# Patient Record
Sex: FEMALE | Race: BLACK OR AFRICAN AMERICAN | NOT HISPANIC OR LATINO | Employment: OTHER | URBAN - METROPOLITAN AREA
[De-identification: names, ages, dates, MRNs, and addresses within clinical notes are randomized per-mention and may not be internally consistent; named-entity substitution may affect disease eponyms.]

---

## 2017-01-11 ENCOUNTER — HOSPITAL ENCOUNTER (OUTPATIENT)
Dept: RADIOLOGY | Age: 82
Discharge: HOME | End: 2017-01-11
Payer: MEDICARE

## 2017-01-11 DIAGNOSIS — Z13.820 ENCOUNTER FOR SCREENING FOR OSTEOPOROSIS: ICD-10-CM

## 2017-01-11 PROCEDURE — 77080 DXA BONE DENSITY AXIAL: CPT

## 2017-01-27 ENCOUNTER — TRANSCRIBE ORDERS (OUTPATIENT)
Dept: ADMINISTRATIVE | Age: 82
End: 2017-01-27

## 2017-01-27 ENCOUNTER — LAB (OUTPATIENT)
Dept: LAB | Age: 82
End: 2017-01-27
Payer: MEDICARE

## 2017-01-27 DIAGNOSIS — R73.01 IMPAIRED FASTING GLUCOSE: ICD-10-CM

## 2017-01-27 DIAGNOSIS — E78.5 HYPERLIPIDEMIA: ICD-10-CM

## 2017-01-27 LAB
ALBUMIN SERPL BCP-MCNC: 3.9 G/DL (ref 3.5–5)
ALP SERPL-CCNC: 88 U/L (ref 46–116)
ALT SERPL W P-5'-P-CCNC: 26 U/L (ref 12–78)
ANION GAP SERPL CALCULATED.3IONS-SCNC: 6 MMOL/L (ref 4–13)
AST SERPL W P-5'-P-CCNC: 17 U/L (ref 5–45)
BASOPHILS # BLD AUTO: 0.02 THOUSANDS/ΜL (ref 0–0.1)
BASOPHILS NFR BLD AUTO: 0 % (ref 0–1)
BILIRUB SERPL-MCNC: 0.44 MG/DL (ref 0.2–1)
BUN SERPL-MCNC: 17 MG/DL (ref 5–25)
CALCIUM SERPL-MCNC: 9.1 MG/DL (ref 8.3–10.1)
CHLORIDE SERPL-SCNC: 105 MMOL/L (ref 100–108)
CHOLEST SERPL-MCNC: 225 MG/DL (ref 50–200)
CO2 SERPL-SCNC: 30 MMOL/L (ref 21–32)
CREAT SERPL-MCNC: 0.77 MG/DL (ref 0.6–1.3)
EOSINOPHIL # BLD AUTO: 0.13 THOUSAND/ΜL (ref 0–0.61)
EOSINOPHIL NFR BLD AUTO: 3 % (ref 0–6)
ERYTHROCYTE [DISTWIDTH] IN BLOOD BY AUTOMATED COUNT: 14.1 % (ref 11.6–15.1)
EST. AVERAGE GLUCOSE BLD GHB EST-MCNC: 128 MG/DL
GFR SERPL CREATININE-BSD FRML MDRD: >60 ML/MIN/1.73SQ M
GLUCOSE SERPL-MCNC: 98 MG/DL (ref 65–140)
HBA1C MFR BLD: 6.1 % (ref 4.2–6.3)
HCT VFR BLD AUTO: 40.1 % (ref 34.8–46.1)
HDLC SERPL-MCNC: 135 MG/DL (ref 40–60)
HGB BLD-MCNC: 12.9 G/DL (ref 11.5–15.4)
LDLC SERPL CALC-MCNC: 72 MG/DL (ref 0–100)
LYMPHOCYTES # BLD AUTO: 2.16 THOUSANDS/ΜL (ref 0.6–4.47)
LYMPHOCYTES NFR BLD AUTO: 43 % (ref 14–44)
MCH RBC QN AUTO: 31 PG (ref 26.8–34.3)
MCHC RBC AUTO-ENTMCNC: 32.2 G/DL (ref 31.4–37.4)
MCV RBC AUTO: 96 FL (ref 82–98)
MONOCYTES # BLD AUTO: 0.46 THOUSAND/ΜL (ref 0.17–1.22)
MONOCYTES NFR BLD AUTO: 9 % (ref 4–12)
NEUTROPHILS # BLD AUTO: 2.2 THOUSANDS/ΜL (ref 1.85–7.62)
NEUTS SEG NFR BLD AUTO: 45 % (ref 43–75)
NRBC BLD AUTO-RTO: 0 /100 WBCS
PLATELET # BLD AUTO: 228 THOUSANDS/UL (ref 149–390)
PMV BLD AUTO: 10.2 FL (ref 8.9–12.7)
POTASSIUM SERPL-SCNC: 4.1 MMOL/L (ref 3.5–5.3)
PROT SERPL-MCNC: 7.7 G/DL (ref 6.4–8.2)
RBC # BLD AUTO: 4.16 MILLION/UL (ref 3.81–5.12)
SODIUM SERPL-SCNC: 141 MMOL/L (ref 136–145)
TRIGL SERPL-MCNC: 92 MG/DL
WBC # BLD AUTO: 4.98 THOUSAND/UL (ref 4.31–10.16)

## 2017-01-27 PROCEDURE — 80061 LIPID PANEL: CPT

## 2017-01-27 PROCEDURE — 85025 COMPLETE CBC W/AUTO DIFF WBC: CPT

## 2017-01-27 PROCEDURE — 83036 HEMOGLOBIN GLYCOSYLATED A1C: CPT

## 2017-01-27 PROCEDURE — 36415 COLL VENOUS BLD VENIPUNCTURE: CPT

## 2017-01-27 PROCEDURE — 80053 COMPREHEN METABOLIC PANEL: CPT

## 2017-02-28 ENCOUNTER — ALLSCRIPTS OFFICE VISIT (OUTPATIENT)
Dept: OTHER | Facility: OTHER | Age: 82
End: 2017-02-28

## 2017-02-28 DIAGNOSIS — E78.5 HYPERLIPIDEMIA: ICD-10-CM

## 2017-02-28 DIAGNOSIS — R73.01 IMPAIRED FASTING GLUCOSE: ICD-10-CM

## 2017-02-28 DIAGNOSIS — I35.0 NONRHEUMATIC AORTIC VALVE STENOSIS: ICD-10-CM

## 2017-04-05 ENCOUNTER — GENERIC CONVERSION - ENCOUNTER (OUTPATIENT)
Dept: OTHER | Facility: OTHER | Age: 82
End: 2017-04-05

## 2017-04-05 ENCOUNTER — HOSPITAL ENCOUNTER (OUTPATIENT)
Dept: NON INVASIVE DIAGNOSTICS | Facility: CLINIC | Age: 82
Discharge: HOME | End: 2017-04-05
Payer: MEDICARE

## 2017-04-05 DIAGNOSIS — I35.0 NONRHEUMATIC AORTIC VALVE STENOSIS: ICD-10-CM

## 2017-04-05 PROCEDURE — 93306 TTE W/DOPPLER COMPLETE: CPT

## 2017-08-23 ENCOUNTER — TRANSCRIBE ORDERS (OUTPATIENT)
Dept: ADMINISTRATIVE | Age: 82
End: 2017-08-23

## 2017-08-23 ENCOUNTER — APPOINTMENT (OUTPATIENT)
Dept: LAB | Age: 82
End: 2017-08-23
Payer: MEDICARE

## 2017-08-23 DIAGNOSIS — E78.5 HYPERLIPIDEMIA: ICD-10-CM

## 2017-08-23 DIAGNOSIS — R73.01 IMPAIRED FASTING GLUCOSE: ICD-10-CM

## 2017-08-23 DIAGNOSIS — Z12.31 VISIT FOR SCREENING MAMMOGRAM: Primary | ICD-10-CM

## 2017-08-23 LAB
ALBUMIN SERPL BCP-MCNC: 3.8 G/DL (ref 3.5–5)
ALP SERPL-CCNC: 75 U/L (ref 46–116)
ALT SERPL W P-5'-P-CCNC: 21 U/L (ref 12–78)
ANION GAP SERPL CALCULATED.3IONS-SCNC: 4 MMOL/L (ref 4–13)
AST SERPL W P-5'-P-CCNC: 19 U/L (ref 5–45)
BASOPHILS # BLD AUTO: 0.02 THOUSANDS/ΜL (ref 0–0.1)
BASOPHILS NFR BLD AUTO: 0 % (ref 0–1)
BILIRUB SERPL-MCNC: 0.46 MG/DL (ref 0.2–1)
BUN SERPL-MCNC: 13 MG/DL (ref 5–25)
CALCIUM SERPL-MCNC: 9.2 MG/DL (ref 8.3–10.1)
CHLORIDE SERPL-SCNC: 104 MMOL/L (ref 100–108)
CHOLEST SERPL-MCNC: 204 MG/DL (ref 50–200)
CO2 SERPL-SCNC: 31 MMOL/L (ref 21–32)
CREAT SERPL-MCNC: 0.75 MG/DL (ref 0.6–1.3)
EOSINOPHIL # BLD AUTO: 0.14 THOUSAND/ΜL (ref 0–0.61)
EOSINOPHIL NFR BLD AUTO: 3 % (ref 0–6)
ERYTHROCYTE [DISTWIDTH] IN BLOOD BY AUTOMATED COUNT: 14.7 % (ref 11.6–15.1)
EST. AVERAGE GLUCOSE BLD GHB EST-MCNC: 126 MG/DL
GFR SERPL CREATININE-BSD FRML MDRD: 86 ML/MIN/1.73SQ M
GLUCOSE P FAST SERPL-MCNC: 98 MG/DL (ref 65–99)
HBA1C MFR BLD: 6 % (ref 4.2–6.3)
HCT VFR BLD AUTO: 39.4 % (ref 34.8–46.1)
HDLC SERPL-MCNC: 115 MG/DL (ref 40–60)
HGB BLD-MCNC: 12.9 G/DL (ref 11.5–15.4)
LDLC SERPL CALC-MCNC: 71 MG/DL (ref 0–100)
LYMPHOCYTES # BLD AUTO: 2.08 THOUSANDS/ΜL (ref 0.6–4.47)
LYMPHOCYTES NFR BLD AUTO: 41 % (ref 14–44)
MCH RBC QN AUTO: 30.8 PG (ref 26.8–34.3)
MCHC RBC AUTO-ENTMCNC: 32.7 G/DL (ref 31.4–37.4)
MCV RBC AUTO: 94 FL (ref 82–98)
MONOCYTES # BLD AUTO: 0.45 THOUSAND/ΜL (ref 0.17–1.22)
MONOCYTES NFR BLD AUTO: 9 % (ref 4–12)
NEUTROPHILS # BLD AUTO: 2.33 THOUSANDS/ΜL (ref 1.85–7.62)
NEUTS SEG NFR BLD AUTO: 47 % (ref 43–75)
NRBC BLD AUTO-RTO: 0 /100 WBCS
PLATELET # BLD AUTO: 264 THOUSANDS/UL (ref 149–390)
PMV BLD AUTO: 11.1 FL (ref 8.9–12.7)
POTASSIUM SERPL-SCNC: 4.6 MMOL/L (ref 3.5–5.3)
PROT SERPL-MCNC: 7.6 G/DL (ref 6.4–8.2)
RBC # BLD AUTO: 4.19 MILLION/UL (ref 3.81–5.12)
SODIUM SERPL-SCNC: 139 MMOL/L (ref 136–145)
TRIGL SERPL-MCNC: 88 MG/DL
WBC # BLD AUTO: 5.03 THOUSAND/UL (ref 4.31–10.16)

## 2017-08-23 PROCEDURE — 85025 COMPLETE CBC W/AUTO DIFF WBC: CPT

## 2017-08-23 PROCEDURE — 83036 HEMOGLOBIN GLYCOSYLATED A1C: CPT

## 2017-08-23 PROCEDURE — 36415 COLL VENOUS BLD VENIPUNCTURE: CPT

## 2017-08-23 PROCEDURE — 80061 LIPID PANEL: CPT

## 2017-08-23 PROCEDURE — 80053 COMPREHEN METABOLIC PANEL: CPT

## 2017-08-29 ENCOUNTER — ALLSCRIPTS OFFICE VISIT (OUTPATIENT)
Dept: OTHER | Facility: OTHER | Age: 82
End: 2017-08-29

## 2017-08-29 DIAGNOSIS — E78.5 HYPERLIPIDEMIA: ICD-10-CM

## 2017-08-29 DIAGNOSIS — R73.01 IMPAIRED FASTING GLUCOSE: ICD-10-CM

## 2017-09-20 ENCOUNTER — GENERIC CONVERSION - ENCOUNTER (OUTPATIENT)
Dept: OTHER | Facility: OTHER | Age: 82
End: 2017-09-20

## 2017-10-23 ENCOUNTER — GENERIC CONVERSION - ENCOUNTER (OUTPATIENT)
Dept: OTHER | Facility: OTHER | Age: 82
End: 2017-10-23

## 2018-01-09 NOTE — RESULT NOTES
Message   echo OK     Verified Results  ECHO COMPLETE WITH CONTRAST IF INDICATED [id number] 08:44AM Pia Mavis Order Number: [id number]    - Patient Instructions: To schedule this appointment, please contact Central Scheduling at [phone]  Test Name Result Flag Reference   ECHO COMPLETE WITH CONTRAST IF INDICATED (Report)     Patricia [address], [address] [facility]   [phone]     Transthoracic Echocardiogram   2D, M-mode, Doppler, and Color Doppler     Study date: 2017     Patient: True Mosley   MR number: [id number]   Account number: [de-identified]   : 1935   Age: 80 years   Gender: Female   Status: Outpatient   Location: [facility]   Height: 63 in   Weight: 176 7 lb   BP: 138/ 70 mmHg     Indications: Nonrheumatic aortic stenosis     Diagnoses: I35 0 - Nonrheumatic aortic (valve) stenosis     Sonographer: EDIS Price, RDCS   Referring Physician: Regla Sunshine MD   Group: [facility]   Interpreting Physician: Marlena Wasserman DO     SUMMARY     LEFT VENTRICLE:   Systolic function was normal  Ejection fraction was estimated to be 60 %  There were no regional wall motion abnormalities  Wall thickness was moderately increased  Concentric hypertrophy was present  Doppler parameters were consistent with abnormal left ventricular relaxation (grade 1 diastolic dysfunction)  RIGHT VENTRICLE:   The size was normal    Systolic function was normal      LEFT ATRIUM:   The atrium was mildly dilated  MITRAL VALVE:   There was moderate annular calcification  There was mild regurgitation  TRICUSPID VALVE:   There was mild regurgitation  Pulmonary artery systolic pressure was at the upper limits of normal      PULMONIC VALVE:   There was mild regurgitation  HISTORY: PRIOR HISTORY: Hyperlipidemia; GERD;  Aortic stenosis     PROCEDURE: The study was performed in the [facility]  This was a routine study  The transthoracic approach was used  The study included complete 2D imaging, M-mode, complete spectral Doppler, and color Doppler  The   heart rate was 63 bpm, at the start of the study  Images were obtained from the parasternal, apical, subcostal, and suprasternal notch acoustic windows  Image quality was adequate  LEFT VENTRICLE: Size was normal  Systolic function was normal  Ejection fraction was estimated to be 60 %  There were no regional wall motion abnormalities  Wall thickness was moderately increased  Concentric hypertrophy was present  DOPPLER: Doppler parameters were consistent with abnormal left ventricular relaxation (grade 1 diastolic dysfunction)  There was no evidence of elevated ventricular filling pressure by Doppler parameters  RIGHT VENTRICLE: The size was normal  Systolic function was normal  Wall thickness was normal      LEFT ATRIUM: The atrium was mildly dilated  RIGHT ATRIUM: Size was normal      MITRAL VALVE: There was moderate annular calcification  There was normal leaflet separation  DOPPLER: The transmitral velocity was within the normal range  There was no evidence for stenosis  There was mild regurgitation  AORTIC VALVE: The valve was trileaflet  Leaflets exhibited normal cuspal separation and sclerosis  DOPPLER: Transaortic velocity was within the normal range  There was no evidence for stenosis  There was no regurgitation  TRICUSPID VALVE: The valve structure was normal  There was normal leaflet separation  DOPPLER: The transtricuspid velocity was within the normal range  There was no evidence for stenosis  There was mild regurgitation  Pulmonary artery   systolic pressure was at the upper limits of normal      PULMONIC VALVE: Leaflets exhibited normal thickness, no calcification, and normal cuspal separation  DOPPLER: The transpulmonic velocity was within the normal range  There was mild regurgitation  PERICARDIUM: There was no pericardial effusion  The pericardium was normal in appearance  AORTA: The root exhibited normal size  SYSTEMIC VEINS: IVC: The inferior vena cava was normal in size and course   Respirophasic changes were normal      SYSTEM MEASUREMENT TABLES     2D   %FS: 46 75 %   AV Diam: 2 72 cm   EDV(Teich): 69 41 ml   EF(Cube): 84 9 %   EF(Teich): 78 68 %   ESV(Cube): 9 56 ml   ESV(Teich): 14 8 ml   IVSd: 1 36 cm   LA Area: 20 8 cm2   LA Diam: 4 06 cm   LVEDV MOD A4C: 61 4 ml   LVEF MOD A4C: 68 1 %   LVESV MOD A4C: 19 59 ml   LVIDd: 3 99 cm   LVIDs: 2 12 cm   LVLd A4C: 6 37 cm   LVLs A4C: 4 89 cm   LVPWd: 1 28 cm   RA Area: 8 63 cm2   RV Diam: 2 89 cm   SI(Cube): 29 22 ml/m2   SI(Teich): 29 68 ml/m2   SV MOD A4C: 41 81 ml   SV(Cube): 53 76 ml   SV(Teich): 54 61 ml     CW   TR MaxP 21 mmHg   TR Vmax: 2 88 m/s     MM   TAPSE: 2 23 cm     PW   E': 0 07 m/s   E/E': 13 61   MV A Jose: 1 11 m/s   MV [place]: 5 81 m/s2   MV DecT: 163 49 ms   MV E Jose: 0 95 m/s   MV E/A Ratio: 0 86     Inters\[facility] [place] [facility]\"" Commission Accredited Echocardiography Laboratory     Prepared and electronically signed by     Sukhdev Solis DO   Signed 2017 12:26:51

## 2018-01-10 NOTE — RESULT NOTES
Verified Results  (1) CBC/PLT/DIFF [id number] 08:54AM Larry Love     Test Name Result Flag Reference   WBC COUNT 5 13 Thousand/uL  4 31-10 16   RBC COUNT 4 02 Million/uL  3 81-5 12   HEMOGLOBIN 12 4 g/dL  11 5-15 4   HEMATOCRIT 38 5 %  34 8-46  1   MCV 96 fL  82-98   MCH 30 8 pg  26 8-34 3   MCHC 32 2 g/dL  31 4-37 4   RDW 14 1 %  11 6-15 1   MPV 11 0 fL  8 9-12 7   PLATELET COUNT 319 Thousands/uL  149-390   nRBC AUTOMATED 0 /100 WBCs     NEUTROPHILS RELATIVE PERCENT 39 % L 43-75   LYMPHOCYTES RELATIVE PERCENT 48 % H 14-44   MONOCYTES RELATIVE PERCENT 10 %  4-12   EOSINOPHILS RELATIVE PERCENT 3 %  0-6   BASOPHILS RELATIVE PERCENT 0 %  0-1   NEUTROPHILS ABSOLUTE COUNT 1 99 Thousands/µL  1 85-7 62   LYMPHOCYTES ABSOLUTE COUNT 2 45 Thousands/µL  0 60-4 47   MONOCYTES ABSOLUTE COUNT 0 53 Thousand/µL  0 17-1 22   EOSINOPHILS ABSOLUTE COUNT 0 13 Thousand/µL  0 00-0 61   BASOPHILS ABSOLUTE COUNT 0 02 Thousands/µL  0 00-0 10     (1) COMPREHENSIVE METABOLIC PANEL [id number] 20:95VH Larry Love   National Kidney Disease Education Program recommendations are as follows:  GFR calculation is accurate only with a steady state creatinine  Chronic Kidney disease less than 60 ml/min/1 73 sq  meters  Kidney failure less than 15 ml/min/1 73 sq  meters  Test Name Result Flag Reference   GLUCOSE,RANDM 91 mg/dL     If the patient is fasting, the ADA then defines impaired fasting glucose as > 100 mg/dL and diabetes as > or equal to 123 mg/dL     SODIUM 144 mmol/L  136-145   POTASSIUM 4 2 mmol/L  3 5-5 3   CHLORIDE 107 mmol/L  100-108   CARBON DIOXIDE 31 mmol/L  21-32   ANION GAP (CALC) 6 mmol/L  4-13   BLOOD UREA NITROGEN 14 mg/dL  5-25   CREATININE 0 79 mg/dL  0 60-1 30   Standardized to IDMS reference method   CALCIUM 8 5 mg/dL  8 3-10 1   BILI, TOTAL 0 45 mg/dL  0 20-1 00   ALK PHOSPHATAS 79 U/L     ALT (SGPT) 22 U/L  12-78   AST(SGOT) 18 U/L  5-45   ALBUMIN 3 7 g/dL  3 5-5 0   TOTAL PROTEIN 7 0 g/dL  6 4-8 2   eGFR Non-African American      >60 0 ml/min/1 73sq m     (1) LIPID PANEL, FASTING [id number] 08:54AM Marcio Guerra   Triglyceride:         Normal              <150 mg/dl       Borderline High    150-199 mg/dl       High               200-499 mg/dl       Very High          >499 mg/dl  Cholesterol:         Desirable        <200 mg/dl      Borderline High  200-239 mg/dl      High             >239 mg/dl  HDL Cholesterol:        High    >59 mg/dL      Low     <41 mg/dL  LDL CALCULATED:    This screening LDL is a calculated result  It does not have the accuracy of the Direct Measured LDL in the monitoring of patients with hyperlipidemia and/or statin therapy  Direct Measure LDL (TUN643) must be ordered separately in these patients       Test Name Result Flag Reference   CHOLESTEROL 172 mg/dL     HDL,DIRECT 96 mg/dL H 40-60   LDL CHOLESTEROL CALCULATED 60 mg/dL  0-100   TRIGLYCERIDES 80 mg/dL  <=150

## 2018-01-12 VITALS
HEIGHT: 63 IN | SYSTOLIC BLOOD PRESSURE: 124 MMHG | HEART RATE: 78 BPM | OXYGEN SATURATION: 96 % | BODY MASS INDEX: 29.97 KG/M2 | DIASTOLIC BLOOD PRESSURE: 70 MMHG | WEIGHT: 169.13 LBS

## 2018-01-12 NOTE — RESULT NOTES
Verified Results  (1) CBC/PLT/DIFF [id number] 09:59AM Nadira Matthew Order Number: [id number]     Test Name Result Flag Reference   WBC COUNT 5 15 Thousand/uL  4 31-10 16   RBC COUNT 4 00 Million/uL  3 81-5 12   HEMOGLOBIN 12 4 g/dL  11 5-15 4   HEMATOCRIT 38 2 %  34 8-46  1   MCV 96 fL  82-98   MCH 31 0 pg  26 8-34 3   MCHC 32 5 g/dL  31 4-37 4   RDW 14 0 %  11 6-15 1   MPV 10 7 fL  8 9-12 7   PLATELET COUNT 863 Thousands/uL  149-390   nRBC AUTOMATED 0 /100 WBCs     NEUTROPHILS RELATIVE PERCENT 50 %  43-75   LYMPHOCYTES RELATIVE PERCENT 38 %  14-44   MONOCYTES RELATIVE PERCENT 8 %  4-12   EOSINOPHILS RELATIVE PERCENT 3 %  0-6   BASOPHILS RELATIVE PERCENT 1 %  0-1   NEUTROPHILS ABSOLUTE COUNT 2 62 Thousands/?L  1 85-7 62   LYMPHOCYTES ABSOLUTE COUNT 1 93 Thousands/?L  0 60-4 47   MONOCYTES ABSOLUTE COUNT 0 42 Thousand/?L  0 17-1 22   EOSINOPHILS ABSOLUTE COUNT 0 14 Thousand/?L  0 00-0 61   BASOPHILS ABSOLUTE COUNT 0 03 Thousands/?L  0 00-0 10     (1) COMPREHENSIVE METABOLIC PANEL [id number] 24:59UY Gee [facility] Order Number: [id number]     Test Name Result Flag Reference   GLUCOSE,RANDM 98 mg/dL     If the patient is fasting, the ADA then defines impaired fasting glucose as > 100 mg/dL and diabetes as > or equal to 123 mg/dL     SODIUM 138 mmol/L  136-145   POTASSIUM 4 7 mmol/L  3 5-5 3   CHLORIDE 103 mmol/L  100-108   CARBON DIOXIDE 30 mmol/L  21-32   ANION GAP (CALC) 5 mmol/L  4-13   BLOOD UREA NITROGEN 16 mg/dL  5-25   CREATININE 0 77 mg/dL  0 60-1 30   Standardized to IDMS reference method   CALCIUM 9 2 mg/dL  8 3-10 1   BILI, TOTAL 0 47 mg/dL  0 20-1 00   ALK PHOSPHATAS 83 U/L     ALT (SGPT) 23 U/L  12-78   AST(SGOT) 14 U/L  5-45   ALBUMIN 3 8 g/dL  3 5-5 0   TOTAL PROTEIN 7 4 g/dL  6 4-8 2   eGFR Non-African American      >60 0 ml/min/1 73sq m   [employer] Disease Education Program recommendations are as follows:  GFR calculation is accurate only with a steady state creatinine  Chronic Kidney disease less than 60 ml/min/1 73 sq  meters  Kidney failure less than 15 ml/min/1 73 sq  meters  (1) LIPID PANEL, FASTING 02Sep2016 09:59AM Megan Lion Order Number: [id number]     Test Name Result Flag Reference   CHOLESTEROL 206 mg/dL H    HDL,DIRECT 116 mg/dL H 40-60   Specimen collection should occur prior to Metamizole administration due to the potential for falsely depressed results  LDL CHOLESTEROL CALCULATED 72 mg/dL  0-100   Triglyceride:         Normal              <150 mg/dl       Borderline High    150-199 mg/dl       High               200-499 mg/dl       Very High          >499 mg/dl  Cholesterol:         Desirable        <200 mg/dl      Borderline High  200-239 mg/dl      High             >239 mg/dl  HDL Cholesterol:        High    >59 mg/dL      Low     <41 mg/dL  LDL CALCULATED:    This screening LDL is a calculated result  It does not have the accuracy of the Direct Measured LDL in the monitoring of patients with hyperlipidemia and/or statin therapy  Direct Measure LDL (YEK460) must be ordered separately in these patients  TRIGLYCERIDES 92 mg/dL  <=150   Specimen collection should occur prior to N-Acetylcysteine or Metamizole administration due to the potential for falsely depressed results  (1) HEMOGLOBIN A1C 02Sep2016 09:59AM Megan Lion Order Number: [id number]     Test Name Result Flag Reference   HEMOGLOBIN A1C 6 5 % H 4 2-6 3   EST  AVG   GLUCOSE 140 mg/dl

## 2018-01-13 NOTE — PROGRESS NOTES
Assessment    1  Encounter for preventive health examination (V70 0) (Z00 00)    Plan  Health Maintenance    · There are many exercise options for seniors ; Status:Complete;   Done: [id number]  Visit for screening mammogram    · * MAMMO SCREENING BILATERAL W CAD; Status:Hold For - Scheduling; Requested  for:62Htf8483;     Discussion/Summary  Impression: Subsequent Annual Wellness Visit  Cardiovascular screening and counseling: screening is current  Diabetes screening and counseling: screening is current  Colorectal cancer screening and counseling: screening is current  Breast cancer screening and counseling: screening is current  Glaucoma screening and counseling: screening is current  Immunizations: influenza vaccination is recommended annually and the lifetime pneumococcal vaccine has been completed  Advance Directive Planning: complete and up to date, paperwork and instructions were given to the patient, She does have a living will but was interested in updating it  I gave her the 5 wishes form today  Patient Discussion: plan discussed with the patient, follow-up visit needed in one year  Self Referrals: No      Chief Complaint  Medicare wellness      Advance Directives  Advance Directive [facility]:   YES - Patient has an advance health care directive  The patient has a living will located  in patient's home  History of Present Illness  Welcome to Medicare and Wellness Visits: The patient is being seen for the subsequent annual wellness visit and Her questionnaire was reviewed with her today and a copy is in her chart  Co-Managers and Medical Equipment/Suppliers: See Patient Care Team   Reviewed Updated [facility]:   Last Medicare Wellness Visit Information was reviewed, patient interviewed, no change since last AWV  Review of Systems    Cardiovascular: no chest pain  Respiratory: no shortness of breath  Gastrointestinal: no abdominal pain  Active Problems     1   Aortic stenosis, mild (424 1) (I35 0)   2  Encounter for vitamin deficiency screening (V77 99) (Z13 21)   3  Glaucoma (365 9) (H40 9)   4  Psoriasis (696 1) (L40 9)   5  Screening for colon cancer (V76 51) (Z12 11)    Hyperlipidemia (272 4) (E78 5)       GERD without esophagitis (530 81) (K21 9)       Impaired fasting glucose (790 21) (R73 01)          Past Medical History    · Denied: History of mental disorder   · Wellness examination (V70 0) (Z00 00)    The active problems and past medical history were reviewed and updated today  Family History  Mother    · Family history of    · Denied: Family history of mental disorder   · Denied: Family history of substance abuse  Father    · Family history of    · Family history of hypertension (V17 49) (Z82 49)   · Denied: Family history of mental disorder   · Denied: Family history of substance abuse  Family History    · Denied: Family history of mental disorder   · Denied: Family history of substance abuse    Social History    · Denied: History of High risk sexual behavior   · Denied: History of Illicit drug use   · Never a smoker   · Retired   · Social alcohol use (Z78 9)  The social history was reviewed and updated today  Current Meds   1  Crestor 10 MG Oral Tablet; TAKE 1 TABLET DAILY; Therapy: 39Wmp9731 to (Evaluate:50Sld6118)  Requested for: 10Otb2211; Last   Rx:44Imw4062 Ordered   2  Omeprazole 20 MG Oral Capsule Delayed Release; take 1 capsule daily; Therapy: 49Tod0720 to (Evaluate:08Xiq9860)  Requested for: 24Ngd4115; Last   Rx:30Eta2423 Ordered   3  Travatan SOLN;   Therapy: (Recorded:14Cpc1469) to Recorded    The medication list was reviewed and updated today  Allergies    1  No Known Drug Allergies    Immunizations   1    PCV  -Aug-2015     Physical Exam    Constitutional   General appearance: No acute distress, well appearing and well nourished      Psychiatric   Judgment and insight: Normal     Orientation to person, place, and time: Normal     Recent and remote memory: Intact      Mood and affect: Normal        Signatures   Electronically signed by : Jerlene Bernheim, MD; Sep 15 2016  4:00PM EST                       (Author)

## 2018-01-14 VITALS
BODY MASS INDEX: 31.45 KG/M2 | OXYGEN SATURATION: 97 % | SYSTOLIC BLOOD PRESSURE: 138 MMHG | HEART RATE: 74 BPM | WEIGHT: 177.5 LBS | DIASTOLIC BLOOD PRESSURE: 70 MMHG | HEIGHT: 63 IN

## 2018-03-19 ENCOUNTER — TELEPHONE (OUTPATIENT)
Dept: INTERNAL MEDICINE CLINIC | Facility: CLINIC | Age: 83
End: 2018-03-19

## 2018-03-19 NOTE — TELEPHONE ENCOUNTER
Patient called to make a follow up appt  She gets her prolia shot the same time  She made the appt for April 11th will she be able to get the shot then?  With authorization

## 2018-04-04 ENCOUNTER — LAB (OUTPATIENT)
Dept: LAB | Age: 83
End: 2018-04-04
Payer: MEDICARE

## 2018-04-04 DIAGNOSIS — R73.01 IMPAIRED FASTING GLUCOSE: ICD-10-CM

## 2018-04-04 DIAGNOSIS — E78.5 HYPERLIPIDEMIA: ICD-10-CM

## 2018-04-04 LAB
ALBUMIN SERPL BCP-MCNC: 3.9 G/DL (ref 3.5–5)
ALP SERPL-CCNC: 66 U/L (ref 46–116)
ALT SERPL W P-5'-P-CCNC: 21 U/L (ref 12–78)
ANION GAP SERPL CALCULATED.3IONS-SCNC: 3 MMOL/L (ref 4–13)
AST SERPL W P-5'-P-CCNC: 24 U/L (ref 5–45)
BASOPHILS # BLD AUTO: 0.01 THOUSANDS/ΜL (ref 0–0.1)
BASOPHILS NFR BLD AUTO: 0 % (ref 0–1)
BILIRUB SERPL-MCNC: 0.42 MG/DL (ref 0.2–1)
BUN SERPL-MCNC: 21 MG/DL (ref 5–25)
CALCIUM SERPL-MCNC: 8.5 MG/DL (ref 8.3–10.1)
CHLORIDE SERPL-SCNC: 106 MMOL/L (ref 100–108)
CHOLEST SERPL-MCNC: 222 MG/DL (ref 50–200)
CO2 SERPL-SCNC: 30 MMOL/L (ref 21–32)
CREAT SERPL-MCNC: 0.86 MG/DL (ref 0.6–1.3)
EOSINOPHIL # BLD AUTO: 0.14 THOUSAND/ΜL (ref 0–0.61)
EOSINOPHIL NFR BLD AUTO: 3 % (ref 0–6)
ERYTHROCYTE [DISTWIDTH] IN BLOOD BY AUTOMATED COUNT: 13.9 % (ref 11.6–15.1)
EST. AVERAGE GLUCOSE BLD GHB EST-MCNC: 134 MG/DL
GFR SERPL CREATININE-BSD FRML MDRD: 73 ML/MIN/1.73SQ M
GLUCOSE P FAST SERPL-MCNC: 94 MG/DL (ref 65–99)
HBA1C MFR BLD: 6.3 % (ref 4.2–6.3)
HCT VFR BLD AUTO: 39.5 % (ref 34.8–46.1)
HDLC SERPL-MCNC: 114 MG/DL (ref 40–60)
HGB BLD-MCNC: 12.9 G/DL (ref 11.5–15.4)
LDLC SERPL CALC-MCNC: 89 MG/DL (ref 0–100)
LYMPHOCYTES # BLD AUTO: 2.05 THOUSANDS/ΜL (ref 0.6–4.47)
LYMPHOCYTES NFR BLD AUTO: 46 % (ref 14–44)
MCH RBC QN AUTO: 31.3 PG (ref 26.8–34.3)
MCHC RBC AUTO-ENTMCNC: 32.7 G/DL (ref 31.4–37.4)
MCV RBC AUTO: 96 FL (ref 82–98)
MONOCYTES # BLD AUTO: 0.36 THOUSAND/ΜL (ref 0.17–1.22)
MONOCYTES NFR BLD AUTO: 8 % (ref 4–12)
NEUTROPHILS # BLD AUTO: 1.97 THOUSANDS/ΜL (ref 1.85–7.62)
NEUTS SEG NFR BLD AUTO: 43 % (ref 43–75)
NRBC BLD AUTO-RTO: 0 /100 WBCS
PLATELET # BLD AUTO: 246 THOUSANDS/UL (ref 149–390)
PMV BLD AUTO: 10.8 FL (ref 8.9–12.7)
POTASSIUM SERPL-SCNC: 4 MMOL/L (ref 3.5–5.3)
PROT SERPL-MCNC: 7.7 G/DL (ref 6.4–8.2)
RBC # BLD AUTO: 4.12 MILLION/UL (ref 3.81–5.12)
SODIUM SERPL-SCNC: 139 MMOL/L (ref 136–145)
TRIGL SERPL-MCNC: 94 MG/DL
WBC # BLD AUTO: 4.54 THOUSAND/UL (ref 4.31–10.16)

## 2018-04-04 PROCEDURE — 80053 COMPREHEN METABOLIC PANEL: CPT

## 2018-04-04 PROCEDURE — 83036 HEMOGLOBIN GLYCOSYLATED A1C: CPT

## 2018-04-04 PROCEDURE — 80061 LIPID PANEL: CPT

## 2018-04-04 PROCEDURE — 36415 COLL VENOUS BLD VENIPUNCTURE: CPT

## 2018-04-04 PROCEDURE — 85025 COMPLETE CBC W/AUTO DIFF WBC: CPT

## 2018-04-11 ENCOUNTER — OFFICE VISIT (OUTPATIENT)
Dept: INTERNAL MEDICINE CLINIC | Facility: CLINIC | Age: 83
End: 2018-04-11
Payer: MEDICARE

## 2018-04-11 VITALS
HEIGHT: 63 IN | OXYGEN SATURATION: 97 % | HEART RATE: 70 BPM | TEMPERATURE: 98.6 F | SYSTOLIC BLOOD PRESSURE: 142 MMHG | BODY MASS INDEX: 31.43 KG/M2 | RESPIRATION RATE: 16 BRPM | WEIGHT: 177.4 LBS | DIASTOLIC BLOOD PRESSURE: 86 MMHG

## 2018-04-11 DIAGNOSIS — K21.9 GERD WITHOUT ESOPHAGITIS: ICD-10-CM

## 2018-04-11 DIAGNOSIS — H40.10X0 OPEN-ANGLE GLAUCOMA, UNSPECIFIED GLAUCOMA STAGE, UNSPECIFIED LATERALITY, UNSPECIFIED OPEN-ANGLE GLAUCOMA TYPE: ICD-10-CM

## 2018-04-11 DIAGNOSIS — M81.0 AGE-RELATED OSTEOPOROSIS WITHOUT CURRENT PATHOLOGICAL FRACTURE: ICD-10-CM

## 2018-04-11 DIAGNOSIS — E78.2 MIXED HYPERLIPIDEMIA: Primary | ICD-10-CM

## 2018-04-11 DIAGNOSIS — R73.01 IMPAIRED FASTING GLUCOSE: ICD-10-CM

## 2018-04-11 PROBLEM — M15.9 DJD (DEGENERATIVE JOINT DISEASE), MULTIPLE SITES: Status: ACTIVE | Noted: 2017-08-29

## 2018-04-11 PROCEDURE — 99214 OFFICE O/P EST MOD 30 MIN: CPT | Performed by: INTERNAL MEDICINE

## 2018-04-11 RX ORDER — ROSUVASTATIN CALCIUM 10 MG/1
1 TABLET, COATED ORAL DAILY
COMMUNITY
Start: 2015-09-11 | End: 2018-10-16 | Stop reason: SDUPTHER

## 2018-04-11 RX ORDER — OMEPRAZOLE 20 MG/1
20 CAPSULE, DELAYED RELEASE ORAL DAILY
Qty: 90 CAPSULE | Refills: 1 | Status: SHIPPED | OUTPATIENT
Start: 2018-04-11 | End: 2018-10-16 | Stop reason: SDUPTHER

## 2018-04-11 RX ORDER — TRAVOPROST 0.004 %
DROPS OPHTHALMIC (EYE)
COMMUNITY
Start: 2018-03-30

## 2018-04-11 RX ORDER — CALCIPOTRIENE 50 UG/G
AEROSOL, FOAM TOPICAL
COMMUNITY
Start: 2018-02-15 | End: 2021-01-21 | Stop reason: ALTCHOICE

## 2018-04-11 RX ORDER — OMEPRAZOLE 20 MG/1
CAPSULE, DELAYED RELEASE ORAL
COMMUNITY
Start: 2018-02-25 | End: 2018-04-11 | Stop reason: SDUPTHER

## 2018-04-11 RX ORDER — CELECOXIB 100 MG/1
1 CAPSULE ORAL 2 TIMES DAILY
COMMUNITY
Start: 2017-08-29 | End: 2019-11-05 | Stop reason: SDUPTHER

## 2018-04-11 NOTE — PROGRESS NOTES
Assessment/Plan:     Chronic problems are stable  Continue present medications  Continue diet and exercise  Ordered labs for next visit  Followup scheduled per orders  No problem-specific Assessment & Plan notes found for this encounter  Diagnoses and all orders for this visit:    Mixed hyperlipidemia  -     CBC and differential; Future  -     Comprehensive metabolic panel; Future  -     Lipid panel; Future    GERD without esophagitis  -     omeprazole (PriLOSEC) 20 mg delayed release capsule; Take 1 capsule (20 mg total) by mouth daily    Impaired fasting glucose  -     HEMOGLOBIN A1C W/ EAG ESTIMATION; Future    Age-related osteoporosis without current pathological fracture  -     denosumab (PROLIA) subcutaneous injection 60 mg; Inject 1 mL (60 mg total) under the skin once     Open-angle glaucoma, unspecified glaucoma stage, unspecified laterality, unspecified open-angle glaucoma type    Other orders  -     SORILUX 0 005 % topical foam;   -     celecoxib (CeleBREX) 100 mg capsule; Take 1 capsule by mouth 2 (two) times a day  -     VOLTAREN 1 %;   -     Discontinue: omeprazole (PriLOSEC) 20 mg delayed release capsule;   -     rosuvastatin (CRESTOR) 10 MG tablet; Take 1 tablet by mouth daily  -     TRAVATAN Z 0 004 % ophthalmic solution;   -     ranibizumab (LUCENTIS) 0 5 mg/0 05mL; 0 5 mg once          Subjective:      Patient ID: Luz Pang is a 80 y o  female  Patient comes in today for routine follow-up  She states she is doing well and has no new complaints  Her blood work shows continued control of her sugar and her cholesterol  She is still only taking a half tablet of the medication without any noted side effects  The higher dose had been giving her trouble  Her reflux has not been bothering her as long as she takes her medicine  She reports that her main issue would be her thighs  The eye doctors are changing her drops          ALLERGIES:  No Known Allergies    CURRENT MEDICATIONS:    Current Outpatient Prescriptions:     celecoxib (CeleBREX) 100 mg capsule, Take 1 capsule by mouth 2 (two) times a day, Disp: , Rfl:     omeprazole (PriLOSEC) 20 mg delayed release capsule, Take 1 capsule (20 mg total) by mouth daily, Disp: 90 capsule, Rfl: 1    ranibizumab (LUCENTIS) 0 5 mg/0 05mL, 0 5 mg once, Disp: , Rfl:     rosuvastatin (CRESTOR) 10 MG tablet, Take 1 tablet by mouth daily, Disp: , Rfl:     SORILUX 0 005 % topical foam, , Disp: , Rfl:     TRAVATAN Z 0 004 % ophthalmic solution, , Disp: , Rfl:     VOLTAREN 1 %, , Disp: , Rfl:     Current Facility-Administered Medications:     denosumab (PROLIA) subcutaneous injection 60 mg, 60 mg, Subcutaneous, Once, Dave Quiñones MD    ACTIVE PROBLEM LIST:  Patient Active Problem List   Diagnosis    Aortic stenosis, mild    DJD (degenerative joint disease), multiple sites    GERD without esophagitis    Glaucoma    Hyperlipidemia    Impaired fasting glucose    Osteoporosis    Psoriasis       PAST MEDICAL HISTORY:  No past medical history on file  PAST SURGICAL HISTORY:  No past surgical history on file  FAMILY HISTORY:  Family History   Problem Relation Age of Onset    Hypertension Father        SOCIAL HISTORY:  Social History     Social History    Marital status:      Spouse name: N/A    Number of children: N/A    Years of education: N/A     Occupational History    Not on file  Social History Main Topics    Smoking status: Former Smoker     Packs/day: 0 50     Types: Cigarettes    Smokeless tobacco: Former User      Comment: Patient reports she smoked for 20 years    Alcohol use Yes    Drug use: No    Sexual activity: No     Other Topics Concern    Not on file     Social History Narrative    No narrative on file       Review of Systems   Respiratory: Negative for shortness of breath  Cardiovascular: Negative for chest pain  Gastrointestinal: Negative for abdominal pain           Objective:  Vitals: 04/11/18 1141   BP: 142/86   BP Location: Right arm   Patient Position: Sitting   Cuff Size: Adult   Pulse: 70   Resp: 16   Temp: 98 6 °F (37 °C)   SpO2: 97%   Weight: 80 5 kg (177 lb 6 4 oz)   Height: 5' 3" (1 6 m)        Physical Exam   Constitutional: She is oriented to person, place, and time  She appears well-developed and well-nourished  Cardiovascular: Normal rate, regular rhythm and normal heart sounds  Pulmonary/Chest: Effort normal and breath sounds normal    Abdominal: Soft  There is no tenderness  Neurological: She is alert and oriented to person, place, and time  Nursing note and vitals reviewed          RESULTS:    Recent Results (from the past 1008 hour(s))   CBC and differential    Collection Time: 04/04/18  9:56 AM   Result Value Ref Range    WBC 4 54 4 31 - 10 16 Thousand/uL    RBC 4 12 3 81 - 5 12 Million/uL    Hemoglobin 12 9 11 5 - 15 4 g/dL    Hematocrit 39 5 34 8 - 46 1 %    MCV 96 82 - 98 fL    MCH 31 3 26 8 - 34 3 pg    MCHC 32 7 31 4 - 37 4 g/dL    RDW 13 9 11 6 - 15 1 %    MPV 10 8 8 9 - 12 7 fL    Platelets 175 562 - 025 Thousands/uL    nRBC 0 /100 WBCs    Neutrophils Relative 43 43 - 75 %    Lymphocytes Relative 46 (H) 14 - 44 %    Monocytes Relative 8 4 - 12 %    Eosinophils Relative 3 0 - 6 %    Basophils Relative 0 0 - 1 %    Neutrophils Absolute 1 97 1 85 - 7 62 Thousands/µL    Lymphocytes Absolute 2 05 0 60 - 4 47 Thousands/µL    Monocytes Absolute 0 36 0 17 - 1 22 Thousand/µL    Eosinophils Absolute 0 14 0 00 - 0 61 Thousand/µL    Basophils Absolute 0 01 0 00 - 0 10 Thousands/µL   Comprehensive metabolic panel    Collection Time: 04/04/18  9:56 AM   Result Value Ref Range    Sodium 139 136 - 145 mmol/L    Potassium 4 0 3 5 - 5 3 mmol/L    Chloride 106 100 - 108 mmol/L    CO2 30 21 - 32 mmol/L    Anion Gap 3 (L) 4 - 13 mmol/L    BUN 21 5 - 25 mg/dL    Creatinine 0 86 0 60 - 1 30 mg/dL    Glucose, Fasting 94 65 - 99 mg/dL    Calcium 8 5 8 3 - 10 1 mg/dL    AST 24 5 - 45 U/L    ALT 21 12 - 78 U/L    Alkaline Phosphatase 66 46 - 116 U/L    Total Protein 7 7 6 4 - 8 2 g/dL    Albumin 3 9 3 5 - 5 0 g/dL    Total Bilirubin 0 42 0 20 - 1 00 mg/dL    eGFR 73 ml/min/1 73sq m   Lipid panel    Collection Time: 04/04/18  9:56 AM   Result Value Ref Range    Cholesterol 222 (H) 50 - 200 mg/dL    Triglycerides 94 <=150 mg/dL    HDL, Direct 114 (H) 40 - 60 mg/dL    LDL Calculated 89 0 - 100 mg/dL   Hemoglobin A1c    Collection Time: 04/04/18  9:56 AM   Result Value Ref Range    Hemoglobin A1C 6 3 4 2 - 6 3 %     mg/dl

## 2018-07-16 ENCOUNTER — OFFICE VISIT (OUTPATIENT)
Dept: INTERNAL MEDICINE CLINIC | Facility: CLINIC | Age: 83
End: 2018-07-16
Payer: MEDICARE

## 2018-07-16 VITALS
HEIGHT: 63 IN | DIASTOLIC BLOOD PRESSURE: 80 MMHG | HEART RATE: 62 BPM | RESPIRATION RATE: 20 BRPM | BODY MASS INDEX: 30.83 KG/M2 | TEMPERATURE: 98.4 F | OXYGEN SATURATION: 97 % | WEIGHT: 174 LBS | SYSTOLIC BLOOD PRESSURE: 130 MMHG

## 2018-07-16 DIAGNOSIS — R07.2 PRECORDIAL PAIN: Primary | ICD-10-CM

## 2018-07-16 PROCEDURE — 99213 OFFICE O/P EST LOW 20 MIN: CPT | Performed by: PHYSICIAN ASSISTANT

## 2018-07-16 RX ORDER — HALCINONIDE CREAM 1 MG/G
CREAM TOPICAL
COMMUNITY
Start: 2018-06-14

## 2018-07-16 NOTE — PROGRESS NOTES
Assessment/Plan:   Patient Instructions   Will schedule patient for a nuclear stress test   Will discuss results when they are available  EKG interpretation viewed from the machine (unable to print), no acute changes noted  Poor R-wave progression in the anterior leads  Return in about 3 months (around 10/16/2018) for Next scheduled follow up-Dr Rohit Carr  Diagnoses and all orders for this visit:    Precordial pain  -     NM myocardial perfusion spect (rx stress and/or rest); Future  -     POCT ECG    Other orders  -     HALOG 0 1 % CREA; Subjective:      Patient ID: Altagracia Haile is a 80 y o  female  Acute visit    Patient reports she had been on vacation and had been lugging her luggage around and doing a lot a walking  She noted on 2 occasions while walking excessive amount of time she developed a burning in her anterior chest   She states it lasted for a few minutes and then spontaneously subsided  She remark she felt a little short of breath with each episode  This occurred on 2 occasions but was not associated with diaphoresis, nausea, lightheadedness or palpitations  She states she took Tums because it was the only thing she had with her but was not convinced that it was helpful  She has a history of a hiatal hernia for which she is on omeprazole and this has not bothered her in many years  She denies any black stools  No fever or chills, no cough  Patient did remark that prior to going on vacation she had been walking on her treadmill for 30 min at a time without any difficulty  Echocardiogram done approximately 1 year ago showed mild mitral, tricuspid, and pulmonic regurgitation  EF was interpreted as 60%          ALLERGIES:  No Known Allergies    CURRENT MEDICATIONS:    Current Outpatient Prescriptions:     celecoxib (CeleBREX) 100 mg capsule, Take 1 capsule by mouth 2 (two) times a day, Disp: , Rfl:     HALOG 0 1 % CREA, , Disp: , Rfl:     omeprazole (PriLOSEC) 20 mg delayed release capsule, Take 1 capsule (20 mg total) by mouth daily, Disp: 90 capsule, Rfl: 1    rosuvastatin (CRESTOR) 10 MG tablet, Take 1 tablet by mouth daily, Disp: , Rfl:     SORILUX 0 005 % topical foam, , Disp: , Rfl:     TRAVATAN Z 0 004 % ophthalmic solution, , Disp: , Rfl:     ranibizumab (LUCENTIS) 0 5 mg/0 05mL, 0 5 mg once, Disp: , Rfl:     VOLTAREN 1 %, , Disp: , Rfl:     ACTIVE PROBLEM LIST:  Patient Active Problem List   Diagnosis    Aortic stenosis, mild    DJD (degenerative joint disease), multiple sites    GERD without esophagitis    Glaucoma    Hyperlipidemia    Impaired fasting glucose    Osteoporosis    Psoriasis    Precordial pain       PAST MEDICAL HISTORY:  No past medical history on file  PAST SURGICAL HISTORY:  No past surgical history on file  FAMILY HISTORY:  Family History   Problem Relation Age of Onset    Hypertension Father        SOCIAL HISTORY:  Social History     Social History    Marital status:      Spouse name: N/A    Number of children: N/A    Years of education: N/A     Occupational History    Not on file  Social History Main Topics    Smoking status: Former Smoker     Packs/day: 0 50     Types: Cigarettes    Smokeless tobacco: Former User      Comment: Patient reports she smoked for 20 years    Alcohol use Yes    Drug use: No    Sexual activity: No     Other Topics Concern    Not on file     Social History Narrative    No narrative on file       Review of Systems   Constitutional: Negative for activity change, chills, fatigue and fever  HENT: Negative for congestion  Eyes: Negative for discharge  Respiratory: Positive for shortness of breath  Negative for cough and chest tightness  Cardiovascular: Positive for chest pain  Negative for palpitations and leg swelling  Gastrointestinal: Negative for abdominal pain  Genitourinary: Negative for difficulty urinating     Musculoskeletal: Negative for arthralgias and myalgias  Skin: Negative for rash  Allergic/Immunologic: Negative for immunocompromised state  Neurological: Negative for dizziness, syncope, weakness, light-headedness and headaches  Hematological: Negative for adenopathy  Does not bruise/bleed easily  Psychiatric/Behavioral: Negative for dysphoric mood  The patient is not nervous/anxious  Objective:  Vitals:    07/16/18 1536 07/16/18 1639   BP: 156/88 130/80   BP Location: Left arm Left arm   Patient Position: Sitting Sitting   Cuff Size: Adult    Pulse: 62    Resp: 20    Temp: 98 4 °F (36 9 °C)    TempSrc: Temporal    SpO2: 97%    Weight: 78 9 kg (174 lb)    Height: 5' 3" (1 6 m)         Physical Exam   Constitutional: She is oriented to person, place, and time  She appears well-developed and well-nourished  No distress  Appears younger than stated age  HENT:   Head: Normocephalic  Nose: Nose normal    Mouth/Throat: Oropharynx is clear and moist    Eyes: Conjunctivae and EOM are normal  Pupils are equal, round, and reactive to light  Neck: Neck supple  Carotid bruit is not present  No thyromegaly present  Cardiovascular: Normal rate and regular rhythm  Murmur (Grade 2/6 systolic murmur best audible at the apex radiating to the aortic area and also left upper chest) heard  Pulmonary/Chest: Effort normal and breath sounds normal  She has no wheezes  Abdominal: Soft  Bowel sounds are normal  There is no tenderness  Musculoskeletal: She exhibits no edema  Lymphadenopathy:     She has no cervical adenopathy  Neurological: She is alert and oriented to person, place, and time  Skin: Skin is warm and dry  No rash noted  Psychiatric: She has a normal mood and affect  Her behavior is normal    Nursing note and vitals reviewed  RESULTS:    No results found for this or any previous visit (from the past 1008 hour(s))  This note was created with voice recognition software    Phonic, grammatical and spelling errors may be present within the note as a result

## 2018-07-24 ENCOUNTER — HOSPITAL ENCOUNTER (OUTPATIENT)
Dept: NON INVASIVE DIAGNOSTICS | Facility: CLINIC | Age: 83
Discharge: HOME | End: 2018-07-24
Payer: MEDICARE

## 2018-07-24 DIAGNOSIS — R07.2 PRECORDIAL PAIN: ICD-10-CM

## 2018-07-24 LAB
CHEST PAIN STATEMENT: NORMAL
MAX DIASTOLIC BP: 80 MMHG
MAX HEART RATE: 86 BPM
MAX PREDICTED HEART RATE: 138 BPM
MAX. SYSTOLIC BP: 146 MMHG
PROTOCOL NAME: NORMAL
REASON FOR TERMINATION: NORMAL
TARGET HR FORMULA: NORMAL
TEST INDICATION: NORMAL
TIME IN EXERCISE PHASE: NORMAL

## 2018-07-24 PROCEDURE — A9502 TC99M TETROFOSMIN: HCPCS

## 2018-07-24 PROCEDURE — 78452 HT MUSCLE IMAGE SPECT MULT: CPT

## 2018-07-24 PROCEDURE — 93017 CV STRESS TEST TRACING ONLY: CPT

## 2018-07-24 RX ADMIN — REGADENOSON 0.4 MG: 0.08 INJECTION, SOLUTION INTRAVENOUS at 09:20

## 2018-08-31 ENCOUNTER — TRANSCRIBE ORDERS (OUTPATIENT)
Dept: ADMINISTRATIVE | Age: 83
End: 2018-08-31

## 2018-08-31 ENCOUNTER — APPOINTMENT (OUTPATIENT)
Dept: LAB | Age: 83
End: 2018-08-31
Payer: MEDICARE

## 2018-08-31 DIAGNOSIS — E78.2 MIXED HYPERLIPIDEMIA: ICD-10-CM

## 2018-08-31 DIAGNOSIS — R73.01 IMPAIRED FASTING GLUCOSE: ICD-10-CM

## 2018-08-31 LAB
ALBUMIN SERPL BCP-MCNC: 3.7 G/DL (ref 3.5–5)
ALP SERPL-CCNC: 68 U/L (ref 46–116)
ALT SERPL W P-5'-P-CCNC: 22 U/L (ref 12–78)
ANION GAP SERPL CALCULATED.3IONS-SCNC: 7 MMOL/L (ref 4–13)
AST SERPL W P-5'-P-CCNC: 20 U/L (ref 5–45)
BASOPHILS # BLD AUTO: 0.03 THOUSANDS/ΜL (ref 0–0.1)
BASOPHILS NFR BLD AUTO: 1 % (ref 0–1)
BILIRUB SERPL-MCNC: 0.46 MG/DL (ref 0.2–1)
BUN SERPL-MCNC: 12 MG/DL (ref 5–25)
CALCIUM SERPL-MCNC: 8.7 MG/DL (ref 8.3–10.1)
CHLORIDE SERPL-SCNC: 104 MMOL/L (ref 100–108)
CHOLEST SERPL-MCNC: 205 MG/DL (ref 50–200)
CO2 SERPL-SCNC: 29 MMOL/L (ref 21–32)
CREAT SERPL-MCNC: 0.71 MG/DL (ref 0.6–1.3)
EOSINOPHIL # BLD AUTO: 0.15 THOUSAND/ΜL (ref 0–0.61)
EOSINOPHIL NFR BLD AUTO: 3 % (ref 0–6)
ERYTHROCYTE [DISTWIDTH] IN BLOOD BY AUTOMATED COUNT: 13.9 % (ref 11.6–15.1)
EST. AVERAGE GLUCOSE BLD GHB EST-MCNC: 134 MG/DL
GFR SERPL CREATININE-BSD FRML MDRD: 92 ML/MIN/1.73SQ M
GLUCOSE P FAST SERPL-MCNC: 97 MG/DL (ref 65–99)
HBA1C MFR BLD: 6.3 % (ref 4.2–6.3)
HCT VFR BLD AUTO: 41.5 % (ref 34.8–46.1)
HDLC SERPL-MCNC: 108 MG/DL (ref 40–60)
HGB BLD-MCNC: 12.7 G/DL (ref 11.5–15.4)
IMM GRANULOCYTES # BLD AUTO: 0.02 THOUSAND/UL (ref 0–0.2)
IMM GRANULOCYTES NFR BLD AUTO: 0 % (ref 0–2)
LDLC SERPL CALC-MCNC: 80 MG/DL (ref 0–100)
LYMPHOCYTES # BLD AUTO: 1.85 THOUSANDS/ΜL (ref 0.6–4.47)
LYMPHOCYTES NFR BLD AUTO: 38 % (ref 14–44)
MCH RBC QN AUTO: 30.8 PG (ref 26.8–34.3)
MCHC RBC AUTO-ENTMCNC: 30.6 G/DL (ref 31.4–37.4)
MCV RBC AUTO: 101 FL (ref 82–98)
MONOCYTES # BLD AUTO: 0.49 THOUSAND/ΜL (ref 0.17–1.22)
MONOCYTES NFR BLD AUTO: 10 % (ref 4–12)
NEUTROPHILS # BLD AUTO: 2.31 THOUSANDS/ΜL (ref 1.85–7.62)
NEUTS SEG NFR BLD AUTO: 48 % (ref 43–75)
NONHDLC SERPL-MCNC: 97 MG/DL
NRBC BLD AUTO-RTO: 0 /100 WBCS
PLATELET # BLD AUTO: 211 THOUSANDS/UL (ref 149–390)
PMV BLD AUTO: 10.4 FL (ref 8.9–12.7)
POTASSIUM SERPL-SCNC: 3.7 MMOL/L (ref 3.5–5.3)
PROT SERPL-MCNC: 7.6 G/DL (ref 6.4–8.2)
RBC # BLD AUTO: 4.13 MILLION/UL (ref 3.81–5.12)
SODIUM SERPL-SCNC: 140 MMOL/L (ref 136–145)
TRIGL SERPL-MCNC: 83 MG/DL
WBC # BLD AUTO: 4.85 THOUSAND/UL (ref 4.31–10.16)

## 2018-08-31 PROCEDURE — 83036 HEMOGLOBIN GLYCOSYLATED A1C: CPT

## 2018-08-31 PROCEDURE — 80053 COMPREHEN METABOLIC PANEL: CPT

## 2018-08-31 PROCEDURE — 80061 LIPID PANEL: CPT

## 2018-08-31 PROCEDURE — 36415 COLL VENOUS BLD VENIPUNCTURE: CPT

## 2018-08-31 PROCEDURE — 85025 COMPLETE CBC W/AUTO DIFF WBC: CPT

## 2018-09-11 ENCOUNTER — TELEPHONE (OUTPATIENT)
Dept: INTERNAL MEDICINE CLINIC | Facility: CLINIC | Age: 83
End: 2018-09-11

## 2018-09-11 NOTE — TELEPHONE ENCOUNTER
Gabrielle Banks called in asking if she can get her Prolia shot the same day as her next appointment 10/16/18 so she doesn't have to come in twice    She does not want a call back to let her know because she is coming in anyway

## 2018-10-16 ENCOUNTER — OFFICE VISIT (OUTPATIENT)
Dept: INTERNAL MEDICINE CLINIC | Facility: CLINIC | Age: 83
End: 2018-10-16
Payer: MEDICARE

## 2018-10-16 VITALS
HEIGHT: 63 IN | WEIGHT: 176.2 LBS | OXYGEN SATURATION: 94 % | DIASTOLIC BLOOD PRESSURE: 70 MMHG | BODY MASS INDEX: 31.22 KG/M2 | HEART RATE: 71 BPM | SYSTOLIC BLOOD PRESSURE: 120 MMHG

## 2018-10-16 DIAGNOSIS — K21.9 GERD WITHOUT ESOPHAGITIS: ICD-10-CM

## 2018-10-16 DIAGNOSIS — Z23 NEED FOR INFLUENZA VACCINATION: ICD-10-CM

## 2018-10-16 DIAGNOSIS — R73.01 IMPAIRED FASTING GLUCOSE: ICD-10-CM

## 2018-10-16 DIAGNOSIS — M15.9 PRIMARY OSTEOARTHRITIS INVOLVING MULTIPLE JOINTS: Primary | ICD-10-CM

## 2018-10-16 DIAGNOSIS — E78.2 MIXED HYPERLIPIDEMIA: ICD-10-CM

## 2018-10-16 PROCEDURE — 90662 IIV NO PRSV INCREASED AG IM: CPT | Performed by: INTERNAL MEDICINE

## 2018-10-16 PROCEDURE — 99214 OFFICE O/P EST MOD 30 MIN: CPT | Performed by: INTERNAL MEDICINE

## 2018-10-16 PROCEDURE — G0008 ADMIN INFLUENZA VIRUS VAC: HCPCS | Performed by: INTERNAL MEDICINE

## 2018-10-16 PROCEDURE — 96372 THER/PROPH/DIAG INJ SC/IM: CPT | Performed by: INTERNAL MEDICINE

## 2018-10-16 RX ORDER — ROSUVASTATIN CALCIUM 10 MG/1
10 TABLET, COATED ORAL DAILY
Qty: 90 TABLET | Refills: 0 | Status: SHIPPED | OUTPATIENT
Start: 2018-10-16 | End: 2019-04-15 | Stop reason: SDUPTHER

## 2018-10-16 RX ORDER — OMEPRAZOLE 20 MG/1
20 CAPSULE, DELAYED RELEASE ORAL DAILY
Qty: 90 CAPSULE | Refills: 0 | Status: SHIPPED | OUTPATIENT
Start: 2018-10-16 | End: 2019-04-15 | Stop reason: SDUPTHER

## 2018-10-16 NOTE — PROGRESS NOTES
Assessment/Plan:      Chronic problems are stable  Continue present medications  Continue diet and exercise  Ordered labs for next visit  Return in about 6 months (around 4/16/2019)  No problem-specific Assessment & Plan notes found for this encounter  Diagnoses and all orders for this visit:    Primary osteoarthritis involving multiple joints  -     denosumab (PROLIA) subcutaneous injection 60 mg; Inject 1 mL (60 mg total) under the skin once     GERD without esophagitis  -     omeprazole (PriLOSEC) 20 mg delayed release capsule; Take 1 capsule (20 mg total) by mouth daily    Impaired fasting glucose  -     Hemoglobin A1C; Future    Mixed hyperlipidemia  -     rosuvastatin (CRESTOR) 10 MG tablet; Take 1 tablet (10 mg total) by mouth daily  -     CBC and differential; Future  -     Comprehensive metabolic panel; Future  -     Lipid panel; Future    Need for influenza vaccination  -     influenza vaccine, 8718-4199, high-dose, PF 0 5 mL, for patients 65 yr+ (FLUZONE HIGH-DOSE)          Subjective:      Patient ID: Alexis Valdez is a 80 y o  female  Patient comes in today for routine follow-up  She states she is doing well with no new complaints today  Her blood pressure is controlled  Her sugar is controlled  Cholesterol is also controlled with the half tablet of Crestor  She is taking the medicines as directed  Still gets some muscle aches which she thinks her from the Crestor but tolerable  Still has some arthritis pain  Denies any further additions to her history          ALLERGIES:  No Known Allergies    CURRENT MEDICATIONS:    Current Outpatient Prescriptions:     celecoxib (CeleBREX) 100 mg capsule, Take 1 capsule by mouth 2 (two) times a day, Disp: , Rfl:     HALOG 0 1 % CREA, , Disp: , Rfl:     omeprazole (PriLOSEC) 20 mg delayed release capsule, Take 1 capsule (20 mg total) by mouth daily, Disp: 90 capsule, Rfl: 0    ranibizumab (LUCENTIS) 0 5 mg/0 05mL, 0 5 mg once, Disp: , Rfl:     rosuvastatin (CRESTOR) 10 MG tablet, Take 1 tablet (10 mg total) by mouth daily, Disp: 90 tablet, Rfl: 0    SORILUX 0 005 % topical foam, , Disp: , Rfl:     TRAVATAN Z 0 004 % ophthalmic solution, , Disp: , Rfl:     VOLTAREN 1 %, , Disp: , Rfl:   No current facility-administered medications for this visit  ACTIVE PROBLEM LIST:  Patient Active Problem List   Diagnosis    Aortic stenosis, mild    DJD (degenerative joint disease), multiple sites    GERD without esophagitis    Glaucoma    Mixed hyperlipidemia    Impaired fasting glucose    Osteoporosis    Psoriasis    Precordial pain       PAST MEDICAL HISTORY:  No past medical history on file  PAST SURGICAL HISTORY:  No past surgical history on file  FAMILY HISTORY:  Family History   Problem Relation Age of Onset    Hypertension Father        SOCIAL HISTORY:  Social History     Social History    Marital status:      Spouse name: N/A    Number of children: N/A    Years of education: N/A     Occupational History    Not on file  Social History Main Topics    Smoking status: Former Smoker     Packs/day: 0 50     Types: Cigarettes    Smokeless tobacco: Former User      Comment: Patient reports she smoked for 20 years    Alcohol use Yes    Drug use: No    Sexual activity: No     Other Topics Concern    Not on file     Social History Narrative    No narrative on file       Review of Systems   Respiratory: Negative for shortness of breath  Cardiovascular: Negative for chest pain  Gastrointestinal: Negative for abdominal pain  Objective:  Vitals:    10/16/18 1502   BP: 120/70   BP Location: Left arm   Patient Position: Sitting   Pulse: 71   SpO2: 94%   Weight: 79 9 kg (176 lb 3 2 oz)   Height: 5' 3" (1 6 m)        Physical Exam   Constitutional: She is oriented to person, place, and time  She appears well-developed and well-nourished  Cardiovascular: Normal rate, regular rhythm and normal heart sounds  Pulmonary/Chest: Effort normal and breath sounds normal    Abdominal: Soft  There is no tenderness  Neurological: She is alert and oriented to person, place, and time  Nursing note and vitals reviewed  RESULTS:    No results found for this or any previous visit (from the past 1008 hour(s))  This note was created with voice recognition software  Phonic, grammatical and spelling errors may be present within the note as a result

## 2019-01-22 ENCOUNTER — APPOINTMENT (OUTPATIENT)
Dept: RADIOLOGY | Age: 84
End: 2019-01-22
Attending: FAMILY MEDICINE
Payer: MEDICARE

## 2019-01-22 ENCOUNTER — OFFICE VISIT (OUTPATIENT)
Dept: URGENT CARE | Age: 84
End: 2019-01-22
Payer: MEDICARE

## 2019-01-22 VITALS
BODY MASS INDEX: 30.48 KG/M2 | TEMPERATURE: 98 F | SYSTOLIC BLOOD PRESSURE: 124 MMHG | DIASTOLIC BLOOD PRESSURE: 78 MMHG | WEIGHT: 172 LBS | RESPIRATION RATE: 18 BRPM | HEART RATE: 75 BPM | OXYGEN SATURATION: 97 % | HEIGHT: 63 IN
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
## 2019-01-22 DIAGNOSIS — S89.92XA LEFT KNEE INJURY, INITIAL ENCOUNTER: ICD-10-CM

## 2019-01-22 DIAGNOSIS — S69.91XA THUMB INJURY, RIGHT, INITIAL ENCOUNTER: ICD-10-CM
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
## 2019-01-22 PROCEDURE — 73140 X-RAY EXAM OF FINGER(S): CPT

## 2019-01-22 PROCEDURE — 99213 OFFICE O/P EST LOW 20 MIN: CPT | Performed by: FAMILY MEDICINE

## 2019-01-22 PROCEDURE — 73564 X-RAY EXAM KNEE 4 OR MORE: CPT

## 2019-01-22 PROCEDURE — G0463 HOSPITAL OUTPT CLINIC VISIT: HCPCS | Performed by: FAMILY MEDICINE

## 2019-01-22 NOTE — PATIENT INSTRUCTIONS
Rest, elevate injured areas, limit activity through next week  Ice to injured areas 2 to 3 times a day for 15-20 minutes  Be careful of swelling and the wearing of rings on the fingers of the right hand  Antibiotic ointment to abrasion (left knee) as needed  Pain medication as needed  Recheck/follow-up Orthopedics next week as scheduled  Oneida Mariluz   [phone]    Please go to the hospital emergency department if needed

## 2019-01-22 NOTE — PROGRESS NOTES
[facility]        NAME: Bruce Camarillo is a 80 y o  female  : 1935    MRN: [id number]  DATE: 2019  TIME: 3:32 PM    Assessment and Plan   Fall, initial encounter [W19  XXXA]  1  Fall, initial encounter  XR thumb right first digit-min 2v    XR knee 4+ vw left injury   2  Thumb injury, right, initial encounter     3  Left knee injury, initial encounter           Patient Instructions     Patient Instructions   Rest, elevate injured areas, limit activity through next week  Ice to injured areas 2 to 3 times a day for 15-20 minutes  Be careful of swelling and the wearing of rings on the fingers of the right hand  Antibiotic ointment to abrasion (left knee) as needed  Pain medication as needed  Recheck/follow-up Orthopedics next week as scheduled  Coit Joy   [phone]    Please go to the hospital emergency department if needed  Chief Complaint     Chief Complaint   Patient presents with    Fall     pt states she fell today on side walk step  C o right thumb and left knee pain            History of Present Illness       Patient states she fell earlier today injuring her right thumb/right hand, and left knee areas; superficial abrasion present over the anterior aspect of the left knee; patient is right-hand dominant; patient states she has an appointment with Orthopedics next week        Review of Systems   Review of Systems   Musculoskeletal:        Tenderness and bruising over the proximal aspect of the right thumb/dorsum of the right hand area; generalized discomfort and slight swelling over the anterior aspect of the left knee   Skin:        Superficial abrasion present over the anterior aspect of the left knee         Current Medications       Current Outpatient Prescriptions:     omeprazole (PriLOSEC) 20 mg delayed release capsule, Take 1 capsule (20 mg total) by mouth daily, Disp: 90 capsule, Rfl: 0    rosuvastatin (CRESTOR) 10 MG tablet, Take 1 tablet (10 mg total) by mouth daily, Disp: 90 tablet, Rfl: 0    SORILUX 0 005 % topical foam, , Disp: , Rfl:     TRAVATAN Z 0 004 % ophthalmic solution, , Disp: , Rfl:     celecoxib (CeleBREX) 100 mg capsule, Take 1 capsule by mouth 2 (two) times a day, Disp: , Rfl:     HALOG 0 1 % CREA, , Disp: , Rfl:     ranibizumab (LUCENTIS) 0 5 mg/0 05mL, 0 5 mg once, Disp: , Rfl:     VOLTAREN 1 %, , Disp: , Rfl:     Current Allergies     Allergies as of 01/22/2019    (No Known Allergies)            The following portions of the patient's history were reviewed and updated as appropriate: allergies, current medications, past family history, past medical history, past social history, past surgical history and problem list      Past Medical History:   Diagnosis Date    Hypertension     Osteoporosis        History reviewed  No pertinent surgical history  Family History   Problem Relation Age of Onset    Hypertension Father          Medications have been verified  Objective   /78 (BP Location: Right arm, Patient Position: Sitting)   Pulse 75   Temp 98 °F (36 7 °C) (Oral)   Resp 18   Ht 5' 3" (1 6 m)   Wt 78 kg (172 lb)   SpO2 97%   BMI 30 47 kg/m²        Physical Exam     Physical Exam   Constitutional: She is oriented to person, place, and time  She appears well-developed and well-nourished  HENT:   Mouth/Throat: Oropharynx is clear and moist    Musculoskeletal:   Tenderness and bruising over the proximal aspect of the right thumb/dorsum of the right hand area; generalized discomfort with slight swelling over the anterior aspect of the left knee; intact pulses, capillary refill, sensation, and motor exam right upper extremity and left lower extremity   Neurological: She is alert and oriented to person, place, and time  Skin:   Superficial abrasion present over the anterior aspect of the left knee   Psychiatric: She has a normal mood and affect  Her behavior is normal    Nursing note and vitals reviewed        Right thumb x-rays - no fracture noted    Left knee x-rays - no fracture noted

## 2019-02-01 ENCOUNTER — TRANSCRIBE ORDERS (OUTPATIENT)
Dept: PHYSICAL THERAPY | Age: 84
End: 2019-02-01

## 2019-02-01 ENCOUNTER — EVALUATION (OUTPATIENT)
Dept: PHYSICAL THERAPY | Age: 84
End: 2019-02-01
Payer: MEDICARE

## 2019-02-01 DIAGNOSIS — M17.31 POST-TRAUMATIC OSTEOARTHRITIS OF RIGHT KNEE: Primary | ICD-10-CM

## 2019-02-01 DIAGNOSIS — M17.32 POST-TRAUMATIC OSTEOARTHRITIS OF ONE KNEE, LEFT: ICD-10-CM

## 2019-02-01 PROCEDURE — G8979 MOBILITY GOAL STATUS: HCPCS | Performed by: PHYSICAL THERAPIST

## 2019-02-01 PROCEDURE — G8978 MOBILITY CURRENT STATUS: HCPCS | Performed by: PHYSICAL THERAPIST

## 2019-02-01 PROCEDURE — 97140 MANUAL THERAPY 1/> REGIONS: CPT | Performed by: PHYSICAL THERAPIST

## 2019-02-01 PROCEDURE — 97162 PT EVAL MOD COMPLEX 30 MIN: CPT | Performed by: PHYSICAL THERAPIST

## 2019-02-01 NOTE — PROGRESS NOTES
PT Evaluation     Today's date: 2019  Patient name: Sunita Sutton  : 1935  MRN: [id number]  Referring provider: Naila Cast DO  Dx:   Encounter Diagnosis     ICD-10-CM    1  Post-traumatic osteoarthritis of right knee M17 31    2  Post-traumatic osteoarthritis of one knee, left M17 32        Start Time: 1145  Stop Time: 1235  Total time in clinic (min): 50 minutes    Assessment  Assessment details: Patient noted she had a fall 1 5 weeks ago and fell on her left knee  Patient noted she was walking in the snow and did not notice a curb and fell onto her right side creating pain in left knee and thumb  Patient noted she had left knee pain with ambulation immediately after the fall, which she noted has gotten better but she noted stair descent remains pain aggravating  Patient noted she remembered only one other fall 10 years ago  Patient noted she had an x-ray which was - for bony pathology  Patient denies left le edema but noted left patellar region is   Patient denies left le paraesthesias  Patient noted she had left ITB region soreness and weakness 4 weeks ago but it did fully resolve  Patient noted she had left lateral thigh weakness at that has resolve  Patient noted she has a left knee open patellar support neoprene sleeve  Impairments: abnormal or restricted ROM, impaired physical strength and pain with function  Understanding of Dx/Px/POC: excellent   Prognosis: good  Prognosis details: Patient is a 80y o  year old female seen for outpatient PT evaluation with pain and mobility deficits due to left knee pain s/p fall  Patient presents to PT IE with the following problems, concerns, deficits and impairments: left knee pain, decreased left le range of motion, decreased left le strength, + TTP, stair dysfunctions, left knee special tests, functional limitations and decreased tolerance to activity    Patient would benefit from skilled PT services under the following PT treatment plan to address the above noted deficits: therapeutic exercises and activities to facilitate left le rom and strength, modalities, manual therapy techniques, balance and proprioception activities, gait and stair training, IASTM techniques, Kinesio taping techniques and a hep  Thank you for the referral      Goals  Short Term goals 2 - 4 weeks  1  Patient will be independent HEP  2   Patient will report a 25 - 50% decrease in pain complaints  3   Increase strength 1/2 grade  4   Increase ROM 5-10 degrees  Long Term goals 4 - 8 weeks  1  Patient will report elimination of pain complaints  2   Patient will return to all recreational activities without restriction  3   ROM WFL  4   Strength 5/5   5   Patient will report walking improved to normal distance and pace  6   Patient will report stair climbing returned to normal reciprocal pattern  7   Patient will report self foto computerized functional index improved to > = 69      Plan  Patient would benefit from: skilled physical therapy  Planned modality interventions: cryotherapy, TENS, thermotherapy: hydrocollator packs and unattended electrical stimulation  Planned therapy interventions: joint mobilization, manual therapy, massage, aquatic therapy, balance, balance/weight bearing training, neuromuscular re-education, ADL retraining, ADL training, activity modification, patient education, postural training, compression, self care, strengthening, stretching, therapeutic activities, therapeutic exercise, therapeutic training, transfer training, flexibility, functional ROM exercises, gait training, graded activity, graded exercise, graded motor and home exercise program  Frequency: 2x week  Duration in weeks: 8        Subjective Evaluation    History of Present Illness  Mechanism of injury: Patient's PMHx is remarkable for GERD without esophagitis, Aortic stenosis, mild, DJD (degenerative joint disease), multiple sites, Osteoporosis, Psoriasis, Glaucoma, and HTN  Pain  At best pain ratin  At worst pain ratin  Location: left knee pain    Patient Goals  Patient goals for therapy: decreased pain, increased motion and increased strength  Patient goal: Patient's goal:" to eliminate left knee soreness and to regain function"  Objective     Tenderness     Additional Tenderness Details  Patient is + TTP at minimal levels at lateral patellar region, lateral joint line and lateral femoral epicondyle region  Active Range of Motion   Left Hip   Flexion: 100 degrees   Abduction: 15 degrees     Right Hip   Flexion: 94 degrees   Abduction: 16 degrees   Left Knee   Flexion: 94 degrees   Extension: -5 degrees   Extensor la degrees     Right Knee   Flexion: 115 degrees   Extension: 2 degrees   Extensor la degrees   Left Ankle/Foot   Dorsiflexion (ke): 8 degrees   Plantar flexion: 38 degrees     Right Ankle/Foot   Dorsiflexion (ke): 10 degrees   Plantar flexion: 40 degrees     Additional Active Range of Motion Details  SLR flexion on right at 62 and left at 55 degrees  Strength/Myotome Testing     Left Hip   Planes of Motion   Flexion: 4+  Extension: 4+  Abduction: 4+  Adduction: 5    Right Hip   Planes of Motion   Flexion: 5  Extension: 5  Abduction: 5  Adduction: 5    Left Knee   Flexion: 4  Extension: 4-    Right Knee   Flexion: 5  Extension: 5    Left Ankle/Foot   Dorsiflexion: 4  Plantar flexion: 5    Right Ankle/Foot   Dorsiflexion: 5  Plantar flexion: 5    Tests     Left Knee   Positive patellar apprehension and patella-femoral grind  Negative lateral Suleiman, medial Suleiman, valgus stress test at 0 degrees, valgus stress test at 30 degrees, varus stress test at 0 degrees and varus stress test at 30 degrees  General Comments:      Knee Comments  Girth Measurements:  Knee:  Suprapatellar region: Right at 44 0  CM and left at 43 0 CM; Mid patellar region: Right at 41 5 CM and left at 41 0 Cm;   Infrapatellar region: Right at 38 6 CM and left at 38 5 Cm  Flowsheet Rows      Most Recent Value   PT/OT G-Codes   Current Score  60   Projected Score  69   FOTO information reviewed  Yes   Assessment Type  Evaluation   G code set  Mobility: Walking & Moving Around   Mobility: Walking and Moving Around Current Status ()  CK   Mobility: Walking and Moving Around Goal Status ()  CJ          Precautions: Patient's PMHx is remarkable for GERD without esophagitis, Aortic stenosis, mild, DJD (degenerative joint disease), multiple sites, Osteoporosis, Psoriasis, Glaucoma, and HTN  Daily Treatment Diary     Manual  2/1            Kinesio taping to left patellar region in a "U" pattern with base at patellar tendon and tails at medial and lateral patellar regions with 25 % tension upward on tails 10 min                                                                    Exercise Diary  2/1            treadmill             Knee flexion stretch off step:L             Gastrocnemius stretch off step:B:             LAQ:B:             Hip flexion:B:             Hamstring stretch:B:             slr flexion with quad set:L:             SAQ:B:             Bridges             Left Hip abduction in right side lying              Left hip adduction in left side lying             Prone TKE             Prone hip extension:L             Prone knee flexion self stretch:L             Prone knee flexion arom:L             Heel slides:L             slr x 3 in standing:B:             Hamstring curls:B:             Mini squats             Lunges:B:             Step ups:L 6"            Step downs:L 4"            Lateral step ups:L: 6"            SLS and Tandem stance                              Modalities  2/1            CP to left knee PRN

## 2019-02-01 NOTE — LETTER
2019    DO Maria Esther Alejandrapitamiko [address] [facility]    Patient: Mary Deras   YOB: 1935   Date of Visit: 2019     Encounter Diagnosis     ICD-10-CM    1  Post-traumatic osteoarthritis of right knee M17 31        Dear Dr Nancy Palacios:    Please review the attached Plan of Care from [place] recent visit  Please verify that you agree therapy should continue by signing the attached document and sending it back to our office  If you have any questions or concerns, please don't hesitate to call  Sincerely,    Chela Matthews, PT      Referring Provider:      I certify that I have read the below Plan of Care and certify the need for these services furnished under this plan of treatment while under my care  DO Sujata Alejandra [address] [place] [address]  VIA Facsimile: [phone]          PT Evaluation     Today's date: 2019  Patient name: Mary Deras  : 1935  MRN: [id number]  Referring provider: Ang Campos DO  Dx:   Encounter Diagnosis     ICD-10-CM    1  Post-traumatic osteoarthritis of right knee M17 31                   Assessment  Assessment details: Patient noted she had a fall 1 5 weeks ago and fell on her left knee  Patient noted she was walking in the snow and did not notice a curb and fell onto her right side creating pain in left knee and thumb  Patient noted she had left knee pain with ambulation immediately after the fall, which she noted has gotten better but she noted stair descent remains pain aggravating  Patient noted she remembered only one other fall 10 years ago  Patient noted she had an x-ray which was - for bony pathology  Patient denies left le edema but noted left patellar region is   Patient denies left le paraesthesias  Patient noted she had left ITB region soreness and weakness 4 weeks ago but it did fully resolve    Patient noted she had left lateral thigh weakness at that has resolve  Patient noted she has a left knee open patellar support neoprene sleeve  Impairments: abnormal or restricted ROM, impaired physical strength and pain with function  Understanding of Dx/Px/POC: excellent   Prognosis: good  Prognosis details: Patient is a 80y o  year old female seen for outpatient PT evaluation with pain and mobility deficits due to left knee pain s/p fall  Patient presents to PT IE with the following problems, concerns, deficits and impairments: left knee pain, decreased left le range of motion, decreased left le strength, + TTP, stair dysfunctions, left knee special tests, functional limitations and decreased tolerance to activity  Patient would benefit from skilled PT services under the following PT treatment plan to address the above noted deficits: therapeutic exercises and activities to facilitate left le rom and strength, modalities, manual therapy techniques, balance and proprioception activities, gait and stair training, IASTM techniques, Kinesio taping techniques and a hep  Thank you for the referral      Goals  Short Term goals 2 - 4 weeks  1  Patient will be independent HEP  2   Patient will report a 25 - 50% decrease in pain complaints  3   Increase strength 1/2 grade  4   Increase ROM 5-10 degrees  Long Term goals 4 - 8 weeks  1  Patient will report elimination of pain complaints  2   Patient will return to all recreational activities without restriction  3   ROM WFL  4   Strength 5/5   5   Patient will report walking improved to normal distance and pace  6   Patient will report stair climbing returned to normal reciprocal pattern  7   Patient will report self foto computerized functional index improved to > = 69      Plan  Patient would benefit from: skilled physical therapy  Planned modality interventions: cryotherapy, TENS, thermotherapy: hydrocollator packs and unattended electrical stimulation  Planned therapy interventions: joint mobilization, manual therapy, massage, aquatic therapy, balance, balance/weight bearing training, neuromuscular re-education, ADL retraining, ADL training, activity modification, patient education, postural training, compression, self care, strengthening, stretching, therapeutic activities, therapeutic exercise, therapeutic training, transfer training, flexibility, functional ROM exercises, gait training, graded activity, graded exercise, graded motor and home exercise program  Frequency: 2x week  Duration in weeks: 8        Subjective Evaluation    History of Present Illness  Mechanism of injury: Patient's PMHx is remarkable for GERD without esophagitis, Aortic stenosis, mild, DJD (degenerative joint disease), multiple sites, Osteoporosis, Psoriasis, Glaucoma, and HTN  Pain  At best pain ratin  At worst pain ratin  Location: left knee pain    Patient Goals  Patient goals for therapy: decreased pain, increased motion and increased strength  Patient goal: Patient's goal:" to eliminate left knee soreness and to regain function"  Objective     Tenderness     Additional Tenderness Details  Patient is + TTP at minimal levels at lateral patellar region, lateral joint line and lateral femoral epicondyle region  Active Range of Motion   Left Hip   Flexion: 100 degrees   Abduction: 15 degrees     Right Hip   Flexion: 94 degrees   Abduction: 16 degrees   Left Knee   Flexion: 94 degrees   Extension: -5 degrees   Extensor la degrees     Right Knee   Flexion: 115 degrees   Extension: 2 degrees   Extensor la degrees   Left Ankle/Foot   Dorsiflexion (ke): 8 degrees   Plantar flexion: 38 degrees     Right Ankle/Foot   Dorsiflexion (ke): 10 degrees   Plantar flexion: 40 degrees     Additional Active Range of Motion Details  SLR flexion on right at 62 and left at 55 degrees      Strength/Myotome Testing     Left Hip   Planes of Motion   Flexion: 4+  Extension: 4+  Abduction: 4+  Adduction: 5    Right Hip   Planes of Motion   Flexion: 5  Extension: 5  Abduction: 5  Adduction: 5    Left Knee   Flexion: 4  Extension: 4-    Right Knee   Flexion: 5  Extension: 5    Left Ankle/Foot   Dorsiflexion: 4  Plantar flexion: 5    Right Ankle/Foot   Dorsiflexion: 5  Plantar flexion: 5    Tests     Left Knee   Positive patellar apprehension and patella-femoral grind  Negative lateral Suleiman, medial Suleiman, valgus stress test at 0 degrees, valgus stress test at 30 degrees, varus stress test at 0 degrees and varus stress test at 30 degrees  General Comments:      Knee Comments  Girth Measurements:  Knee:  Suprapatellar region: Right at 44 0  CM and left at 43 0 CM; Mid patellar region: Right at 41 5 CM and left at 41 0 Cm; Infrapatellar region: Right at 38 6 CM and left at 38 5 Cm  Flowsheet Rows      Most Recent Value   PT/OT G-Codes   Current Score  60   Projected Score  69   FOTO information reviewed  Yes   Assessment Type  Evaluation   G code set  Mobility: Walking & Moving Around   Mobility: Walking and Moving Around Current Status ()  CK   Mobility: Walking and Moving Around Goal Status ()  CJ          Precautions: Patient's PMHx is remarkable for GERD without esophagitis, Aortic stenosis, mild, DJD (degenerative joint disease), multiple sites, Osteoporosis, Psoriasis, Glaucoma, and HTN      Daily Treatment Diary     Manual  2/1            Kinesio taping to left patellar region in a "U" pattern with base at patellar tendon and tails at medial and lateral patellar regions with 25 % tension upward on tails 10 min                                                                    Exercise Diary  2/1            treadmill             Knee flexion stretch off step:L             Gastrocnemius stretch off step:B:             LAQ:B:             Hip flexion:B:             Hamstring stretch:B:             slr flexion with quad set:L:             SAQ:B:             Bridges             Left Hip abduction in right side lying              Left hip adduction in left side lying             Prone TKE             Prone hip extension:L             Prone knee flexion self stretch:L             Prone knee flexion arom:L             Heel slides:L             slr x 3 in standing:B:             Hamstring curls:B:             Mini squats             Lunges:B:             Step ups:L 6"            Step downs:L 4"            Lateral step ups:L: 6"            SLS and Tandem stance                              Modalities  2/1            CP to left knee PRN

## 2019-02-06 ENCOUNTER — OFFICE VISIT (OUTPATIENT)
Dept: PHYSICAL THERAPY | Age: 84
End: 2019-02-06
Payer: MEDICARE

## 2019-02-06 DIAGNOSIS — M17.32 POST-TRAUMATIC OSTEOARTHRITIS OF LEFT KNEE: ICD-10-CM

## 2019-02-06 DIAGNOSIS — M17.31 POST-TRAUMATIC OSTEOARTHRITIS OF RIGHT KNEE: Primary | ICD-10-CM

## 2019-02-06 PROCEDURE — 97110 THERAPEUTIC EXERCISES: CPT

## 2019-02-06 PROCEDURE — 97112 NEUROMUSCULAR REEDUCATION: CPT

## 2019-02-06 NOTE — PROGRESS NOTES
Daily Note     Today's date: 2019  Patient name: Ángel Talamantes  : 1935  MRN: [id number]  Referring provider: Pardeep Brink DO  Dx:   Encounter Diagnosis     ICD-10-CM    1  Post-traumatic osteoarthritis of right knee M17 31                   Subjective: "My L knee is feeling better"       Objective: See treatment diary below      Assessment: Decreased L knee pain after today's session  Plan: Cont with Plan of care         Precautions: Patient's PMHx is remarkable for GERD without esophagitis, Aortic stenosis, mild, DJD (degenerative joint disease), multiple sites, Osteoporosis, Psoriasis, Glaucoma, and HTN  Daily Treatment Diary     Manual              Kinesio taping to left patellar region in a "U" pattern with base at patellar tendon and tails at medial and lateral patellar regions with 25 % tension upward on tails 10 min                                                                    Exercise Diary             treadmill  10 min 2 8 mph            Knee flexion stretch off step:L  20sec 5x            Gastrocnemius stretch off step:B:  20sec 5x            LAQ:B:  20x            Hip flexion:B:  NT            Hamstring stretch:B:  20sec 5x            slr flexion with quad set:L:  20x            SAQ:B:  20x 3sec            Bridges  20x 3sec            Left Hip abduction in right side lying   NT            Left hip adduction in left side lying  NT            Prone TKE  NT            Prone hip extension:L  NT            Prone knee flexion self stretch:L  NT            Prone knee flexion arom:L  NT            Heel slides:L  NT            slr x 3 in standing:B:  NT            Hamstring curls:B:  NT            Mini squats  20x            Lunges:B:  NT            Step ups:L 6" NT            Step downs:L 4" NT            Lateral step ups:L: 6" NT            SLS and Tandem stance  NT                             Modalities             CP to left knee PRN    10 min

## 2019-02-08 ENCOUNTER — OFFICE VISIT (OUTPATIENT)
Dept: PHYSICAL THERAPY | Age: 84
End: 2019-02-08
Payer: MEDICARE

## 2019-02-08 DIAGNOSIS — M17.32 POST-TRAUMATIC OSTEOARTHRITIS OF LEFT KNEE: ICD-10-CM

## 2019-02-08 DIAGNOSIS — M17.31 POST-TRAUMATIC OSTEOARTHRITIS OF RIGHT KNEE: Primary | ICD-10-CM

## 2019-02-08 PROCEDURE — 97110 THERAPEUTIC EXERCISES: CPT

## 2019-02-08 PROCEDURE — 97112 NEUROMUSCULAR REEDUCATION: CPT

## 2019-02-08 NOTE — PROGRESS NOTES
Daily Note     Today's date: 2019  Patient name: Darryle Hinds  : 1935  MRN: [id number]  Referring provider: Asha Mendieta DO  Dx:   Encounter Diagnosis     ICD-10-CM    1  Post-traumatic osteoarthritis of right knee M17 31    2  Post-traumatic osteoarthritis of left knee M17 32                   Subjective: Pt noted decreased L knee pain "I still have trouble going down the steps"       Objective: See treatment diary below      Assessment: Pt presents with poor quad control at times with exercises  Plan: Cont with Plan of care         Precautions: Patient's PMHx is remarkable for GERD without esophagitis, Aortic stenosis, mild, DJD (degenerative joint disease), multiple sites, Osteoporosis, Psoriasis, Glaucoma, and HTN      Daily Treatment Diary     Manual             Kinesio taping to left patellar region in a "U" pattern with base at patellar tendon and tails at medial and lateral patellar regions with 25 % tension upward on tails 10 min MAURILIO                                                                   Exercise Diary            treadmill  10 min 2 8 mph  10 min           Knee flexion stretch off step:L  20sec 5x            Gastrocnemius stretch off step:B:  20sec 5x            LAQ:B:  20x  20x           Hip flexion:B:  NT  NT           Hamstring stretch:B:  20sec 5x  20sec 5x           slr flexion with quad set:L:  20x  20x           SAQ:B:  20x 3sec  20x 3sec           Bridges  20x 3sec  20x 3sec           Left Hip abduction in right side lying   NT  20x           Left hip adduction in left side lying  NT  NT           Prone TKE  NT  NT          Prone hip extension:L  NT  20x           Prone quad st   NT  20sec 5x           Prone knee flexion arom:L  NT  NT           Heel slides:L  NT  NT           slr x 3 in standing:B:  NT  20x           Hamstring curls:B:  NT  20x           Mini squats  20x  20x           Lunges:B:  NT  NT           Step ups:L 6" NT  20x Step downs:L 4" NT  20x           Lateral step ups:L: 6" NT  20x           SLS and Tandem stance  NT  NT                            Modalities  2/1 2/8           CP to left knee PRN    10 min

## 2019-02-13 ENCOUNTER — OFFICE VISIT (OUTPATIENT)
Dept: PHYSICAL THERAPY | Age: 84
End: 2019-02-13
Payer: MEDICARE

## 2019-02-13 DIAGNOSIS — M17.31 POST-TRAUMATIC OSTEOARTHRITIS OF RIGHT KNEE: ICD-10-CM

## 2019-02-13 DIAGNOSIS — M17.32 POST-TRAUMATIC OSTEOARTHRITIS OF LEFT KNEE: Primary | ICD-10-CM

## 2019-02-13 DIAGNOSIS — M17.32 POST-TRAUMATIC OSTEOARTHRITIS OF ONE KNEE, LEFT: ICD-10-CM

## 2019-02-13 PROCEDURE — 97110 THERAPEUTIC EXERCISES: CPT | Performed by: SPECIALIST/TECHNOLOGIST

## 2019-02-13 NOTE — PROGRESS NOTES
Daily Note     Today's date: 2019  Patient name: Brenda Talamantes  : 1935  MRN: [id number]  Referring provider: Tejas Rincon DO  Dx:   Encounter Diagnosis     ICD-10-CM    1  Post-traumatic osteoarthritis of left knee M17 32    2  Post-traumatic osteoarthritis of right knee M17 31    3  Post-traumatic osteoarthritis of one knee, left M17 32                   Subjective: Pt reports improvement in L knee pain since starting PT- pt notes positive response to K tape  Objective: See treatment diary below    Precautions: Patient's PMHx is remarkable for GERD without esophagitis, Aortic stenosis, mild, DJD (degenerative joint disease), multiple sites, Osteoporosis, Psoriasis, Glaucoma, and HTN      Daily Treatment Diary     Manual            Kinesio taping to left patellar region in a "U" pattern with base at patellar tendon and tails at medial and lateral patellar regions with 25 % tension upward on tails 10 min MAURILIO CD                                                                  Exercise Diary           treadmill  10 min 2 8 mph  10 min  10 min         Knee flexion stretch off step:L  20sec 5x   NT         Gastrocnemius stretch off step:B:  20sec 5x   20 sec 5x         LAQ:B:  20x  20x  20x         Hip flexion:B:  NT  NT  NT         Hamstring stretch:B:  20sec 5x  20sec 5x  20sec 5x         slr flexion with quad set:L:  20x  20x  20x         SAQ:B:  20x 3sec  20x 3sec  20x 3sec         Bridges  20x 3sec  20x 3sec  20x 3sec         Left Hip abduction in right side lying   NT  20x  20x         Left hip adduction in left side lying  NT  NT  NT         Prone TKE  NT  NT NT         Prone hip extension:L  NT  20x  20x         Prone quad st   NT  20sec 5x  20sec 5x         Prone knee flexion arom:L  NT  NT  NT         Heel slides:L  NT  NT  NT         slr x 3 in standing:B:  NT  20x  NT         Hamstring curls:B:  NT  20x  20x         Mini squats  20x  20x  20x         Lunges:B: NT  NT  NT         Step ups:L 6" NT  20x  20x         Step downs:L 4" NT  20x  20x         Lateral step ups:L: 6" NT  20x  20x         SLS and Tandem stance  NT  NT  NT                          Modalities  2/1 2/8 2/13          CP to left knee PRN  10 min  10 min                                      Assessment: Pt's L knee pain is improving with current interventions- pt remains appropriately challenged with current resistance and repetitions  Tolerated treatment well  Patient would benefit from continued PT to improve L knee strength and stability  Plan: Continue per plan of care  Progress treatment as tolerated

## 2019-02-15 ENCOUNTER — OFFICE VISIT (OUTPATIENT)
Dept: PHYSICAL THERAPY | Age: 84
End: 2019-02-15
Payer: MEDICARE

## 2019-02-15 DIAGNOSIS — M17.32 POST-TRAUMATIC OSTEOARTHRITIS OF ONE KNEE, LEFT: ICD-10-CM

## 2019-02-15 DIAGNOSIS — M17.31 POST-TRAUMATIC OSTEOARTHRITIS OF RIGHT KNEE: ICD-10-CM

## 2019-02-15 DIAGNOSIS — M17.32 POST-TRAUMATIC OSTEOARTHRITIS OF LEFT KNEE: Primary | ICD-10-CM

## 2019-02-15 PROCEDURE — 97140 MANUAL THERAPY 1/> REGIONS: CPT | Performed by: PHYSICAL THERAPIST

## 2019-02-15 PROCEDURE — 97110 THERAPEUTIC EXERCISES: CPT | Performed by: PHYSICAL THERAPIST

## 2019-02-15 NOTE — PROGRESS NOTES
Daily Note     Today's date: 2/15/2019  Patient name: Rikki Bonilla  : 1935  MRN: [id number]  Referring provider: Edu Eric DO  Dx:   Encounter Diagnosis     ICD-10-CM    1  Post-traumatic osteoarthritis of left knee M17 32    2  Post-traumatic osteoarthritis of right knee M17 31    3  Post-traumatic osteoarthritis of one knee, left M17 32                   Subjective: Patient noted left knee pain is at minimal levels overall and weakness and fatigue limits standing based functional activities more than pain aggravation  Objective: See treatment diary below    Precautions: Patient's PMHx is remarkable for GERD without esophagitis, Aortic stenosis, mild, DJD (degenerative joint disease), multiple sites, Osteoporosis, Psoriasis, Glaucoma, and HTN      Daily Treatment Diary     Manual  2/1 2/8 2/13 2/15         Kinesio taping to left patellar region in a "U" pattern with base at patellar tendon and tails at medial and lateral patellar regions with 25 % tension upward on tails 10 min MAURILIO CD declined                                                                 Exercise Diary  2/1 2/6 2/8 2/13 2/15        treadmill  10 min 2 8 mph  10 min  10 min 10 min        Knee flexion stretch off step:L  20sec 5x   NT 20 sec x 5:B:        Gastrocnemius stretch off step:B:  20sec 5x   20 sec 5x 20 sec x 5        LAQ:B:  20x  20x  20x 2 x 10        Hip flexion:B:  NT  NT  NT NT        Hamstring stretch:B:  20sec 5x  20sec 5x  20sec 5x NT        slr flexion with quad set:L:  20x  20x  20x 2 x 10        SAQ:B:  20x 3sec  20x 3sec  20x 3sec 2 x 10        Bridges  20x 3sec  20x 3sec  20x 3sec 2 x 10        Left Hip abduction in right side lying   NT  20x  20x 2 x 10        Left hip adduction in left side lying  NT  NT  NT NT        Prone TKE  NT  NT NT NT        Prone hip extension:L  NT  20x  20x 2 x 10        Prone quad st   NT  20sec 5x  20sec 5x NT        Prone knee flexion arom:L  NT  NT  NT 2 x 10        Heel slides:L  NT  NT  NT NT        slr x 3 in standing:B:  NT  20x  NT 2 x 10        Hamstring curls:B:  NT  20x  20x NT        Mini squats  20x  20x  20x 2 x 10        Lunges:B:  NT  NT  NT 10 x on blue foam        Step ups:L 6" NT  20x  20x 8" x 10        Step downs:L 4" NT  20x  20x NT        Lateral step ups:L: 6" NT  20x  20x NT        SLS and Tandem stance  NT  NT  NT NT                         Modalities  2/1 2/8 2/13 2/15         CP to left knee PRN  10 min  10 min 10 min seated                                     Assessment: Patient presents with left le pain reduction but still exhibits left le fatigue which limits prolonged standing based therapeutic exercises that mimics standing functional activity deficits  Plan: Continue per plan of care  Progress treatment as tolerated

## 2019-02-20 ENCOUNTER — APPOINTMENT (OUTPATIENT)
Dept: PHYSICAL THERAPY | Age: 84
End: 2019-02-20
Payer: MEDICARE

## 2019-02-22 ENCOUNTER — OFFICE VISIT (OUTPATIENT)
Dept: PHYSICAL THERAPY | Age: 84
End: 2019-02-22
Payer: MEDICARE

## 2019-02-22 DIAGNOSIS — M17.31 POST-TRAUMATIC OSTEOARTHRITIS OF RIGHT KNEE: ICD-10-CM

## 2019-02-22 DIAGNOSIS — M17.32 POST-TRAUMATIC OSTEOARTHRITIS OF LEFT KNEE: Primary | ICD-10-CM

## 2019-02-22 DIAGNOSIS — M17.32 POST-TRAUMATIC OSTEOARTHRITIS OF ONE KNEE, LEFT: ICD-10-CM

## 2019-02-22 PROCEDURE — 97112 NEUROMUSCULAR REEDUCATION: CPT

## 2019-02-22 PROCEDURE — 97110 THERAPEUTIC EXERCISES: CPT

## 2019-02-22 NOTE — PROGRESS NOTES
Daily Note     Today's date: 2019  Patient name: Joey Rankin  : 1935  MRN: [id number]  Referring provider: Shamika Donaldson DO  Dx:   Encounter Diagnosis     ICD-10-CM    1  Post-traumatic osteoarthritis of left knee M17 32    2  Post-traumatic osteoarthritis of right knee M17 31    3  Post-traumatic osteoarthritis of one knee, left M17 32                   Subjective: Pt reported no knee pain today  Objective: See treatment diary below    Precautions: Patient's PMHx is remarkable for GERD without esophagitis, Aortic stenosis, mild, DJD (degenerative joint disease), multiple sites, Osteoporosis, Psoriasis, Glaucoma, and HTN      Daily Treatment Diary     Manual  2/1 2/8 2/13 2/15 2/22        Kinesio taping to left patellar region in a "U" pattern with base at patellar tendon and tails at medial and lateral patellar regions with 25 % tension upward on tails 10 min MAURILIO CD declined NT                                                                 Exercise Diary  2/1 2/6 2/8 2/13 2/15 2/22       treadmill  10 min 2 8 mph  10 min  10 min 10 min 10 min        Knee flexion stretch off step:L  20sec 5x   NT 20 sec x 5:B: 20sec 5x        Gastrocnemius stretch off step:B:  20sec 5x   20 sec 5x 20 sec x 5 20sec 5x        LAQ:B:  20x  20x  20x 2 x 10 20x        Hip flexion:B:  NT  NT  NT NT 20x        Hamstring stretch:B:  20sec 5x  20sec 5x  20sec 5x NT 20sec 5x        slr flexion with quad set:L:  20x  20x  20x 2 x 10 20x        SAQ:B:  20x 3sec  20x 3sec  20x 3sec 2 x 10 20x 3sec        Bridges  20x 3sec  20x 3sec  20x 3sec 2 x 10 20x 3sec        Left Hip abduction in right side lying   NT  20x  20x 2 x 10 NT        Left hip adduction in left side lying  NT  NT  NT NT NT        Prone TKE  NT  NT NT NT NT        Prone hip extension:L  NT  20x  20x 2 x 10 20x        Prone quad st   NT  20sec 5x  20sec 5x NT NT        Prone knee flexion arom:L  NT  NT  NT 2 x 10 20x        Heel slides:L  NT  NT  NT NT NT slr x 3 in standing:B:  NT  20x  NT 2 x 10 20x        Hamstring curls:B:  NT  20x  20x NT 20x        Mini squats  20x  20x  20x 2 x 10 20x c       Lunges:B:  NT  NT  NT 10 x on blue foam 20x        Step ups:L 6" NT  20x  20x 8" x 10 NT        Step downs:L 4" NT  20x  20x NT NT        Lateral step ups:L: 6" NT  20x  20x NT 20x        SLS and Tandem stance  NT  NT  NT NT NT                         Modalities  2/1 2/8 2/13 2/15 2/22        CP to left knee PRN  10 min  10 min 10 min seated 10min                                     Assessment: Pt tolerated progression well  Plan: Continue per plan of care  Progress treatment as tolerated

## 2019-02-25 ENCOUNTER — OFFICE VISIT (OUTPATIENT)
Dept: PHYSICAL THERAPY | Age: 84
End: 2019-02-25
Payer: MEDICARE

## 2019-02-25 DIAGNOSIS — M17.32 POST-TRAUMATIC OSTEOARTHRITIS OF ONE KNEE, LEFT: ICD-10-CM

## 2019-02-25 DIAGNOSIS — M17.32 POST-TRAUMATIC OSTEOARTHRITIS OF LEFT KNEE: Primary | ICD-10-CM

## 2019-02-25 DIAGNOSIS — M17.31 POST-TRAUMATIC OSTEOARTHRITIS OF RIGHT KNEE: ICD-10-CM

## 2019-02-25 PROCEDURE — 97112 NEUROMUSCULAR REEDUCATION: CPT

## 2019-02-25 PROCEDURE — 97110 THERAPEUTIC EXERCISES: CPT

## 2019-02-25 NOTE — PROGRESS NOTES
Daily Note     Today's date: 2019  Patient name: Chanda Torrez  : 1935  MRN: [id number]  Referring provider: Susanna Medina DO  Dx:   Encounter Diagnosis     ICD-10-CM    1  Post-traumatic osteoarthritis of left knee M17 32    2  Post-traumatic osteoarthritis of right knee M17 31    3  Post-traumatic osteoarthritis of one knee, left M17 32                   Subjective: Pt reported no knee pain today  Objective: See treatment diary below    Precautions: Patient's PMHx is remarkable for GERD without esophagitis, Aortic stenosis, mild, DJD (degenerative joint disease), multiple sites, Osteoporosis, Psoriasis, Glaucoma, and HTN      Daily Treatment Diary     Manual  2/1 2/8 2/13 2/15 2/22        Kinesio taping to left patellar region in a "U" pattern with base at patellar tendon and tails at medial and lateral patellar regions with 25 % tension upward on tails 10 min MAURILIO CD declined NT                                                                 Exercise Diary  2/1 2/6 2/8 2/13 2/15 2/22 2/25      treadmill  10 min 2 8 mph  10 min  10 min 10 min 10 min  10 min       Knee flexion stretch off step:L  20sec 5x   NT 20 sec x 5:B: 20sec 5x  20sec 5x       Gastrocnemius stretch off step:B:  20sec 5x   20 sec 5x 20 sec x 5 20sec 5x  20sec 5x       LAQ:B:  20x  20x  20x 2 x 10 20x        Hip flexion:B:  NT  NT  NT NT 20x        Hamstring stretch:B:  20sec 5x  20sec 5x  20sec 5x NT 20sec 5x        slr flexion with quad set:L:  20x  20x  20x 2 x 10 20x        SAQ:B:  20x 3sec  20x 3sec  20x 3sec 2 x 10 20x 3sec        Bridges  20x 3sec  20x 3sec  20x 3sec 2 x 10 20x 3sec        Left Hip abduction in right side lying   NT  20x  20x 2 x 10 NT        Left hip adduction in left side lying  NT  NT  NT NT NT        Prone TKE  NT  NT NT NT NT        Prone hip extension:L  NT  20x  20x 2 x 10 20x        Prone quad st   NT  20sec 5x  20sec 5x NT NT        Prone knee flexion arom:L  NT  NT  NT 2 x 10 20x        Heel slides:L  NT  NT  NT NT NT        slr x 3 in standing:B:  NT  20x  NT 2 x 10 20x        Hamstring curls:B:  NT  20x  20x NT 20x  20x       Mini squats  20x  20x  20x 2 x 10 20x c 20x       Lunges:B:  NT  NT  NT 10 x on blue foam 20x        Step ups:L 6" NT  20x  20x 8" x 10 NT  20x 8"       Step downs:L 4" NT  20x  20x NT NT  20x 8"       Lateral step ups:L: 6" NT  20x  20x NT 20x        SLS and Tandem stance  NT  NT  NT NT NT                         Modalities  2/1 2/8 2/13 2/15 2/22        CP to left knee PRN  10 min  10 min 10 min seated 10min                                     Assessment: Pt tolerated progression well  Plan: Continue per plan of care  Progress treatment as tolerated

## 2019-02-27 ENCOUNTER — APPOINTMENT (OUTPATIENT)
Dept: PHYSICAL THERAPY | Age: 84
End: 2019-02-27
Payer: MEDICARE

## 2019-02-27 ENCOUNTER — OFFICE VISIT (OUTPATIENT)
Dept: PHYSICAL THERAPY | Age: 84
End: 2019-02-27
Payer: MEDICARE

## 2019-02-27 DIAGNOSIS — M17.31 POST-TRAUMATIC OSTEOARTHRITIS OF RIGHT KNEE: Primary | ICD-10-CM

## 2019-02-27 DIAGNOSIS — M17.32 POST-TRAUMATIC OSTEOARTHRITIS OF ONE KNEE, LEFT: ICD-10-CM

## 2019-02-27 DIAGNOSIS — M17.32 POST-TRAUMATIC OSTEOARTHRITIS OF LEFT KNEE: ICD-10-CM

## 2019-02-27 PROCEDURE — 97110 THERAPEUTIC EXERCISES: CPT

## 2019-02-27 PROCEDURE — G8985 CARRY GOAL STATUS: HCPCS

## 2019-02-27 PROCEDURE — 97112 NEUROMUSCULAR REEDUCATION: CPT

## 2019-02-27 NOTE — PROGRESS NOTES
Daily Note     Today's date: 2019  Patient name: Hieu Avelar  : 1935  MRN: [id number]  Referring provider: Shanelle Waters DO  Dx:   Encounter Diagnosis     ICD-10-CM    1  Post-traumatic osteoarthritis of right knee M17 31    2  Post-traumatic osteoarthritis of one knee, left M17 32    3  Post-traumatic osteoarthritis of left knee M17 32                   Subjective: Pt reported no knee pain today  Objective: See treatment diary below    Precautions: Patient's PMHx is remarkable for GERD without esophagitis, Aortic stenosis, mild, DJD (degenerative joint disease), multiple sites, Osteoporosis, Psoriasis, Glaucoma, and HTN      Daily Treatment Diary     Manual  2/1 2/8 2/13 2/15 2/22        Kinesio taping to left patellar region in a "U" pattern with base at patellar tendon and tails at medial and lateral patellar regions with 25 % tension upward on tails 10 min MAURILIO CD declined NT                                                                 Exercise Diary  2/1 2/6 2/8 2/13 2/15 2/22 2/25 2/27     treadmill  10 min 2 8 mph  10 min  10 min 10 min 10 min  10 min  10 min      Knee flexion stretch off step:L  20sec 5x   NT 20 sec x 5:B: 20sec 5x  20sec 5x  20sec 5x      Gastrocnemius stretch off step:B:  20sec 5x   20 sec 5x 20 sec x 5 20sec 5x  20sec 5x  NT      LAQ:B:  20x  20x  20x 2 x 10 20x   20x 2 sec      Hip flexion:B:  NT  NT  NT NT 20x   20x 2sec      Hamstring stretch:B:  20sec 5x  20sec 5x  20sec 5x NT 20sec 5x   20sec 5x      slr flexion with quad set:L:  20x  20x  20x 2 x 10 20x   20x      SAQ:B:  20x 3sec  20x 3sec  20x 3sec 2 x 10 20x 3sec   20x 3sec      Bridges  20x 3sec  20x 3sec  20x 3sec 2 x 10 20x 3sec   20x 3sec      Left Hip abduction in right side lying   NT  20x  20x 2 x 10 NT   NT      Left hip adduction in left side lying  NT  NT  NT NT NT   NT      Prone TKE  NT  NT NT NT NT   NT      Prone hip extension:L  NT  20x  20x 2 x 10 20x   NT      Prone quad st   NT  20sec 5x 20sec 5x NT NT   NT      Prone knee flexion arom:L  NT  NT  NT 2 x 10 20x   20x      Heel slides:L  NT  NT  NT NT NT   NT      slr x 3 in standing:B:  NT  20x  NT 2 x 10 20x   20x      Hamstring curls:B:  NT  20x  20x NT 20x  20x  20x      Mini squats  20x  20x  20x 2 x 10 20x c 20x  20x      Lunges:B:  NT  NT  NT 10 x on blue foam 20x   NT      Step ups:L 6" NT  20x  20x 8" x 10 NT  20x 8"  20x      Step downs:L 4" NT  20x  20x NT NT  20x 8"  20x      Lateral step ups:L: 6" NT  20x  20x NT 20x   20x      SLS and Tandem stance  NT  NT  NT NT NT   NT                       Modalities  2/1 2/8 2/13 2/15 2/22        CP to left knee PRN  10 min  10 min 10 min seated 10min                                     Assessment: D/C to HEP      Plan: Continue per plan of care  Progress treatment as tolerated

## 2019-02-27 NOTE — PROGRESS NOTES
PT Evaluation     Today's date: 2019  Patient name: Drea Licea  : 1935  MRN: [id number]  Referring provider: Tamera Hinkle DO  Dx:   Encounter Diagnosis     ICD-10-CM    1  Post-traumatic osteoarthritis of right knee M17 31    2  Post-traumatic osteoarthritis of one knee, left M17 32    3  Post-traumatic osteoarthritis of left knee M17 32        Start Time: 1045  Stop Time: 1145  Total time in clinic (min): 60 minutes    Assessment  Assessment details: PT Reassessment: 2019:  Patient reported the following progress since onset of PT:  Walking better, stair climbing improved, able to resume treadmill exercises, left le strength and mobility improved, able to get into a kneeling position and left knee pain reduction  PT IE:  Patient noted she had a fall 1 5 weeks ago and fell on her left knee  Patient noted she was walking in the snow and did not notice a curb and fell onto her right side creating pain in left knee and thumb  Patient noted she had left knee pain with ambulation immediately after the fall, which she noted has gotten better but she noted stair descent remains pain aggravating  Patient noted she remembered only one other fall 10 years ago  Patient noted she had an x-ray which was - for bony pathology  Patient denies left le edema but noted left patellar region is   Patient denies left le paraesthesias  Patient noted she had left ITB region soreness and weakness 4 weeks ago but it did fully resolve  Patient noted she had left lateral thigh weakness at that has resolve  Patient noted she has a left knee open patellar support neoprene sleeve  Impairments: abnormal or restricted ROM, impaired physical strength and pain with function  Understanding of Dx/Px/POC: excellent   Prognosis: good  Prognosis details: Patient is a 80y o  year old female seen for outpatient PT evaluation with pain and mobility deficits due to left knee pain s/p fall   Patient presents with the following progress since onset of PT: decrease in left knee pain, increase in left le rom and strength, gait and stair climbing, and functional progress  Thus, due to functional and impairment progress patient agrees with PT POC to D/C to hep  Thus, pt provided with final hep and d/c to hep  Goals  Short Term goals 2 - 4 weeks  1  Patient will be independent HEP   MET  2   Patient will report a 25 - 50% decrease in pain complaints  MET  3   Increase strength 1/2 grade  MET  4   Increase ROM 5-10 degrees  MET  Long Term goals 4 - 8 weeks  1  Patient will report elimination of pain complaints  MET  2   Patient will return to all recreational activities without restriction  Partially MET  3   ROM WFL  Partially MET  4   Strength 5/5  Partially MET  5   Patient will report walking improved to normal distance and pace  Partially MET  6   Patient will report stair climbing returned to normal reciprocal pattern  Partially MET  7   Patient will report self foto computerized functional index improved to > = 69  MET      Plan  Patient would benefit from: skilled physical therapy  Planned modality interventions: cryotherapy, TENS, thermotherapy: hydrocollator packs and unattended electrical stimulation  Planned therapy interventions: joint mobilization, manual therapy, massage, aquatic therapy, balance, balance/weight bearing training, neuromuscular re-education, ADL retraining, ADL training, activity modification, patient education, postural training, compression, self care, strengthening, stretching, therapeutic activities, therapeutic exercise, therapeutic training, transfer training, flexibility, functional ROM exercises, gait training, graded activity, graded exercise, graded motor and home exercise program  Frequency: 2x week  Duration in weeks: 8        Subjective Evaluation    History of Present Illness  Mechanism of injury: Patient's PMHx is remarkable for GERD without esophagitis, Aortic stenosis, mild, DJD (degenerative joint disease), multiple sites, Osteoporosis, Psoriasis, Glaucoma, and HTN  Pain  At best pain ratin  At worst pain ratin  Location: left knee pain    Patient Goals  Patient goals for therapy: decreased pain, increased motion and increased strength  Patient goal: Patient's goal:" to eliminate left knee soreness and to regain function"  Objective     Tenderness     Additional Tenderness Details  Patient is now - at lateral patellar region, lateral joint line and lateral femoral epicondyle region  Active Range of Motion   Left Hip   Flexion: 102 degrees   Abduction: 16 degrees     Right Hip   Flexion: 105 degrees   Abduction: 16 degrees   Left Knee   Flexion: 110 degrees   Extension: -4 degrees   Extensor la degrees     Right Knee   Flexion: 115 degrees   Extension: 2 degrees   Extensor la degrees   Left Ankle/Foot   Dorsiflexion (ke): 8 degrees   Plantar flexion: 38 degrees     Right Ankle/Foot   Dorsiflexion (ke): 10 degrees   Plantar flexion: 40 degrees     Additional Active Range of Motion Details  SLR flexion on right at 65 and left at 62 degrees  Strength/Myotome Testing     Left Hip   Planes of Motion   Flexion: 4+  Extension: 5  Abduction: 4+  Adduction: 5    Right Hip   Planes of Motion   Flexion: 5  Extension: 5  Abduction: 5  Adduction: 5    Left Knee   Flexion: 4+  Extension: 4+    Right Knee   Flexion: 5  Extension: 5    Left Ankle/Foot   Dorsiflexion: 4+  Plantar flexion: 5    Right Ankle/Foot   Dorsiflexion: 5  Plantar flexion: 5    Tests     Left Knee   Positive patella-femoral grind  Negative lateral Suleiman, medial Suleiman, patellar apprehension, valgus stress test at 0 degrees, valgus stress test at 30 degrees, varus stress test at 0 degrees and varus stress test at 30 degrees       General Comments:      Knee Comments  Girth Measurements:  Knee:  Suprapatellar region: Right at 44 0  CM and left at 44 0 CM;  Mid patellar region: Right at 41 5 CM and left at 42 0 Cm; Infrapatellar region: Right at 38 6 CM and left at 39 0 Cm  Flowsheet Rows      Most Recent Value   PT/OT G-Codes   Current Score  68   Projected Score  69   Assessment Type  Re-evaluation   G code set  Carrying, Moving & Handling Objects   Carrying, Moving and Handling Objects Current Status ()  CK   Carrying, Moving and Handling Objects Goal Status ()  CK          Precautions: Patient's PMHx is remarkable for GERD without esophagitis, Aortic stenosis, mild, DJD (degenerative joint disease), multiple sites, Osteoporosis, Psoriasis, Glaucoma, and HTN  Daily Treatment Diary     Manual  2/1            Kinesio taping to left patellar region in a "U" pattern with base at patellar tendon and tails at medial and lateral patellar regions with 25 % tension upward on tails 10 min                                                                    Exercise Diary  2/1            treadmill             Knee flexion stretch off step:L             Gastrocnemius stretch off step:B:             LAQ:B:             Hip flexion:B:             Hamstring stretch:B:             slr flexion with quad set:L:             SAQ:B:             Bridges             Left Hip abduction in right side lying              Left hip adduction in left side lying             Prone TKE             Prone hip extension:L             Prone knee flexion self stretch:L             Prone knee flexion arom:L             Heel slides:L             slr x 3 in standing:B:             Hamstring curls:B:             Mini squats             Lunges:B:             Step ups:L 6"            Step downs:L 4"            Lateral step ups:L: 6"            SLS and Tandem stance                              Modalities  2/1            CP to left knee PRN

## 2019-02-28 ENCOUNTER — APPOINTMENT (OUTPATIENT)
Dept: PHYSICAL THERAPY | Age: 84
End: 2019-02-28
Payer: MEDICARE

## 2019-04-15 DIAGNOSIS — E78.2 MIXED HYPERLIPIDEMIA: ICD-10-CM

## 2019-04-15 DIAGNOSIS — K21.9 GERD WITHOUT ESOPHAGITIS: ICD-10-CM

## 2019-04-15 RX ORDER — ROSUVASTATIN CALCIUM 10 MG/1
TABLET, COATED ORAL
Qty: 90 TABLET | Refills: 0 | Status: SHIPPED | OUTPATIENT
Start: 2019-04-15 | End: 2019-07-14 | Stop reason: SDUPTHER

## 2019-04-15 RX ORDER — OMEPRAZOLE 20 MG/1
CAPSULE, DELAYED RELEASE ORAL
Qty: 90 CAPSULE | Refills: 0 | Status: SHIPPED | OUTPATIENT
Start: 2019-04-15 | End: 2019-07-14 | Stop reason: SDUPTHER

## 2019-05-01 ENCOUNTER — APPOINTMENT (OUTPATIENT)
Dept: LAB | Age: 84
End: 2019-05-01
Payer: MEDICARE

## 2019-05-01 ENCOUNTER — TELEPHONE (OUTPATIENT)
Dept: INTERNAL MEDICINE CLINIC | Facility: CLINIC | Age: 84
End: 2019-05-01

## 2019-05-01 DIAGNOSIS — E78.2 MIXED HYPERLIPIDEMIA: ICD-10-CM

## 2019-05-01 DIAGNOSIS — R73.01 IMPAIRED FASTING GLUCOSE: ICD-10-CM

## 2019-05-01 LAB
ALBUMIN SERPL BCP-MCNC: 3.9 G/DL (ref 3.5–5)
ALP SERPL-CCNC: 71 U/L (ref 46–116)
ALT SERPL W P-5'-P-CCNC: 23 U/L (ref 12–78)
ANION GAP SERPL CALCULATED.3IONS-SCNC: 1 MMOL/L (ref 4–13)
AST SERPL W P-5'-P-CCNC: 16 U/L (ref 5–45)
BASOPHILS # BLD AUTO: 0.04 THOUSANDS/ΜL (ref 0–0.1)
BASOPHILS NFR BLD AUTO: 1 % (ref 0–1)
BILIRUB SERPL-MCNC: 0.51 MG/DL (ref 0.2–1)
BUN SERPL-MCNC: 17 MG/DL (ref 5–25)
CALCIUM SERPL-MCNC: 9 MG/DL (ref 8.3–10.1)
CHLORIDE SERPL-SCNC: 104 MMOL/L (ref 100–108)
CHOLEST SERPL-MCNC: 209 MG/DL (ref 50–200)
CO2 SERPL-SCNC: 31 MMOL/L (ref 21–32)
CREAT SERPL-MCNC: 0.82 MG/DL (ref 0.6–1.3)
EOSINOPHIL # BLD AUTO: 0.11 THOUSAND/ΜL (ref 0–0.61)
EOSINOPHIL NFR BLD AUTO: 2 % (ref 0–6)
ERYTHROCYTE [DISTWIDTH] IN BLOOD BY AUTOMATED COUNT: 13.5 % (ref 11.6–15.1)
EST. AVERAGE GLUCOSE BLD GHB EST-MCNC: 140 MG/DL
GFR SERPL CREATININE-BSD FRML MDRD: 77 ML/MIN/1.73SQ M
GLUCOSE P FAST SERPL-MCNC: 105 MG/DL (ref 65–99)
HBA1C MFR BLD: 6.5 % (ref 4.2–6.3)
HCT VFR BLD AUTO: 39.6 % (ref 34.8–46.1)
HDLC SERPL-MCNC: 110 MG/DL (ref 40–60)
HGB BLD-MCNC: 12.3 G/DL (ref 11.5–15.4)
IMM GRANULOCYTES # BLD AUTO: 0.01 THOUSAND/UL (ref 0–0.2)
IMM GRANULOCYTES NFR BLD AUTO: 0 % (ref 0–2)
LDLC SERPL CALC-MCNC: 86 MG/DL (ref 0–100)
LYMPHOCYTES # BLD AUTO: 1.81 THOUSANDS/ΜL (ref 0.6–4.47)
LYMPHOCYTES NFR BLD AUTO: 40 % (ref 14–44)
MCH RBC QN AUTO: 30.4 PG (ref 26.8–34.3)
MCHC RBC AUTO-ENTMCNC: 31.1 G/DL (ref 31.4–37.4)
MCV RBC AUTO: 98 FL (ref 82–98)
MONOCYTES # BLD AUTO: 0.45 THOUSAND/ΜL (ref 0.17–1.22)
MONOCYTES NFR BLD AUTO: 10 % (ref 4–12)
NEUTROPHILS # BLD AUTO: 2.1 THOUSANDS/ΜL (ref 1.85–7.62)
NEUTS SEG NFR BLD AUTO: 47 % (ref 43–75)
NONHDLC SERPL-MCNC: 99 MG/DL
NRBC BLD AUTO-RTO: 0 /100 WBCS
PLATELET # BLD AUTO: 212 THOUSANDS/UL (ref 149–390)
PMV BLD AUTO: 10 FL (ref 8.9–12.7)
POTASSIUM SERPL-SCNC: 4.2 MMOL/L (ref 3.5–5.3)
PROT SERPL-MCNC: 7.4 G/DL (ref 6.4–8.2)
RBC # BLD AUTO: 4.04 MILLION/UL (ref 3.81–5.12)
SODIUM SERPL-SCNC: 136 MMOL/L (ref 136–145)
TRIGL SERPL-MCNC: 65 MG/DL
WBC # BLD AUTO: 4.52 THOUSAND/UL (ref 4.31–10.16)

## 2019-05-01 PROCEDURE — 83036 HEMOGLOBIN GLYCOSYLATED A1C: CPT

## 2019-05-01 PROCEDURE — 36415 COLL VENOUS BLD VENIPUNCTURE: CPT

## 2019-05-01 PROCEDURE — 80061 LIPID PANEL: CPT

## 2019-05-01 PROCEDURE — 80053 COMPREHEN METABOLIC PANEL: CPT

## 2019-05-01 PROCEDURE — 85025 COMPLETE CBC W/AUTO DIFF WBC: CPT

## 2019-05-07 ENCOUNTER — OFFICE VISIT (OUTPATIENT)
Dept: INTERNAL MEDICINE CLINIC | Facility: CLINIC | Age: 84
End: 2019-05-07
Payer: MEDICARE

## 2019-05-07 VITALS
DIASTOLIC BLOOD PRESSURE: 70 MMHG | WEIGHT: 174 LBS | SYSTOLIC BLOOD PRESSURE: 118 MMHG | HEART RATE: 86 BPM | HEIGHT: 63 IN | RESPIRATION RATE: 16 BRPM | BODY MASS INDEX: 30.83 KG/M2 | OXYGEN SATURATION: 97 %

## 2019-05-07 DIAGNOSIS — M81.0 AGE-RELATED OSTEOPOROSIS WITHOUT CURRENT PATHOLOGICAL FRACTURE: ICD-10-CM

## 2019-05-07 DIAGNOSIS — K21.9 GERD WITHOUT ESOPHAGITIS: ICD-10-CM

## 2019-05-07 DIAGNOSIS — M15.9 PRIMARY OSTEOARTHRITIS INVOLVING MULTIPLE JOINTS: ICD-10-CM

## 2019-05-07 DIAGNOSIS — Z00.00 MEDICARE ANNUAL WELLNESS VISIT, SUBSEQUENT: Primary | ICD-10-CM

## 2019-05-07 DIAGNOSIS — E78.2 MIXED HYPERLIPIDEMIA: ICD-10-CM

## 2019-05-07 DIAGNOSIS — R73.01 IMPAIRED FASTING GLUCOSE: ICD-10-CM

## 2019-05-07 PROCEDURE — 99214 OFFICE O/P EST MOD 30 MIN: CPT | Performed by: INTERNAL MEDICINE

## 2019-05-07 PROCEDURE — 96372 THER/PROPH/DIAG INJ SC/IM: CPT | Performed by: INTERNAL MEDICINE

## 2019-05-07 PROCEDURE — G0439 PPPS, SUBSEQ VISIT: HCPCS | Performed by: INTERNAL MEDICINE

## 2019-07-02 ENCOUNTER — HOSPITAL ENCOUNTER (OUTPATIENT)
Dept: RADIOLOGY | Age: 84
Discharge: HOME | End: 2019-07-02
Payer: MEDICARE

## 2019-07-02 DIAGNOSIS — M81.0 AGE-RELATED OSTEOPOROSIS WITHOUT CURRENT PATHOLOGICAL FRACTURE: ICD-10-CM

## 2019-07-02 PROCEDURE — 77080 DXA BONE DENSITY AXIAL: CPT

## 2019-07-14 DIAGNOSIS — K21.9 GERD WITHOUT ESOPHAGITIS: ICD-10-CM

## 2019-07-14 DIAGNOSIS — E78.2 MIXED HYPERLIPIDEMIA: ICD-10-CM

## 2019-07-14 RX ORDER — OMEPRAZOLE 20 MG/1
CAPSULE, DELAYED RELEASE ORAL
Qty: 90 CAPSULE | Refills: 1 | Status: SHIPPED | OUTPATIENT
Start: 2019-07-14 | End: 2019-11-05 | Stop reason: SDUPTHER

## 2019-07-14 RX ORDER — ROSUVASTATIN CALCIUM 10 MG/1
TABLET, COATED ORAL
Qty: 90 TABLET | Refills: 1 | Status: SHIPPED | OUTPATIENT
Start: 2019-07-14 | End: 2019-11-05 | Stop reason: SDUPTHER

## 2019-08-26 ENCOUNTER — EVALUATION (OUTPATIENT)
Dept: PHYSICAL THERAPY | Age: 84
End: 2019-08-26
Payer: MEDICARE

## 2019-08-26 ENCOUNTER — TRANSCRIBE ORDERS (OUTPATIENT)
Dept: PHYSICAL THERAPY | Age: 84
End: 2019-08-26

## 2019-08-26 DIAGNOSIS — M25.561 ACUTE PAIN OF RIGHT KNEE: Primary | ICD-10-CM

## 2019-08-26 DIAGNOSIS — M25.561 CHRONIC PAIN OF RIGHT KNEE: Primary | ICD-10-CM

## 2019-08-26 DIAGNOSIS — M17.11 ARTHRITIS OF KNEE, RIGHT: ICD-10-CM

## 2019-08-26 DIAGNOSIS — G89.29 CHRONIC PAIN OF RIGHT KNEE: Primary | ICD-10-CM

## 2019-08-26 PROCEDURE — 97161 PT EVAL LOW COMPLEX 20 MIN: CPT | Performed by: PHYSICAL THERAPIST

## 2019-08-26 PROCEDURE — 97110 THERAPEUTIC EXERCISES: CPT | Performed by: PHYSICAL THERAPIST

## 2019-08-26 NOTE — LETTER
2019    Vanessa Chi DO  [facility] [address] [facility]    Patient: Bianka Parekh   YOB: 1935   Date of Visit: 2019     Encounter Diagnosis     ICD-10-CM    1  Acute pain of right knee M25 561    2  Arthritis of knee, right M17 11        Dear Dr Ramos Abo:    Thank you for your recent referral of Bianka Parekh  Please review the attached evaluation summary from Dori's recent visit  Please verify that you agree with the plan of care by signing the attached order  If you have any questions or concerns, please do not hesitate to call  I sincerely appreciate the opportunity to share in the care of one of your patients and hope to have another opportunity to work with you in the near future  Sincerely,    Odalis Roche, PT      Referring Provider:      I certify that I have read the below Plan of Care and certify the need for these services furnished under this plan of treatment while under my care  Vanessa Chi DO  [address] [place] [address] Street: [phone]          PT Evaluation     Today's date: 2019  Patient name: Bainka Parekh  : 1935  MRN: [id number]  Referring provider: Dee Abdi DO  Dx:   Encounter Diagnosis     ICD-10-CM    1  Acute pain of right knee M25 561    2  Arthritis of knee, right M17 11        Start Time: 0900  Stop Time: 09  Total time in clinic (min): 45 minutes    Assessment/Plan  Bianka Parekh is a 80 y o  female who was referred to physical therapy for management of right knee pain  Primary impairments include asymmetrical range of motion, decreased hip strength, and gait abnormality  Consequently, patient has difficulty completing ADLs including tub transfers and ambulation  Herminio Emmanuel would benefit from skilled intervention to address all deficits and improve functional capability    Patient is a good candidate for therapy, pending compliance with HEP and 2x weekly participation  Thank you for the referral and please do not hesitate to contact me with any questions or concerns regarding Jose care! Plan  Frequency:2x week   Duration in visits: 12  Duration in weeks: 6   Therapeutic exercise/activity, neuromuscular reeducation, manual therapy, and modalities  Patient understands and agrees to plan of care  Goals  Short Term--4 weeks  1  Patient will report 2 point decrease in pain symptoms  2  1/2 point increase in MMT scores  3  No pain with knee special testing  Long Term--By Discharge  1  Patient will achieve expected FOTO score  2  Patient will perform tub transfers without exacerbation of knee pain  3  Patient will demonstrate 10 sec SLS, bilat  Patient's Goal: Decrease pain  Subjective  History   Date of Onset: Approximately 2 weeks ago Description: Insidious, immediate onset of inferior knee pain that she described as "weakness" and as if her knee would "give way "  However, she denies any actual episodes of locking/buckling  This past weekend pain moved posteriorly and she felt as if she could not bear any weight through her RLE  She managed pain with use of Volatarin gel and ice  Patient followed up with specialist, who attributed pain to OA pathology  Patient has had positive response to physical therapy in the past so she requested to trial therapy first   Patient reports full resolution of posterior pain this morning  She only complains of "weakness" sensation just posterior to patella that is worse with first couple of steps when she has been sitting/immobile for a while  Symptoms resolve once she's been moving for a couple of minutes  Pain is also aggravated with stepping into her tub  She denies any associated numbness or paresthesia       Pain at best: 0/10  Pain at worst: 7/10    Social History  Annette Bobby lives indpendently in an apartment with a flight tof steps to get to the main level and a second flight of steps to get to her treadmill  Patient is not employed  Objective    GAIT: antalgic, RLE with decreased L step length  Squat assess: Patient reports mild discomfort > parallel  SLS: RLE: 2 sec, LLE: 3 sec           MMT    Hip         R          L   Flex  4 5   Extn  4- 4+   Abd  3- 3-   Add  NT NT   IR  4+ 4+   ER  4- (pain) 4                 MMT         AROM         PROM   Knee      R          L         R          L           R   Flex  4 4 115 125 120   Extn   4 4+ +5 (hyper) -5 (hypo)      Palpation: (+) TTP R medial joint line      Knee: post drawer = neg anterior draw test= neg   Valgus stress test= pos R varus stress test = neg   Suleiman test = neg    Patella mobility test= normal   Thessaly= neg      Flowsheet Rows      Most Recent Value   PT/OT G-Codes   Current Score  47   Projected Score  62      Flexibility (R/L):   Hamstrings= mod/mod tightness   Quadriceps/HF=  Severe/Severe tightness       Precautions: Patient's PMHx is remarkable for GERD without esophagitis, Aortic stenosis, mild, DJD (degenerative joint disease), multiple sites, Osteoporosis, Psoriasis, Glaucoma, and HTN      Manual  8/26            Knee PROM                                                                     Exercise Diary  8/26            Quad sets w/ ankle prop 20x5" HEP            Heel slides with strap 10x10" HEP                         bike             gastroc stretch with strep  Add to HEP           HS stretch with strap  Add to HEP           Quad stretch with strap  Add to HEP                        SLR             bridging             clamshells             Prone hip ext                          sidestepping             Anterior step ups             3-way hip kick             Mini-squats             HR                                           Modalities              Ice PRN

## 2019-08-26 NOTE — LETTER
2019    Angel Luis Pratt DO  [facility] [address] [facility]    Patient: Rossana Vasquez   YOB: 1935   Date of Visit: 2019     Encounter Diagnosis     ICD-10-CM    1  Acute pain of right knee M25 561    2  Arthritis of knee, right M17 11        Dear Dr Paulino Huerta:    Thank you for your recent referral of Rossana Vasquez  Please review the attached evaluation summary from Dori's recent visit  Please verify that you agree with the plan of care by signing the attached order  If you have any questions or concerns, please do not hesitate to call  I sincerely appreciate the opportunity to share in the care of one of your patients and hope to have another opportunity to work with you in the near future  Sincerely,    Enmanuel Bustillo, PT      Referring Provider:      I certify that I have read the below Plan of Care and certify the need for these services furnished under this plan of treatment while under my care  Angel Luis Pratt DO  [address] [facility] [address]: [phone]          PT Evaluation     Today's date: 2019  Patient name: Rossana Vasquez  : 1935  MRN: [id number]  Referring provider: Angelita Richmond DO  Dx:   Encounter Diagnosis     ICD-10-CM    1  Acute pain of right knee M25 561    2  Arthritis of knee, right M17 11        Start Time: 0900  Stop Time: 945  Total time in clinic (min): 45 minutes    Assessment/Plan    Subjective  History   Date of Onset: Approximately 2 weeks ago Description: Insidious, immediate onset of inferior knee pain that she described as "weakness" and as if her knee would "give way "  However, she denies any actual episodes of locking/buckling  This past weekend pain moved posteriorly and she felt as if she could not bear any weight through her RLE  She managed pain with use of Volatarin gel and ice    Patient followed up with specialist, who attributed pain to OA pathology  Patient has had positive response to physical therapy in the past so she requested to trial therapy first   Patient reports full resolution of posterior pain this morning  She only complains of "weakness" sensation just posterior to patella that is worse with first couple of steps when she has been sitting/immobile for a while  Symptoms resolve once she's been moving for a couple of minutes  Pain is also aggravated with stepping into her tub  She denies any associated numbness or paresthesia  Pain at best: 0/10  Pain at worst: 7/10    Social History  Dori lives indpendently in an apartment with a flight tof steps to get to the main level and a second flight of steps to get to her treadmill  Patient is not employed  Objective    GAIT: antalgic, RLE with decreased L step length  Squat assess: Patient reports mild discomfort > parallel  SLS: RLE: 2 sec, LLE: 3 sec           MMT    Hip         R          L   Flex  4 5   Extn  4- 4+   Abd  3- 3-   Add  NT NT   IR  4+ 4+   ER  4- (pain) 4                 MMT         AROM         PROM   Knee      R          L         R          L           R   Flex  4 4 115 125 120   Extn   4 4+ +5 (hyper) -5 (hypo)      Palpation: (+) TTP R medial joint line      Knee: post drawer = neg anterior draw test= neg   Valgus stress test= pos R varus stress test = neg   Suleiman test = neg    Patella mobility test= normal   Thessaly= neg      Flowsheet Rows      Most Recent Value   PT/OT G-Codes   Current Score  47   Projected Score  62      Flexibility (R/L):   Hamstrings= mod/mod tightness   Quadriceps/HF=  Severe/Severe tightness       Precautions: Patient's PMHx is remarkable for GERD without esophagitis, Aortic stenosis, mild, DJD (degenerative joint disease), multiple sites, Osteoporosis, Psoriasis, Glaucoma, and HTN      Manual  8/26            Knee PROM                                                                     Exercise Diary  8/26 Quad sets w/ ankle prop 20x5" HEP            Heel slides with strap 10x10" HEP                         bike             gastroc stretch with strep  Add to HEP           HS stretch with strap  Add to HEP           Quad stretch with strap  Add to HEP                        SLR             bridging             clamshells             Prone hip ext                          sidestepping             Anterior step ups             3-way hip kick             Mini-squats             HR                                           Modalities              Ice PRN

## 2019-08-26 NOTE — PROGRESS NOTES
PT Evaluation     Today's date: 2019  Patient name: Elizabeth Dorsey  : 1935  MRN: [id number]  Referring provider: Mana Thornton DO  Dx:   Encounter Diagnosis     ICD-10-CM    1  Acute pain of right knee M25 561    2  Arthritis of knee, right M17 11        Start Time: 0900  Stop Time: 0945  Total time in clinic (min): 45 minutes    Assessment/Plan  Elizabeth Dorsey is a 80 y o  female who was referred to physical therapy for management of right knee pain  Primary impairments include asymmetrical range of motion, decreased hip strength, and gait abnormality  Consequently, patient has difficulty completing ADLs including tub transfers and ambulation  Amanda Mcmahan would benefit from skilled intervention to address all deficits and improve functional capability  Patient is a good candidate for therapy, pending compliance with HEP and 2x weekly participation  Thank you for the referral and please do not hesitate to contact me with any questions or concerns regarding Doris care! Plan  Frequency:2x week   Duration in visits: 12  Duration in weeks: 6   Therapeutic exercise/activity, neuromuscular reeducation, manual therapy, and modalities  Patient understands and agrees to plan of care  Goals  Short Term--4 weeks  1  Patient will report 2 point decrease in pain symptoms  2  1/2 point increase in MMT scores  3  No pain with knee special testing  Long Term--By Discharge  1  Patient will achieve expected FOTO score  2  Patient will perform tub transfers without exacerbation of knee pain  3  Patient will demonstrate 10 sec SLS, bilat  Patient's Goal: Decrease pain  Subjective  History   Date of Onset: Approximately 2 weeks ago Description: Insidious, immediate onset of inferior knee pain that she described as "weakness" and as if her knee would "give way "  However, she denies any actual episodes of locking/buckling    This past weekend pain moved posteriorly and she felt as if she could not bear any weight through her RLE  She managed pain with use of Volatarin gel and ice  Patient followed up with specialist, who attributed pain to OA pathology  Patient has had positive response to physical therapy in the past so she requested to trial therapy first   Patient reports full resolution of posterior pain this morning  She only complains of "weakness" sensation just posterior to patella that is worse with first couple of steps when she has been sitting/immobile for a while  Symptoms resolve once she's been moving for a couple of minutes  Pain is also aggravated with stepping into her tub  She denies any associated numbness or paresthesia  Pain at best: 0/10  Pain at worst: 7/10    Social History  Dori lives indpendently in an apartment with a flight tof steps to get to the main level and a second flight of steps to get to her treadmill  Patient is not employed  Objective    GAIT: antalgic, RLE with decreased L step length  Squat assess: Patient reports mild discomfort > parallel  SLS: RLE: 2 sec, LLE: 3 sec           MMT    Hip         R          L   Flex  4 5   Extn  4- 4+   Abd  3- 3-   Add  NT NT   IR  4+ 4+   ER  4- (pain) 4                 MMT         AROM         PROM   Knee      R          L         R          L           R   Flex  4 4 115 125 120   Extn   4 4+ +5 (hyper) -5 (hypo)      Palpation: (+) TTP R medial joint line      Knee: post drawer = neg anterior draw test= neg   Valgus stress test= pos R varus stress test = neg   Suleiman test = neg    Patella mobility test= normal   Thessaly= neg      Flowsheet Rows      Most Recent Value   PT/OT G-Codes   Current Score  47   Projected Score  62      Flexibility (R/L):   Hamstrings= mod/mod tightness   Quadriceps/HF=  Severe/Severe tightness       Precautions: Patient's PMHx is remarkable for GERD without esophagitis, Aortic stenosis, mild, DJD (degenerative joint disease), multiple sites, Osteoporosis, Psoriasis, Glaucoma, and HTN      Manual  8/26            Knee PROM                                                                     Exercise Diary  8/26            Quad sets w/ ankle prop 20x5" HEP            Heel slides with strap 10x10" HEP                         bike             gastroc stretch with strep  Add to HEP           HS stretch with strap  Add to HEP           Quad stretch with strap  Add to HEP                        SLR             bridging             clamshells             Prone hip ext                          sidestepping             Anterior step ups             3-way hip kick             Mini-squats             HR                                           Modalities              Ice PRN

## 2019-08-30 ENCOUNTER — OFFICE VISIT (OUTPATIENT)
Dept: PHYSICAL THERAPY | Age: 84
End: 2019-08-30
Payer: MEDICARE

## 2019-08-30 DIAGNOSIS — M17.11 ARTHRITIS OF KNEE, RIGHT: ICD-10-CM

## 2019-08-30 DIAGNOSIS — M25.561 ACUTE PAIN OF RIGHT KNEE: Primary | ICD-10-CM

## 2019-08-30 PROCEDURE — 97110 THERAPEUTIC EXERCISES: CPT | Performed by: PHYSICAL THERAPIST

## 2019-08-30 NOTE — PROGRESS NOTES
Daily Note     Today's date: 2019  Patient name: Samuel Yoon  : 1935  MRN: [id number]  Referring provider: Dora Hernandez DO  Dx:   Encounter Diagnosis     ICD-10-CM    1  Acute pain of right knee M25 561    2  Arthritis of knee, right M17 11                   Subjective: Patient reported intermittent right knee pain at minimal levels typically popliteal region or proximal tibial region which weight bearing activities  Objective: See treatment diary below      Assessment: Tolerated treatment well  Patient exhibited good technique with therapeutic exercises  Patient presented with out right knee pain aggravation with new additions to therapeutic exercises  Thus, new written hep provided  Plan: Continue per plan of care        Precautions: Patient's PMHx is remarkable for GERD without esophagitis, Aortic stenosis, mild, DJD (degenerative joint disease), multiple sites, Osteoporosis, Psoriasis, Glaucoma, and HTN      Manual              Knee PROM                                                                     Exercise Diary             Quad sets w/ ankle prop 20x5" HEP            Heel slides with strap 10x10" HEP                         bike  8 min           gastroc stretch with strep:B:  20 sec x 5           HS stretch with strap:B:  20 sec x 5           Quad stretch with strap  Add to HEP                        SLR:B:  2 x 10           bridging  3 sec x 20           SAQ:B:  3 sec x 20           clamshells  Bend knee fall outs x 20 on each le           Prone hip ext  2 x 10                        sidestepping             Anterior step ups             3-way hip kick             Mini-squats             HR                                           Modalities              Ice PRN

## 2019-09-03 ENCOUNTER — OFFICE VISIT (OUTPATIENT)
Dept: PHYSICAL THERAPY | Age: 84
End: 2019-09-03
Payer: MEDICARE

## 2019-09-03 DIAGNOSIS — M25.561 ACUTE PAIN OF RIGHT KNEE: Primary | ICD-10-CM

## 2019-09-03 DIAGNOSIS — M17.11 ARTHRITIS OF KNEE, RIGHT: ICD-10-CM

## 2019-09-03 PROCEDURE — 97110 THERAPEUTIC EXERCISES: CPT

## 2019-09-03 PROCEDURE — 97112 NEUROMUSCULAR REEDUCATION: CPT

## 2019-09-03 NOTE — PROGRESS NOTES
Daily Note     Today's date: 9/3/2019  Patient name: Savi Teresa  : 1935  MRN: [id number]  Referring provider: Lori Brennan DO  Dx:   Encounter Diagnosis     ICD-10-CM    1  Acute pain of right knee M25 561    2  Arthritis of knee, right M17 11                   Subjective: Pt reported R knee pain with bending only  Objective: See treatment diary below      Assessment: Pt experienced restricted R knee pain with WB flexion and descending stairs exercises only  Plan: Continue per plan of care        Precautions: Patient's PMHx is remarkable for GERD without esophagitis, Aortic stenosis, mild, DJD (degenerative joint disease), multiple sites, Osteoporosis, Psoriasis, Glaucoma, and HTN      Manual              Knee PROM                                                                     Exercise Diary  8/26 8/30 9/3          Quad sets w/ ankle prop 20x5" HEP  20x 5sec          Heel slides with strap 10x10" HEP  NT                        bike  8 min 10 min           gastroc stretch with strep:B:  20 sec x 5 20sec 5x           HS stretch with strap:B:  20 sec x 5 20sec 5x           Quad stretch with strap  Add to HEP 20sec 5x           Hip iso add B    20 3sec           SLR:B:  2 x 10 20x           bridging  3 sec x 20 20x 3sec           SAQ:B:  3 sec x 20 20x 2#           clamshells  Bend knee fall outs x 20 on each le 20x 3sec          Prone hip ext  2 x 10 20x 2#           Prone flex    20x 2#           sidestepping   NT           Anterior step ups   NT           3-way hip kick   NT           Mini-squats   20x           HR   NT                                         Modalities              Ice PRN

## 2019-09-05 ENCOUNTER — OFFICE VISIT (OUTPATIENT)
Dept: PHYSICAL THERAPY | Age: 84
End: 2019-09-05
Payer: MEDICARE

## 2019-09-05 DIAGNOSIS — M25.561 ACUTE PAIN OF RIGHT KNEE: Primary | ICD-10-CM

## 2019-09-05 DIAGNOSIS — M17.11 ARTHRITIS OF KNEE, RIGHT: ICD-10-CM

## 2019-09-05 PROCEDURE — 97112 NEUROMUSCULAR REEDUCATION: CPT

## 2019-09-05 PROCEDURE — 97110 THERAPEUTIC EXERCISES: CPT

## 2019-09-05 NOTE — PROGRESS NOTES
Daily Note     Today's date: 2019  Patient name: Merna Buenrostro  : 1935  MRN: [id number]  Referring provider: Arielle Barba DO  Dx:   Encounter Diagnosis     ICD-10-CM    1  Acute pain of right knee M25 561    2  Arthritis of knee, right M17 11                   Subjective: Pt reported R knee pain with bending only  Objective: See treatment diary below      Assessment: Pt experienced restricted R knee pain with WB flexion and descending stairs exercises only  Plan: Continue per plan of care        Precautions: Patient's PMHx is remarkable for GERD without esophagitis, Aortic stenosis, mild, DJD (degenerative joint disease), multiple sites, Osteoporosis, Psoriasis, Glaucoma, and HTN      Manual              Knee PROM                                                                     Exercise Diary  8/26 8/30 9/3 9/5         Quad sets w/ ankle prop 20x5" HEP  20x 5sec          Heel slides with strap 10x10" HEP  NT                        bike  8 min 10 min  10 min         gastroc stretch with strep:B:  20 sec x 5 20sec 5x           HS stretch with strap:B:  20 sec x 5 20sec 5x           Quad stretch with strap  Add to HEP 20sec 5x           Hip iso add B    20 3sec           SLR:B:  2 x 10 20x           bridging  3 sec x 20 20x 3sec           SAQ:B:  3 sec x 20 20x 2#           clamshells  Bend knee fall outs x 20 on each le 20x 3sec          Prone hip ext  2 x 10 20x 2#           Prone flex    20x 2#           sidestepping   NT           Anterior step ups   NT           3-way hip kick   NT           Mini-squats   20x           HR   NT                                         Modalities              Ice PRN

## 2019-09-05 NOTE — PROGRESS NOTES
Daily Note     Today's date: 2019  Patient name: Bill De La Rosa  : 1935  MRN: [id number]  Referring provider: Tyshawn Palacio DO  Dx:   Encounter Diagnosis     ICD-10-CM    1  Acute pain of right knee M25 561    2  Arthritis of knee, right M17 11                   Subjective: Pt reported pain at R knee with bending and stairs  Objective: See treatment diary below      Assessment: Pt experinced no pain with exercises, progress as able       Plan: Continue per plan of care        Precautions: Patient's PMHx is remarkable for GERD without esophagitis, Aortic stenosis, mild, DJD (degenerative joint disease), multiple sites, Osteoporosis, Psoriasis, Glaucoma, and HTN      Manual              Knee PROM                                                                     Exercise Diary  8/26 8/30 9/3 9/5         Quad sets w/ ankle prop 20x5" HEP  20x 5sec          Heel slides with strap 10x10" HEP  NT                        bike  8 min 10 min  10 min         gastroc stretch with strep:B:  20 sec x 5 20sec 5x  5x 20 sec          HS stretch with strap:B:  20 sec x 5 20sec 5x  20sec 5x          Quad stretch with strap  Add to HEP 20sec 5x           Hip iso add B    20 3sec  20x 3sec          SLR:B:  2 x 10 20x  20x          bridging  3 sec x 20 20x 3sec  20x 3sec          SAQ:B:  3 sec x 20 20x 2#  20x 2#          clamshells  Bend knee fall outs x 20 on each le 20x 3sec 20x 3sec with TB RTB          Prone hip ext  2 x 10 20x 2#  20x 2#         Prone flex    20x 2#  20x 2#         sidestepping   NT  NT          Anterior step ups   NT  NT          3-way hip kick   NT           Mini-squats   20x  20x          HR   NT           SAQ     20x 2#                           Modalities              Ice PRN

## 2019-09-09 ENCOUNTER — OFFICE VISIT (OUTPATIENT)
Dept: PHYSICAL THERAPY | Age: 84
End: 2019-09-09
Payer: MEDICARE

## 2019-09-09 DIAGNOSIS — M25.561 ACUTE PAIN OF RIGHT KNEE: Primary | ICD-10-CM

## 2019-09-09 DIAGNOSIS — M17.11 ARTHRITIS OF KNEE, RIGHT: ICD-10-CM

## 2019-09-09 PROCEDURE — 97112 NEUROMUSCULAR REEDUCATION: CPT | Performed by: PHYSICAL MEDICINE & REHABILITATION

## 2019-09-09 PROCEDURE — 97110 THERAPEUTIC EXERCISES: CPT | Performed by: PHYSICAL MEDICINE & REHABILITATION

## 2019-09-09 NOTE — PROGRESS NOTES
Daily Note     Today's date: 2019  Patient name: Naz Huang  : 1935  MRN: [id number]  Referring provider: Jax Cummins DO  Dx:   Encounter Diagnosis     ICD-10-CM    1  Acute pain of right knee M25 561    2  Arthritis of knee, right M17 11                   Subjective: Patient notes increased pain over the weekend secondary to spending increased time travelling in car with knee bent  Improved symptoms with return to HEP performance and use of Voltaren gel  Objective: See treatment diary below      Assessment: Good tolerance to intervention as charted, patient notes challenge but no increased pain  Intermittent cueing for form maintenance throughout with good carryover  Will continue to progress as able  Plan: Continue per plan of care        Precautions: Patient's PMHx is remarkable for GERD without esophagitis, Aortic stenosis, mild, DJD (degenerative joint disease), multiple sites, Osteoporosis, Psoriasis, Glaucoma, and HTN      Manual              Knee PROM                                                                     Exercise Diary  8/26 8/30 9/3 9/5 9/9        Quad sets w/ ankle prop 20x5" HEP  20x 5sec          Heel slides with strap 10x10" HEP  NT                        bike  8 min 10 min  10 min 10'        gastroc stretch with strap:B:  20 sec x 5 20sec 5x  5x 20 sec  5x20"        HS stretch with strap:B:  20 sec x 5 20sec 5x  20sec 5x  5x20"        Quad stretch with strap  Add to HEP 20sec 5x           Hip iso add B    20 3sec  20x 3sec  np        SLR:B:  2 x 10 20x  20x  2#, 10x ea        bridging  3 sec x 20 20x 3sec  20x 3sec  20x        SAQ:B:  3 sec x 20 20x 2#  20x 2#  20x ea, 2#        clamshells  Bend knee fall outs x 20 on each le 20x 3sec 20x 3sec with TB RTB  20x ea, RTB        Prone hip ext  2 x 10 20x 2#  20x 2# 20x, 2#        Prone flex    20x 2#  20x 2# 20x, 2#        sidestepping   NT  NT          Anterior step ups   NT  NT          3-way hip kick NT           Mini-squats   20x  20x  20x        HR   NT           SAQ     20x 2#  As above                         Modalities              Ice PRN

## 2019-09-12 ENCOUNTER — OFFICE VISIT (OUTPATIENT)
Dept: PHYSICAL THERAPY | Age: 84
End: 2019-09-12
Payer: MEDICARE

## 2019-09-12 DIAGNOSIS — M17.11 ARTHRITIS OF KNEE, RIGHT: ICD-10-CM

## 2019-09-12 DIAGNOSIS — M25.561 ACUTE PAIN OF RIGHT KNEE: Primary | ICD-10-CM

## 2019-09-12 PROCEDURE — 97112 NEUROMUSCULAR REEDUCATION: CPT

## 2019-09-12 PROCEDURE — 97110 THERAPEUTIC EXERCISES: CPT

## 2019-09-16 ENCOUNTER — OFFICE VISIT (OUTPATIENT)
Dept: PHYSICAL THERAPY | Age: 84
End: 2019-09-16
Payer: MEDICARE

## 2019-09-16 DIAGNOSIS — M25.561 ACUTE PAIN OF RIGHT KNEE: Primary | ICD-10-CM

## 2019-09-16 DIAGNOSIS — M17.11 ARTHRITIS OF KNEE, RIGHT: ICD-10-CM

## 2019-09-16 PROCEDURE — 97112 NEUROMUSCULAR REEDUCATION: CPT

## 2019-09-16 PROCEDURE — 97110 THERAPEUTIC EXERCISES: CPT

## 2019-09-16 NOTE — PROGRESS NOTES
Daily Note     Today's date: 2019  Patient name: Altagracia Haile  : 1935  MRN: [id number]  Referring provider: Rebecca Jordan DO  Dx:   Encounter Diagnosis     ICD-10-CM    1  Acute pain of right knee M25 561    2  Arthritis of knee, right M17 11                   Subjective: "My R tight and R knee are sore today"      Objective: See treatment diary below      Assessment: Decreased R LE pain after PT session, challenged with stairs and prolonged standing       Plan: Continue per plan of care        Precautions: Patient's PMHx is remarkable for GERD without esophagitis, Aortic stenosis, mild, DJD (degenerative joint disease), multiple sites, Osteoporosis, Psoriasis, Glaucoma, and HTN      Manual              Knee PROM                                                                     Exercise Diary  8/26 8/30 9/3 9/5 9/9 9/11 9/12 9/16     Quad sets w/ ankle prop 20x5" HEP  20x 5sec          Heel slides with strap 10x10" HEP  NT                        bike  8 min 10 min  10 min 10'  10 min  10 min      gastroc stretch with strap:B:  20 sec x 5 20sec 5x  5x 20 sec  5x20" 10 min  20sec 5x      HS stretch with strap:B:  20 sec x 5 20sec 5x  20sec 5x  5x20"  20sec 5x  20sec     Prone Quad stretch with strap  Add to HEP 20sec 5x     20sec 5x  20sec 5x      Hip iso add B    20 3sec  20x 3sec  np  20x 5sec 20x 5sec      SLR:B:  2 x 10 20x  20x  2#, 10x ea  20x 2#  20x 2 5#      bridging  3 sec x 20 20x 3sec  20x 3sec  20x  20x 3sec  20x 3sec      SAQ:B:  3 sec x 20 20x 2#  20x 2#  20x ea, 2#  20x 2#  20x 3#      clamshells  Bend knee fall outs x 20 on each le 20x 3sec 20x 3sec with TB RTB  20x ea, RTB  20x 3sec  BTB  20x 3sec BTB      Prone hip ext  2 x 10 20x 2#  20x 2# 20x, 2#  20x 2#  20x 2 5#      Prone flex    20x 2#  20x 2# 20x, 2#  20x 2#  20x 2 5#      sidestepping   NT  NT    NT  10x 2 5#      Anterior step ups   NT  NT    NT  NT      3-way hip kick   NT     20x 2#  20x 2 5#      Mini-squats 20x  20x  20x  20x  20x      HR   NT     30x  30x                                       Modalities  9/16            Ice PRN to R knee and R tight  10 min

## 2019-09-19 ENCOUNTER — OFFICE VISIT (OUTPATIENT)
Dept: PHYSICAL THERAPY | Age: 84
End: 2019-09-19
Payer: MEDICARE

## 2019-09-19 DIAGNOSIS — M17.11 ARTHRITIS OF KNEE, RIGHT: ICD-10-CM

## 2019-09-19 DIAGNOSIS — M25.561 ACUTE PAIN OF RIGHT KNEE: Primary | ICD-10-CM

## 2019-09-19 PROCEDURE — 97110 THERAPEUTIC EXERCISES: CPT

## 2019-09-19 PROCEDURE — 97140 MANUAL THERAPY 1/> REGIONS: CPT

## 2019-09-19 PROCEDURE — 97112 NEUROMUSCULAR REEDUCATION: CPT

## 2019-09-19 NOTE — PROGRESS NOTES
Daily Note     Today's date: 2019  Patient name: Mynor Ferrera  : 1935  MRN: [id number]  Referring provider: Jose Lau DO  Dx:   Encounter Diagnosis     ICD-10-CM    1  Acute pain of right knee M25 561    2  Arthritis of knee, right M17 11                   Subjective: "the lump behind my knee is feeling a bit better"       Objective: See treatment diary below      Assessment: Tolerated Graston trial well  Decreased pain after today's session with decreased ankle weights  Plan: Continue per plan of care        Precautions: Patient's PMHx is remarkable for GERD without esophagitis, Aortic stenosis, mild, DJD (degenerative joint disease), multiple sites, Osteoporosis, Psoriasis, Glaucoma, and HTN      Manual             Knee PROM                          Graston to R post knee  10 min                                          Exercise Diary  8/26 8/30 9/3 9/5 9/9 9/11 9/12 9/16 9/19    Quad sets w/ ankle prop 20x5" HEP  20x 5sec          Heel slides with strap 10x10" HEP  NT                        bike  8 min 10 min  10 min 10'  10 min  10 min  10 min                   HS stretch with strap:B:  20 sec x 5 20sec 5x  20sec 5x  5x20"  20sec 5x  20sec 20sec 5x     Prone Quad stretch with strap  Add to HEP 20sec 5x     20sec 5x  20sec 5x  NT     Hip iso add B    20 3sec  20x 3sec  np  20x 5sec 20x 5sec  NT     SLR:B:  2 x 10 20x  20x  2#, 10x ea  20x 2#  20x 2 5#  20x     bridging  3 sec x 20 20x 3sec  20x 3sec  20x  20x 3sec  20x 3sec  20x 3sec     SAQ:B:  3 sec x 20 20x 2#  20x 2#  20x ea, 2#  20x 2#  20x 3#  20x 3#     clamshells  Bend knee fall outs x 20 on each le 20x 3sec 20x 3sec with TB RTB  20x ea, RTB  20x 3sec  BTB  20x 3sec BTB  20x sec     Prone hip ext  2 x 10 20x 2#  20x 2# 20x, 2#  20x 2#  20x 2 5#  NT     Prone flex    20x 2#  20x 2# 20x, 2#  20x 2#  20x 2 5#  20x     sidestepping   NT  NT    NT  10x 2 5#  NT     Anterior step ups   NT  NT    NT  NT  NT     3-way hip kick NT     20x 2#  20x 2 5#  20x 2#     Mini-squats   20x  20x  20x  20x  20x  20x 2#     HR   NT     30x  30x  30x                                      Modalities  9/16            Ice PRN to R knee and R tight  10 min

## 2019-09-23 ENCOUNTER — OFFICE VISIT (OUTPATIENT)
Dept: PHYSICAL THERAPY | Age: 84
End: 2019-09-23
Payer: MEDICARE

## 2019-09-23 DIAGNOSIS — M25.561 ACUTE PAIN OF RIGHT KNEE: Primary | ICD-10-CM

## 2019-09-23 DIAGNOSIS — M17.11 ARTHRITIS OF KNEE, RIGHT: ICD-10-CM

## 2019-09-23 PROCEDURE — 97112 NEUROMUSCULAR REEDUCATION: CPT

## 2019-09-23 PROCEDURE — 97110 THERAPEUTIC EXERCISES: CPT

## 2019-09-23 PROCEDURE — 97140 MANUAL THERAPY 1/> REGIONS: CPT

## 2019-09-23 NOTE — PROGRESS NOTES
Daily Note     Today's date: 2019  Patient name: Kadeem Nixon  : 1935  MRN: [id number]  Referring provider: Nick Melo DO  Dx:   Encounter Diagnosis     ICD-10-CM    1  Acute pain of right knee M25 561    2  Arthritis of knee, right M17 11                   Subjective: "the lump is in back of my kne is almost gone, it doesn't hurt as much at all"       Objective: See treatment diary below      Assessment: Decreased pain at end range flexion at R LE       Plan: Continue per plan of care        Precautions: Patient's PMHx is remarkable for GERD without esophagitis, Aortic stenosis, mild, DJD (degenerative joint disease), multiple sites, Osteoporosis, Psoriasis, Glaucoma, and HTN      Manual            Knee PROM                          Graston to R post knee  10 min  10 min                                         Exercise Diary  8/26 8/30 9/3 9/5 9/9 9/11 9/12 9/16 9/19 9/23   Quad sets w/ ankle prop 20x5" HEP  20x 5sec          Heel slides with strap 10x10" HEP  NT                        bike  8 min 10 min  10 min 10'  10 min  10 min  10 min  10 min                  HS stretch with strap:B:  20 sec x 5 20sec 5x  20sec 5x  5x20"  20sec 5x  20sec 20sec 5x  20sec 5x    Prone Quad stretch with strap  Add to HEP 20sec 5x     20sec 5x  20sec 5x  NT  20sec 5x    Hip iso add B    20 3sec  20x 3sec  np  20x 5sec 20x 5sec  NT  20x 3sec    SLR:B:  2 x 10 20x  20x  2#, 10x ea  20x 2#  20x 2 5#  20x  20x 2 5#    bridging  3 sec x 20 20x 3sec  20x 3sec  20x  20x 3sec  20x 3sec  20x 3sec  20x 5sec    SAQ:B:  3 sec x 20 20x 2#  20x 2#  20x ea, 2#  20x 2#  20x 3#  20x 3#  20x 5sec 2 5#    clamshells  Bend knee fall outs x 20 on each le 20x 3sec 20x 3sec with TB RTB  20x ea, RTB  20x 3sec  BTB  20x 3sec BTB  20x sec  20x 3sec    Prone hip ext  2 x 10 20x 2#  20x 2# 20x, 2#  20x 2#  20x 2 5#  NT  20x 2 5#    Prone flex    20x 2#  20x 2# 20x, 2#  20x 2#  20x 2 5#  20x  20x 2 5#    sidestepping   NT  NT NT  10x 2 5#  NT  6x 2 5#    Anterior step ups   NT  NT    NT  NT  NT  20x 2 5#    3-way hip kick   NT     20x 2#  20x 2 5#  20x 2#  20x 2 5#    Mini-squats   20x  20x  20x  20x  20x  20x 2#  20x 2 5#    HR   NT     30x  30x  30x  30x                                     Modalities  9/16 9/23           Ice PRN to R knee and R tight  10 min 10 min to post R knee prone

## 2019-09-27 ENCOUNTER — OFFICE VISIT (OUTPATIENT)
Dept: PHYSICAL THERAPY | Age: 84
End: 2019-09-27
Payer: MEDICARE

## 2019-09-27 DIAGNOSIS — M17.11 ARTHRITIS OF KNEE, RIGHT: ICD-10-CM

## 2019-09-27 DIAGNOSIS — M25.561 ACUTE PAIN OF RIGHT KNEE: Primary | ICD-10-CM

## 2019-09-27 PROCEDURE — 97110 THERAPEUTIC EXERCISES: CPT | Performed by: PHYSICAL THERAPIST

## 2019-09-27 NOTE — PROGRESS NOTES
Daily Note     Today's date: 2019  Patient name: Jewell Blackman  : 1935  MRN: [id number]  Referring provider: Aneta Hurst DO  Dx:   Encounter Diagnosis     ICD-10-CM    1  Acute pain of right knee M25 561    2  Arthritis of knee, right M17 11                   Subjective: Patient reported intermittent right knee pain persists at minimal levels  Objective: See treatment diary below  Assessment: Patient presents with out right knee pain aggravation, thus patient instructed on PT reassessment at next visit to determine PT POC since overall functional and impairments improved  Plan: Continue per plan of care        Precautions: Patient's PMHx is remarkable for GERD without esophagitis, Aortic stenosis, mild, DJD (degenerative joint disease), multiple sites, Osteoporosis, Psoriasis, Glaucoma, and HTN      Manual           Knee PROM                          Graston to R post knee  10 min  10 min  NT                                       Exercise Diary  9/27 8/30 9/3 9/5 9/9 9/11 9/12 9/16 9/19 9/23   Quad sets w/ ankle prop NT  20x 5sec          Heel slides with strap NT  NT                        bike 10 min 8 min 10 min  10 min 10'  10 min  10 min  10 min  10 min                  HS stretch with strap:B: 20 sec x 5 20 sec x 5 20sec 5x  20sec 5x  5x20"  20sec 5x  20sec 20sec 5x  20sec 5x    Prone Quad stretch with strap NT Add to HEP 20sec 5x     20sec 5x  20sec 5x  NT  20sec 5x    Hip iso add B  NT  20 3sec  20x 3sec  np  20x 5sec 20x 5sec  NT  20x 3sec    SLR:B: 2 5#x20 2 x 10 20x  20x  2#, 10x ea  20x 2#  20x 2 5#  20x  20x 2 5#    bridging 2 x 10 3 sec x 20 20x 3sec  20x 3sec  20x  20x 3sec  20x 3sec  20x 3sec  20x 5sec    SAQ:B: 2 5#x20 3 sec x 20 20x 2#  20x 2#  20x ea, 2#  20x 2#  20x 3#  20x 3#  20x 5sec 2 5#    clamshells NT Bend knee fall outs x 20 on each le 20x 3sec 20x 3sec with TB RTB  20x ea, RTB  20x 3sec  BTB  20x 3sec BTB  20x sec  20x 3sec    Prone hip ext NT 2 x 10 20x 2#  20x 2# 20x, 2#  20x 2#  20x 2 5#  NT  20x 2 5#    Prone flex  NT  20x 2#  20x 2# 20x, 2#  20x 2#  20x 2 5#  20x  20x 2 5#    sidestepping 6 x  NT  NT    NT  10x 2 5#  NT  6x 2 5#    Anterior step ups 2 5# 2 x 10  NT  NT    NT  NT  NT  20x 2 5#    3-way hip kick 2 5#x20  NT     20x 2#  20x 2 5#  20x 2#  20x 2 5#    Mini-squats 2 x 10  20x  20x  20x  20x  20x  20x 2#  20x 2 5#    HR 2  x10  NT     30x  30x  30x  30x     lunges:B:               Step ups:B: forward 6" x 20         6"    Step ups lateral: B: 6" x 20         6"                                 Modalities  9/16 9/23 9/27          Ice PRN to R knee and R tight  10 min 10 min to post R knee prone  NT

## 2019-09-30 ENCOUNTER — EVALUATION (OUTPATIENT)
Dept: PHYSICAL THERAPY | Age: 84
End: 2019-09-30
Payer: MEDICARE

## 2019-09-30 ENCOUNTER — TRANSCRIBE ORDERS (OUTPATIENT)
Dept: PHYSICAL THERAPY | Age: 84
End: 2019-09-30

## 2019-09-30 DIAGNOSIS — M25.561 RIGHT KNEE PAIN, UNSPECIFIED CHRONICITY: Primary | ICD-10-CM

## 2019-09-30 DIAGNOSIS — M17.11 ARTHRITIS OF KNEE, RIGHT: ICD-10-CM

## 2019-09-30 DIAGNOSIS — M25.561 ACUTE PAIN OF RIGHT KNEE: Primary | ICD-10-CM

## 2019-09-30 PROCEDURE — 97110 THERAPEUTIC EXERCISES: CPT | Performed by: PHYSICAL THERAPIST

## 2019-09-30 NOTE — PROGRESS NOTES
PT Evaluation / PT Reassessment    Today's date: 2019  Patient name: Luz Pang  : 1935  MRN: [id number]  Referring provider: Hamlet Ahumada DO  Dx:   Encounter Diagnosis     ICD-10-CM    1  Acute pain of right knee M25 561    2  Arthritis of knee, right M17 11                   Assessment  Assessment details: Patient reported the following progress since onset of PT: decrease in right knee pain, increase in right le mobility, increase in right le strength, gait and standing activities have all improved  Patient noted she still has intermittent right knee pain which limits prolonged standing and ambulation based functional activities  Thus, pt agrees with PT POC to continue one more week then d/c to hep  Prognosis details: Patient presents with the following progress since onset of PT: decrease in right knee pain, increase in right le rom and strength, gait and stair progress and functional progress  Thus, due to functional and impairment progress patient agrees with PT POC to arrive to one more PT treatment session and then d/c to hep  Luz Pang is a 80 y o  female who was referred to physical therapy for management of right knee pain  Primary impairments include asymmetrical range of motion, decreased hip strength, and gait abnormality  Consequently, patient has difficulty completing ADLs including tub transfers and ambulation  Nicolas Leoncio would benefit from skilled intervention to address all deficits and improve functional capability  Patient is a good candidate for therapy, pending compliance with HEP and 2x weekly participation  Thank you for the referral and please do not hesitate to contact me with any questions or concerns regarding Doris care! Plan  Frequency:2x week   Duration in visits: 12  Duration in weeks: 6   Therapeutic exercise/activity, neuromuscular reeducation, manual therapy, and modalities     Patient understands and agrees to plan of care     Goals  Short Term--4 weeks  1  Patient will report 2 point decrease in pain symptoms  MET  2  1/2 point increase in MMT scores  MET  3  No pain with knee special testing  MET  Long Term--By Discharge  1  Patient will achieve expected FOTO score  MET  2  Patient will perform tub transfers without exacerbation of knee pain  MET  3  Patient will demonstrate 10 sec SLS, bilat  Partially MET  Patient's Goal: Decrease pain  Subjective  History   Date of Onset: Approximately 2 weeks ago Description: Insidious, immediate onset of inferior knee pain that she described as "weakness" and as if her knee would "give way "  However, she denies any actual episodes of locking/buckling  This past weekend pain moved posteriorly and she felt as if she could not bear any weight through her RLE  She managed pain with use of Volatarin gel and ice  Patient followed up with specialist, who attributed pain to OA pathology  Patient has had positive response to physical therapy in the past so she requested to trial therapy first   Patient reports full resolution of posterior pain this morning  She only complains of "weakness" sensation just posterior to patella that is worse with first couple of steps when she has been sitting/immobile for a while  Symptoms resolve once she's been moving for a couple of minutes  Pain is also aggravated with stepping into her tub  She denies any associated numbness or paresthesia  Pain at best: 0/10  Pain at worst: 3/10    Social History  Edilberto Ortiz lives indpendently in an apartment with a flight tof steps to get to the main level and a second flight of steps to get to her treadmill  Patient is not employed  Objective    GAIT: antalgic, RLE with decreased L step length  Squat assess: Patient reports mild discomfort > parallel  SLS: RLE: 5 sec, LLE: 5 sec           MMT    Hip         R          L   Flex  5 5   Extn  4+ 4+   Abd  4 4   Add  5 5   IR  4+ 4+   ER  4 4                 MMT         AROM         PROM   Knee      R          L         R          L           R   Flex  4+ 4+ 120 125 125   Extn   4+ 4+ +5 (hyper) -2 (hypo)      Palpation: (-) TTP R medial joint line      Knee: post drawer = neg anterior draw test= neg   Valgus stress test= pos R varus stress test = neg   Suleiman test = neg    Patella mobility test= normal   Thessaly= neg      Flexibility (R/L):   Hamstrings= mod/mod tightness   Quadriceps/HF=  Severe/Severe tightness       Precautions: Patient's PMHx is remarkable for GERD without esophagitis, Aortic stenosis, mild, DJD (degenerative joint disease), multiple sites, Osteoporosis, Psoriasis, Glaucoma, and HTN      Manual  8/26 9/19 9/23 9/27 9/30        Knee PROM                          Graston to R post knee  10 min  10 min  NT NT                                      Exercise Diary  9/27 9/30 9/3 9/5 9/9 9/11 9/12 9/16 9/19 9/23   Quad sets w/ ankle prop NT NT 20x 5sec          Heel slides with strap NT NT NT                        bike 10 min 10 min 10 min  10 min 10'  10 min  10 min  10 min  10 min                  HS stretch with strap:B: 20 sec x 5 20 sec x 5 20sec 5x  20sec 5x  5x20"  20sec 5x  20sec 20sec 5x  20sec 5x    Prone Quad stretch with strap NT Add to HEP 20sec 5x     20sec 5x  20sec 5x  NT  20sec 5x    Hip iso add B  NT NT 20 3sec  20x 3sec  np  20x 5sec 20x 5sec  NT  20x 3sec    SLR:B: 2 5#x20 NT 20x  20x  2#, 10x ea  20x 2#  20x 2 5#  20x  20x 2 5#    bridging 2 x 10 3 sec x 20 20x 3sec  20x 3sec  20x  20x 3sec  20x 3sec  20x 3sec  20x 5sec    SAQ:B: 2 5#x20 NT 20x 2#  20x 2#  20x ea, 2#  20x 2#  20x 3#  20x 3#  20x 5sec 2 5#    clamshells NT NT 20x 3sec 20x 3sec with TB RTB  20x ea, RTB  20x 3sec  BTB  20x 3sec BTB  20x sec  20x 3sec    Prone hip ext NT NT 20x 2#  20x 2# 20x, 2#  20x 2#  20x 2 5#  NT  20x 2 5#    Prone flex  NT NT 20x 2#  20x 2# 20x, 2#  20x 2#  20x 2 5#  20x  20x 2 5#    sidestepping 6 x 6 x NT  NT    NT  10x 2 5# NT  6x 2 5#    Anterior step ups 2 5# 2 x 10 6" x 20 NT  NT    NT  NT  NT  20x 2 5#    3-way hip kick 2 5#x20 2 5#x20 NT     20x 2#  20x 2 5#  20x 2#  20x 2 5#    Mini-squats 2 x 10 2 x 10 20x  20x  20x  20x  20x  20x 2#  20x 2 5#    HR 2  x10 2 x 10 NT     30x  30x  30x  30x     lunges:B:    2 x 10           Step ups:B: forward 6" x 20 6" x 20        6"    Step ups lateral: B: 6" x 20 NT        6"                                 Modalities  9/16 9/23 9/27 9/30         Ice PRN to R knee and R tight  10 min 10 min to post R knee prone  NT NT

## 2019-09-30 NOTE — LETTER
2019    Mehnaz Grant   [address] [address] [facility]    Patient: Savi Teresa   YOB: 1935   Date of Visit: 2019     Encounter Diagnosis     ICD-10-CM    1  Acute pain of right knee M25 561    2  Arthritis of knee, right M17 11        Dear Dr Jaz Bauer:    Thank you for your recent referral of Savi Teresa  Please review the attached evaluation summary from Dori's recent visit  Please verify that you agree with the plan of care by signing the attached order  If you have any questions or concerns, please do not hesitate to call  I sincerely appreciate the opportunity to share in the care of one of your patients and hope to have another opportunity to work with you in the near future  Sincerely,    Murphy Matthews, PT      Referring Provider:      I certify that I have read the below Plan of Care and certify the need for these services furnished under this plan of treatment while under my care  Mehnaz Grant   [address] [address]: [phone]          PT Evaluation / PT Reassessment    Today's date: 2019  Patient name: Savi Teresa  : 1935  MRN: [id number]  Referring provider: Lori Brennan DO  Dx:   Encounter Diagnosis     ICD-10-CM    1  Acute pain of right knee M25 561    2  Arthritis of knee, right M17 11                   Assessment  Assessment details: Patient reported the following progress since onset of PT: decrease in right knee pain, increase in right le mobility, increase in right le strength, gait and standing activities have all improved  Patient noted she still has intermittent right knee pain which limits prolonged standing and ambulation based functional activities  Thus, pt agrees with PT POC to continue one more week then d/c to hep      Prognosis details: Patient presents with the following progress since onset of PT: decrease in right knee pain, increase in right le rom and strength, gait and stair progress and functional progress  Thus, due to functional and impairment progress patient agrees with PT POC to arrive to one more PT treatment session and then d/c to hep  Aren Sanchez is a 80 y o  female who was referred to physical therapy for management of right knee pain  Primary impairments include asymmetrical range of motion, decreased hip strength, and gait abnormality  Consequently, patient has difficulty completing ADLs including tub transfers and ambulation  Carlee Castillo would benefit from skilled intervention to address all deficits and improve functional capability  Patient is a good candidate for therapy, pending compliance with HEP and 2x weekly participation  Thank you for the referral and please do not hesitate to contact me with any questions or concerns regarding Doris care! Plan  Frequency:2x week   Duration in visits: 12  Duration in weeks: 6   Therapeutic exercise/activity, neuromuscular reeducation, manual therapy, and modalities  Patient understands and agrees to plan of care  Goals  Short Term--4 weeks  1  Patient will report 2 point decrease in pain symptoms  MET  2  1/2 point increase in MMT scores  MET  3  No pain with knee special testing  MET  Long Term--By Discharge  1  Patient will achieve expected FOTO score  MET  2  Patient will perform tub transfers without exacerbation of knee pain  MET  3  Patient will demonstrate 10 sec SLS, bilat  Partially MET  Patient's Goal: Decrease pain  Subjective  History   Date of Onset: Approximately 2 weeks ago Description: Insidious, immediate onset of inferior knee pain that she described as "weakness" and as if her knee would "give way "  However, she denies any actual episodes of locking/buckling  This past weekend pain moved posteriorly and she felt as if she could not bear any weight through her RLE    She managed pain with use of Volatarin gel and ice  Patient followed up with specialist, who attributed pain to OA pathology  Patient has had positive response to physical therapy in the past so she requested to trial therapy first   Patient reports full resolution of posterior pain this morning  She only complains of "weakness" sensation just posterior to patella that is worse with first couple of steps when she has been sitting/immobile for a while  Symptoms resolve once she's been moving for a couple of minutes  Pain is also aggravated with stepping into her tub  She denies any associated numbness or paresthesia  Pain at best: 0/10  Pain at worst: 3/10    Social History  Andrea Song lives indpendently in an apartment with a flight tof steps to get to the main level and a second flight of steps to get to her treadmill  Patient is not employed  Objective    GAIT: antalgic, RLE with decreased L step length  Squat assess: Patient reports mild discomfort > parallel  SLS: RLE: 5 sec, LLE: 5 sec           MMT    Hip         R          L   Flex  5 5   Extn  4+ 4+   Abd  4 4   Add  5 5   IR  4+ 4+   ER  4 4                 MMT         AROM         PROM   Knee      R          L         R          L           R   Flex  4+ 4+ 120 125 125   Extn   4+ 4+ +5 (hyper) -2 (hypo)      Palpation: (-) TTP R medial joint line      Knee: post drawer = neg anterior draw test= neg   Valgus stress test= pos R varus stress test = neg   Suleiman test = neg    Patella mobility test= normal   Thessaly= neg      Flexibility (R/L):   Hamstrings= mod/mod tightness   Quadriceps/HF=  Severe/Severe tightness       Precautions: Patient's PMHx is remarkable for GERD without esophagitis, Aortic stenosis, mild, DJD (degenerative joint disease), multiple sites, Osteoporosis, Psoriasis, Glaucoma, and HTN      Manual  8/26 9/19 9/23 9/27 9/30        Knee PROM                          Graston to R post knee  10 min  10 min  NT NT                                      Exercise Diary  9/27 9/30 9/3 9/5 9/9 9/11 9/12 9/16 9/19 9/23   Quad sets w/ ankle prop NT NT 20x 5sec          Heel slides with strap NT NT NT                        bike 10 min 10 min 10 min  10 min 10'  10 min  10 min  10 min  10 min                  HS stretch with strap:B: 20 sec x 5 20 sec x 5 20sec 5x  20sec 5x  5x20"  20sec 5x  20sec 20sec 5x  20sec 5x    Prone Quad stretch with strap NT Add to HEP 20sec 5x     20sec 5x  20sec 5x  NT  20sec 5x    Hip iso add B  NT NT 20 3sec  20x 3sec  np  20x 5sec 20x 5sec  NT  20x 3sec    SLR:B: 2 5#x20 NT 20x  20x  2#, 10x ea  20x 2#  20x 2 5#  20x  20x 2 5#    bridging 2 x 10 3 sec x 20 20x 3sec  20x 3sec  20x  20x 3sec  20x 3sec  20x 3sec  20x 5sec    SAQ:B: 2 5#x20 NT 20x 2#  20x 2#  20x ea, 2#  20x 2#  20x 3#  20x 3#  20x 5sec 2 5#    clamshells NT NT 20x 3sec 20x 3sec with TB RTB  20x ea, RTB  20x 3sec  BTB  20x 3sec BTB  20x sec  20x 3sec    Prone hip ext NT NT 20x 2#  20x 2# 20x, 2#  20x 2#  20x 2 5#  NT  20x 2 5#    Prone flex  NT NT 20x 2#  20x 2# 20x, 2#  20x 2#  20x 2 5#  20x  20x 2 5#    sidestepping 6 x 6 x NT  NT    NT  10x 2 5#  NT  6x 2 5#    Anterior step ups 2 5# 2 x 10 6" x 20 NT  NT    NT  NT  NT  20x 2 5#    3-way hip kick 2 5#x20 2 5#x20 NT     20x 2#  20x 2 5#  20x 2#  20x 2 5#    Mini-squats 2 x 10 2 x 10 20x  20x  20x  20x  20x  20x 2#  20x 2 5#    HR 2  x10 2 x 10 NT     30x  30x  30x  30x     lunges:B:    2 x 10           Step ups:B: forward 6" x 20 6" x 20        6"    Step ups lateral: B: 6" x 20 NT        6"                                 Modalities  9/16 9/23 9/27 9/30         Ice PRN to R knee and R tight  10 min 10 min to post R knee prone  NT NT

## 2019-10-04 ENCOUNTER — APPOINTMENT (OUTPATIENT)
Dept: PHYSICAL THERAPY | Age: 84
End: 2019-10-04
Payer: MEDICARE

## 2019-10-04 ENCOUNTER — OFFICE VISIT (OUTPATIENT)
Dept: PHYSICAL THERAPY | Age: 84
End: 2019-10-04
Payer: MEDICARE

## 2019-10-04 DIAGNOSIS — M17.11 ARTHRITIS OF KNEE, RIGHT: ICD-10-CM

## 2019-10-04 DIAGNOSIS — M25.561 ACUTE PAIN OF RIGHT KNEE: Primary | ICD-10-CM

## 2019-10-04 PROCEDURE — 97110 THERAPEUTIC EXERCISES: CPT | Performed by: PHYSICAL THERAPIST

## 2019-10-04 NOTE — PROGRESS NOTES
Daily Note     Today's date: 10/4/2019  Patient name: Samira Andino  : 1935  MRN: [id number]  Referring provider: Jacob Vergara DO  Dx:   Encounter Diagnosis     ICD-10-CM    1  Acute pain of right knee M25 561    2  Arthritis of knee, right M17 11                   Subjective: Patient reported intermittent right knee pain persists at minimal levels but she noted right > left le fatigue persists and limits prolonged standing based functional activities  Objective: See treatment diary below  Assessment: Patient presents with out right knee pain aggravation during hep instruction thus patient agrees with PT POC to d/c to hep  Plan: Continue per plan of care        Precautions: Patient's PMHx is remarkable for GERD without esophagitis, Aortic stenosis, mild, DJD (degenerative joint disease), multiple sites, Osteoporosis, Psoriasis, Glaucoma, and HTN      Manual           Knee PROM                          Graston to R post knee  10 min  10 min  NT                                       Exercise Diary  9/27 10/04 9/3 9/5 9/9 9/11 9/12 9/16 9/19 9/23   Quad sets w/ ankle prop NT  20x 5sec          Heel slides with strap NT  NT                        bike 10 min 10 min 10 min  10 min 10'  10 min  10 min  10 min  10 min                  HS stretch with strap:B: 20 sec x 5 20 sec x 5 20sec 5x  20sec 5x  5x20"  20sec 5x  20sec 20sec 5x  20sec 5x    Prone Quad stretch with strap NT Add to HEP 20sec 5x     20sec 5x  20sec 5x  NT  20sec 5x    Hip iso add B  NT  20 3sec  20x 3sec  np  20x 5sec 20x 5sec  NT  20x 3sec    SLR:B: 2 5#x20 2 x 10 20x  20x  2#, 10x ea  20x 2#  20x 2 5#  20x  20x 2 5#    bridging 2 x 10 3 sec x 20 20x 3sec  20x 3sec  20x  20x 3sec  20x 3sec  20x 3sec  20x 5sec    SAQ:B: 2 5#x20 3 sec x 20 20x 2#  20x 2#  20x ea, 2#  20x 2#  20x 3#  20x 3#  20x 5sec 2 5#    clamshells NT Bend knee fall outs x 20 on each le 20x 3sec 20x 3sec with TB RTB  20x ea, RTB  20x 3sec BTB  20x 3sec BTB  20x sec  20x 3sec    Prone hip ext NT 2 x 10 20x 2#  20x 2# 20x, 2#  20x 2#  20x 2 5#  NT  20x 2 5#    Prone flex  NT  20x 2#  20x 2# 20x, 2#  20x 2#  20x 2 5#  20x  20x 2 5#    sidestepping 6 x  NT  NT    NT  10x 2 5#  NT  6x 2 5#    Anterior step ups 2 5# 2 x 10  NT  NT    NT  NT  NT  20x 2 5#    3-way hip kick 2 5#x20 3# x 20 NT     20x 2#  20x 2 5#  20x 2#  20x 2 5#    Mini-squats 2 x 10 2 x 10 20x  20x  20x  20x  20x  20x 2#  20x 2 5#    HR & TR 2  x10 2 x 10 NT     30x  30x  30x  30x     lunges:B:    2 x 10           Step ups:B: forward 6" x 20 6" x 20        6"    Step ups lateral: B: 6" x 20 6" x 20        6"                                 Modalities  9/16 9/23 9/27          Ice PRN to R knee and R tight  10 min 10 min to post R knee prone  NT

## 2019-10-23 ENCOUNTER — APPOINTMENT (OUTPATIENT)
Dept: LAB | Age: 84
End: 2019-10-23
Payer: MEDICARE

## 2019-10-23 DIAGNOSIS — R73.01 IMPAIRED FASTING GLUCOSE: ICD-10-CM

## 2019-10-23 DIAGNOSIS — E78.2 MIXED HYPERLIPIDEMIA: ICD-10-CM

## 2019-10-23 LAB
ALBUMIN SERPL BCP-MCNC: 4 G/DL (ref 3.5–5)
ALP SERPL-CCNC: 70 U/L (ref 46–116)
ALT SERPL W P-5'-P-CCNC: 30 U/L (ref 12–78)
ANION GAP SERPL CALCULATED.3IONS-SCNC: 6 MMOL/L (ref 4–13)
AST SERPL W P-5'-P-CCNC: 20 U/L (ref 5–45)
BASOPHILS # BLD AUTO: 0.04 THOUSANDS/ΜL (ref 0–0.1)
BASOPHILS NFR BLD AUTO: 1 % (ref 0–1)
BILIRUB SERPL-MCNC: 0.48 MG/DL (ref 0.2–1)
BUN SERPL-MCNC: 14 MG/DL (ref 5–25)
CALCIUM SERPL-MCNC: 9.4 MG/DL (ref 8.3–10.1)
CHLORIDE SERPL-SCNC: 106 MMOL/L (ref 100–108)
CHOLEST SERPL-MCNC: 204 MG/DL (ref 50–200)
CO2 SERPL-SCNC: 29 MMOL/L (ref 21–32)
CREAT SERPL-MCNC: 0.78 MG/DL (ref 0.6–1.3)
EOSINOPHIL # BLD AUTO: 0.12 THOUSAND/ΜL (ref 0–0.61)
EOSINOPHIL NFR BLD AUTO: 3 % (ref 0–6)
ERYTHROCYTE [DISTWIDTH] IN BLOOD BY AUTOMATED COUNT: 13.8 % (ref 11.6–15.1)
EST. AVERAGE GLUCOSE BLD GHB EST-MCNC: 126 MG/DL
GFR SERPL CREATININE-BSD FRML MDRD: 81 ML/MIN/1.73SQ M
GLUCOSE P FAST SERPL-MCNC: 98 MG/DL (ref 65–99)
HBA1C MFR BLD: 6 % (ref 4.2–6.3)
HCT VFR BLD AUTO: 40.3 % (ref 34.8–46.1)
HDLC SERPL-MCNC: 113 MG/DL
HGB BLD-MCNC: 12.7 G/DL (ref 11.5–15.4)
IMM GRANULOCYTES # BLD AUTO: 0.01 THOUSAND/UL (ref 0–0.2)
IMM GRANULOCYTES NFR BLD AUTO: 0 % (ref 0–2)
LDLC SERPL CALC-MCNC: 75 MG/DL (ref 0–100)
LYMPHOCYTES # BLD AUTO: 1.84 THOUSANDS/ΜL (ref 0.6–4.47)
LYMPHOCYTES NFR BLD AUTO: 39 % (ref 14–44)
MCH RBC QN AUTO: 31.4 PG (ref 26.8–34.3)
MCHC RBC AUTO-ENTMCNC: 31.5 G/DL (ref 31.4–37.4)
MCV RBC AUTO: 100 FL (ref 82–98)
MONOCYTES # BLD AUTO: 0.53 THOUSAND/ΜL (ref 0.17–1.22)
MONOCYTES NFR BLD AUTO: 11 % (ref 4–12)
NEUTROPHILS # BLD AUTO: 2.18 THOUSANDS/ΜL (ref 1.85–7.62)
NEUTS SEG NFR BLD AUTO: 46 % (ref 43–75)
NONHDLC SERPL-MCNC: 91 MG/DL
NRBC BLD AUTO-RTO: 0 /100 WBCS
PLATELET # BLD AUTO: 210 THOUSANDS/UL (ref 149–390)
PMV BLD AUTO: 10.7 FL (ref 8.9–12.7)
POTASSIUM SERPL-SCNC: 3.8 MMOL/L (ref 3.5–5.3)
PROT SERPL-MCNC: 7.5 G/DL (ref 6.4–8.2)
RBC # BLD AUTO: 4.05 MILLION/UL (ref 3.81–5.12)
SODIUM SERPL-SCNC: 141 MMOL/L (ref 136–145)
TRIGL SERPL-MCNC: 78 MG/DL
WBC # BLD AUTO: 4.72 THOUSAND/UL (ref 4.31–10.16)

## 2019-10-23 PROCEDURE — 85025 COMPLETE CBC W/AUTO DIFF WBC: CPT

## 2019-10-23 PROCEDURE — 83036 HEMOGLOBIN GLYCOSYLATED A1C: CPT

## 2019-10-23 PROCEDURE — 80061 LIPID PANEL: CPT

## 2019-10-23 PROCEDURE — 80053 COMPREHEN METABOLIC PANEL: CPT

## 2019-10-23 PROCEDURE — 36415 COLL VENOUS BLD VENIPUNCTURE: CPT

## 2019-11-05 ENCOUNTER — OFFICE VISIT (OUTPATIENT)
Dept: INTERNAL MEDICINE CLINIC | Facility: CLINIC | Age: 84
End: 2019-11-05
Payer: MEDICARE

## 2019-11-05 VITALS
RESPIRATION RATE: 16 BRPM | SYSTOLIC BLOOD PRESSURE: 128 MMHG | WEIGHT: 174 LBS | HEART RATE: 80 BPM | BODY MASS INDEX: 30.83 KG/M2 | OXYGEN SATURATION: 98 % | DIASTOLIC BLOOD PRESSURE: 78 MMHG | HEIGHT: 63 IN

## 2019-11-05 DIAGNOSIS — Z23 NEED FOR PNEUMOCOCCAL VACCINATION: ICD-10-CM

## 2019-11-05 DIAGNOSIS — K21.9 GERD WITHOUT ESOPHAGITIS: ICD-10-CM

## 2019-11-05 DIAGNOSIS — R73.01 IMPAIRED FASTING GLUCOSE: Primary | ICD-10-CM

## 2019-11-05 DIAGNOSIS — M81.0 AGE-RELATED OSTEOPOROSIS WITHOUT CURRENT PATHOLOGICAL FRACTURE: ICD-10-CM

## 2019-11-05 DIAGNOSIS — M15.9 PRIMARY OSTEOARTHRITIS INVOLVING MULTIPLE JOINTS: ICD-10-CM

## 2019-11-05 DIAGNOSIS — L40.9 PSORIASIS: ICD-10-CM

## 2019-11-05 DIAGNOSIS — E78.2 MIXED HYPERLIPIDEMIA: ICD-10-CM

## 2019-11-05 DIAGNOSIS — Z23 NEED FOR INFLUENZA VACCINATION: ICD-10-CM

## 2019-11-05 PROCEDURE — 90732 PPSV23 VACC 2 YRS+ SUBQ/IM: CPT | Performed by: INTERNAL MEDICINE

## 2019-11-05 PROCEDURE — 90662 IIV NO PRSV INCREASED AG IM: CPT | Performed by: INTERNAL MEDICINE

## 2019-11-05 PROCEDURE — G0008 ADMIN INFLUENZA VIRUS VAC: HCPCS | Performed by: INTERNAL MEDICINE

## 2019-11-05 PROCEDURE — 99214 OFFICE O/P EST MOD 30 MIN: CPT | Performed by: INTERNAL MEDICINE

## 2019-11-05 PROCEDURE — G0009 ADMIN PNEUMOCOCCAL VACCINE: HCPCS | Performed by: INTERNAL MEDICINE

## 2019-11-05 PROCEDURE — 96372 THER/PROPH/DIAG INJ SC/IM: CPT | Performed by: INTERNAL MEDICINE

## 2019-11-05 RX ORDER — CELECOXIB 100 MG/1
100 CAPSULE ORAL 2 TIMES DAILY
Qty: 180 CAPSULE | Refills: 3 | Status: SHIPPED | OUTPATIENT
Start: 2019-11-05 | End: 2020-07-01

## 2019-11-05 RX ORDER — OMEPRAZOLE 20 MG/1
20 CAPSULE, DELAYED RELEASE ORAL DAILY
Qty: 90 CAPSULE | Refills: 1 | Status: SHIPPED | OUTPATIENT
Start: 2019-11-05 | End: 2020-07-01 | Stop reason: SDUPTHER

## 2019-11-05 RX ORDER — ROSUVASTATIN CALCIUM 10 MG/1
10 TABLET, COATED ORAL DAILY
Qty: 90 TABLET | Refills: 1 | Status: SHIPPED | OUTPATIENT
Start: 2019-11-05 | End: 2020-07-01 | Stop reason: SDUPTHER

## 2019-11-05 NOTE — PROGRESS NOTES
Assessment/Plan:     Chronic problems are stable  Continue present medications  Continue diet and exercise  Ordered labs for next visit  Quality Measures:       Return in about 6 months (around 5/5/2020) for Regular visit and Medicare Wellness  No problem-specific Assessment & Plan notes found for this encounter  Diagnoses and all orders for this visit:    Impaired fasting glucose  -     Comprehensive metabolic panel; Future  -     CBC and differential; Future  -     Hemoglobin A1C; Future    Mixed hyperlipidemia  -     Lipid panel; Future  -     rosuvastatin (CRESTOR) 10 MG tablet; Take 1 tablet (10 mg total) by mouth daily    GERD without esophagitis  -     omeprazole (PriLOSEC) 20 mg delayed release capsule; Take 1 capsule (20 mg total) by mouth daily    Primary osteoarthritis involving multiple joints  -     celecoxib (CeleBREX) 100 mg capsule; Take 1 capsule (100 mg total) by mouth 2 (two) times a day    Psoriasis    Need for influenza vaccination  -     influenza vaccine, [id number], high-dose, PF 0 5 mL (FLUZONE HIGH-DOSE)    Need for pneumococcal vaccination  -     PNEUMOCOCCAL POLYSACCHARIDE VACCINE 23-VALENT =>1YO SQ IM    Age-related osteoporosis without current pathological fracture  -     denosumab (PROLIA) subcutaneous injection 60 mg          Subjective:      Patient ID: Annabelle Bailon is a 80 y o  female  Patient comes in today for routine follow-up  She states she is doing well with no new complaints  Her blood work shows continued control of her sugar and cholesterol  Blood pressure is controlled  She is taking her medicine as directed  Arthritis is manageable  Her psoriasis is stable  She continues to watch her diet  Reflux has not been acting up  She reports no new complaints  No further additions to her history        ALLERGIES:  No Known Allergies    CURRENT MEDICATIONS:    Current Outpatient Medications:     celecoxib (CeleBREX) 100 mg capsule, Take 1 capsule (100 mg total) by mouth 2 (two) times a day, Disp: 180 capsule, Rfl: 3    HALOG 0 1 % CREA, , Disp: , Rfl:     omeprazole (PriLOSEC) 20 mg delayed release capsule, Take 1 capsule (20 mg total) by mouth daily, Disp: 90 capsule, Rfl: 1    ranibizumab (LUCENTIS) 0 5 mg/0 05mL, 0 5 mg once, Disp: , Rfl:     rosuvastatin (CRESTOR) 10 MG tablet, Take 1 tablet (10 mg total) by mouth daily, Disp: 90 tablet, Rfl: 1    SORILUX 0 005 % topical foam, , Disp: , Rfl:     TRAVATAN Z 0 004 % ophthalmic solution, , Disp: , Rfl:     VOLTAREN 1 %, , Disp: , Rfl:   No current facility-administered medications for this visit  ACTIVE PROBLEM LIST:  Patient Active Problem List   Diagnosis    Aortic stenosis, mild    DJD (degenerative joint disease), multiple sites    GERD without esophagitis    Glaucoma    Mixed hyperlipidemia    Impaired fasting glucose    Osteoporosis    Psoriasis    Precordial pain       PAST MEDICAL HISTORY:  Past Medical History:   Diagnosis Date    Hypertension     Osteoporosis        PAST SURGICAL HISTORY:  History reviewed  No pertinent surgical history  FAMILY HISTORY:  Family History   Problem Relation Age of Onset    Hypertension Father        SOCIAL HISTORY:  Social History     Socioeconomic History    Marital status:      Spouse name: Not on file    Number of children: Not on file    Years of education: Not on file    Highest education level: Not on file   Occupational History    Not on file   Social Needs    Financial resource strain: Not on file    Food insecurity:     Worry: Not on file     Inability: Not on file    Transportation needs:     Medical: Not on file     Non-medical: Not on file   Tobacco Use    Smoking status: Former Smoker     Packs/day: 0 50     Types: Cigarettes    Smokeless tobacco: Former User    Tobacco comment: Patient reports she smoked for 20 years   Substance and Sexual Activity    Alcohol use:  Yes    Drug use: No    Sexual activity: Never   Lifestyle    Physical activity:     Days per week: Not on file     Minutes per session: Not on file    Stress: Not on file   Relationships    Social connections:     Talks on phone: Not on file     Gets together: Not on file     Attends Caodaism service: Not on file     Active member of club or organization: Not on file     Attends meetings of clubs or organizations: Not on file     Relationship status: Not on file    Intimate partner violence:     Fear of current or ex partner: Not on file     Emotionally abused: Not on file     Physically abused: Not on file     Forced sexual activity: Not on file   Other Topics Concern    Not on file   Social History Narrative    Not on file       Review of Systems   Respiratory: Negative for shortness of breath  Cardiovascular: Negative for chest pain  Gastrointestinal: Negative for abdominal pain  Objective:  Vitals:    11/05/19 1357   BP: 128/78   BP Location: Left arm   Patient Position: Sitting   Cuff Size: Standard   Pulse: 80   Resp: 16   SpO2: 98%   Weight: 78 9 kg (174 lb)   Height: 5' 3" (1 6 m)     Body mass index is 30 82 kg/m²  Physical Exam   Constitutional: She is oriented to person, place, and time  She appears well-developed and well-nourished  Cardiovascular: Normal rate, regular rhythm and normal heart sounds  Pulmonary/Chest: Effort normal and breath sounds normal    Abdominal: Soft  There is no tenderness  Neurological: She is alert and oriented to person, place, and time  Nursing note and vitals reviewed          RESULTS:    Recent Results (from the past 1008 hour(s))   Comprehensive metabolic panel    Collection Time: 10/23/19  8:58 AM   Result Value Ref Range    Sodium 141 136 - 145 mmol/L    Potassium 3 8 3 5 - 5 3 mmol/L    Chloride 106 100 - 108 mmol/L    CO2 29 21 - 32 mmol/L    ANION GAP 6 4 - 13 mmol/L    BUN 14 5 - 25 mg/dL    Creatinine 0 78 0 60 - 1 30 mg/dL    Glucose, Fasting 98 65 - 99 mg/dL    Calcium 9 4 8 3 - 10 1 mg/dL    AST 20 5 - 45 U/L    ALT 30 12 - 78 U/L    Alkaline Phosphatase 70 46 - 116 U/L    Total Protein 7 5 6 4 - 8 2 g/dL    Albumin 4 0 3 5 - 5 0 g/dL    Total Bilirubin 0 48 0 20 - 1 00 mg/dL    eGFR 81 ml/min/1 73sq m   CBC and differential    Collection Time: 10/23/19  8:58 AM   Result Value Ref Range    WBC 4 72 4 31 - 10 16 Thousand/uL    RBC 4 05 3 81 - 5 12 Million/uL    Hemoglobin 12 7 11 5 - 15 4 g/dL    Hematocrit 40 3 34 8 - 46 1 %     (H) 82 - 98 fL    MCH 31 4 26 8 - 34 3 pg    MCHC 31 5 31 4 - 37 4 g/dL    RDW 13 8 11 6 - 15 1 %    MPV 10 7 8 9 - 12 7 fL    Platelets 274 357 - 870 Thousands/uL    nRBC 0 /100 WBCs    Neutrophils Relative 46 43 - 75 %    Immat GRANS % 0 0 - 2 %    Lymphocytes Relative 39 14 - 44 %    Monocytes Relative 11 4 - 12 %    Eosinophils Relative 3 0 - 6 %    Basophils Relative 1 0 - 1 %    Neutrophils Absolute 2 18 1 85 - 7 62 Thousands/µL    Immature Grans Absolute 0 01 0 00 - 0 20 Thousand/uL    Lymphocytes Absolute 1 84 0 60 - 4 47 Thousands/µL    Monocytes Absolute 0 53 0 17 - 1 22 Thousand/µL    Eosinophils Absolute 0 12 0 00 - 0 61 Thousand/µL    Basophils Absolute 0 04 0 00 - 0 10 Thousands/µL   Hemoglobin A1C    Collection Time: 10/23/19  8:58 AM   Result Value Ref Range    Hemoglobin A1C 6 0 4 2 - 6 3 %     mg/dl   Lipid panel    Collection Time: 10/23/19  8:58 AM   Result Value Ref Range    Cholesterol 204 (H) 50 - 200 mg/dL    Triglycerides 78 <=150 mg/dL    HDL, Direct 113 >=40 mg/dL    LDL Calculated 75 0 - 100 mg/dL    Non-HDL-Chol (CHOL-HDL) 91 mg/dl       This note was created with voice recognition software  Phonic, grammatical and spelling errors may be present within the note as a result

## 2020-02-27 ENCOUNTER — TELEPHONE (OUTPATIENT)
Dept: INTERNAL MEDICINE CLINIC | Facility: CLINIC | Age: 85
End: 2020-02-27

## 2020-02-27 NOTE — TELEPHONE ENCOUNTER
Patient has had hoarseness for a 2-3 days  She has tried Mucinex and [place] and that only helped with her cough  She would like to know what you would recommend for this? She does not have a ride to come in for an appt  Please advise        Call back #[phone]

## 2020-02-28 NOTE — TELEPHONE ENCOUNTER
Could add Flonase nasal spray or similar  Frequently the hoarseness and cough is from postnasal drip so the nasal spray helps with that

## 2020-05-18 ENCOUNTER — APPOINTMENT (OUTPATIENT)
Dept: LAB | Age: 85
End: 2020-05-18
Payer: MEDICARE

## 2020-05-18 DIAGNOSIS — R73.01 IMPAIRED FASTING GLUCOSE: ICD-10-CM

## 2020-05-18 DIAGNOSIS — E78.2 MIXED HYPERLIPIDEMIA: ICD-10-CM

## 2020-05-18 LAB
ALBUMIN SERPL BCP-MCNC: 4.1 G/DL (ref 3.5–5)
ALP SERPL-CCNC: 64 U/L (ref 46–116)
ALT SERPL W P-5'-P-CCNC: 20 U/L (ref 12–78)
ANION GAP SERPL CALCULATED.3IONS-SCNC: 3 MMOL/L (ref 4–13)
AST SERPL W P-5'-P-CCNC: 16 U/L (ref 5–45)
BASOPHILS # BLD AUTO: 0.04 THOUSANDS/ΜL (ref 0–0.1)
BASOPHILS NFR BLD AUTO: 1 % (ref 0–1)
BILIRUB SERPL-MCNC: 0.52 MG/DL (ref 0.2–1)
BUN SERPL-MCNC: 17 MG/DL (ref 5–25)
CALCIUM SERPL-MCNC: 8.7 MG/DL (ref 8.3–10.1)
CHLORIDE SERPL-SCNC: 104 MMOL/L (ref 100–108)
CHOLEST SERPL-MCNC: 208 MG/DL (ref 50–200)
CO2 SERPL-SCNC: 31 MMOL/L (ref 21–32)
CREAT SERPL-MCNC: 0.8 MG/DL (ref 0.6–1.3)
EOSINOPHIL # BLD AUTO: 0.17 THOUSAND/ΜL (ref 0–0.61)
EOSINOPHIL NFR BLD AUTO: 3 % (ref 0–6)
ERYTHROCYTE [DISTWIDTH] IN BLOOD BY AUTOMATED COUNT: 13.3 % (ref 11.6–15.1)
EST. AVERAGE GLUCOSE BLD GHB EST-MCNC: 131 MG/DL
GFR SERPL CREATININE-BSD FRML MDRD: 78 ML/MIN/1.73SQ M
GLUCOSE P FAST SERPL-MCNC: 102 MG/DL (ref 65–99)
HBA1C MFR BLD: 6.2 %
HCT VFR BLD AUTO: 39.1 % (ref 34.8–46.1)
HDLC SERPL-MCNC: 108 MG/DL
HGB BLD-MCNC: 12.1 G/DL (ref 11.5–15.4)
IMM GRANULOCYTES # BLD AUTO: 0.01 THOUSAND/UL (ref 0–0.2)
IMM GRANULOCYTES NFR BLD AUTO: 0 % (ref 0–2)
LDLC SERPL CALC-MCNC: 81 MG/DL (ref 0–100)
LYMPHOCYTES # BLD AUTO: 2.02 THOUSANDS/ΜL (ref 0.6–4.47)
LYMPHOCYTES NFR BLD AUTO: 40 % (ref 14–44)
MCH RBC QN AUTO: 30.8 PG (ref 26.8–34.3)
MCHC RBC AUTO-ENTMCNC: 30.9 G/DL (ref 31.4–37.4)
MCV RBC AUTO: 100 FL (ref 82–98)
MONOCYTES # BLD AUTO: 0.56 THOUSAND/ΜL (ref 0.17–1.22)
MONOCYTES NFR BLD AUTO: 11 % (ref 4–12)
NEUTROPHILS # BLD AUTO: 2.21 THOUSANDS/ΜL (ref 1.85–7.62)
NEUTS SEG NFR BLD AUTO: 45 % (ref 43–75)
NONHDLC SERPL-MCNC: 100 MG/DL
NRBC BLD AUTO-RTO: 0 /100 WBCS
PLATELET # BLD AUTO: 208 THOUSANDS/UL (ref 149–390)
PMV BLD AUTO: 10.4 FL (ref 8.9–12.7)
POTASSIUM SERPL-SCNC: 3.8 MMOL/L (ref 3.5–5.3)
PROT SERPL-MCNC: 7.4 G/DL (ref 6.4–8.2)
RBC # BLD AUTO: 3.93 MILLION/UL (ref 3.81–5.12)
SODIUM SERPL-SCNC: 138 MMOL/L (ref 136–145)
TRIGL SERPL-MCNC: 94 MG/DL
WBC # BLD AUTO: 5.01 THOUSAND/UL (ref 4.31–10.16)

## 2020-05-18 PROCEDURE — 36415 COLL VENOUS BLD VENIPUNCTURE: CPT

## 2020-05-18 PROCEDURE — 80053 COMPREHEN METABOLIC PANEL: CPT

## 2020-05-18 PROCEDURE — 85025 COMPLETE CBC W/AUTO DIFF WBC: CPT

## 2020-05-18 PROCEDURE — 83036 HEMOGLOBIN GLYCOSYLATED A1C: CPT

## 2020-05-18 PROCEDURE — 80061 LIPID PANEL: CPT

## 2020-05-22 ENCOUNTER — TELEPHONE (OUTPATIENT)
Dept: INTERNAL MEDICINE CLINIC | Facility: CLINIC | Age: 85
End: 2020-05-22

## 2020-06-23 ENCOUNTER — TRANSCRIBE ORDERS (OUTPATIENT)
Dept: ADMINISTRATIVE | Facility: HOSPITAL | Age: 85
End: 2020-06-23

## 2020-06-23 DIAGNOSIS — D33.2 BENIGN NEOPLASM OF BRAIN, UNSPECIFIED BRAIN REGION (HCC): ICD-10-CM

## 2020-06-23 DIAGNOSIS — H53.131 SUDDEN VISUAL LOSS OF RIGHT EYE: Primary | ICD-10-CM

## 2020-07-01 ENCOUNTER — OFFICE VISIT (OUTPATIENT)
Dept: INTERNAL MEDICINE CLINIC | Facility: CLINIC | Age: 85
End: 2020-07-01
Payer: MEDICARE

## 2020-07-01 VITALS
SYSTOLIC BLOOD PRESSURE: 124 MMHG | TEMPERATURE: 98.3 F | HEIGHT: 63 IN | OXYGEN SATURATION: 96 % | DIASTOLIC BLOOD PRESSURE: 70 MMHG | WEIGHT: 168 LBS | BODY MASS INDEX: 29.77 KG/M2 | RESPIRATION RATE: 16 BRPM | HEART RATE: 74 BPM

## 2020-07-01 DIAGNOSIS — K21.9 GERD WITHOUT ESOPHAGITIS: ICD-10-CM

## 2020-07-01 DIAGNOSIS — E78.2 MIXED HYPERLIPIDEMIA: ICD-10-CM

## 2020-07-01 DIAGNOSIS — M81.0 AGE-RELATED OSTEOPOROSIS WITHOUT CURRENT PATHOLOGICAL FRACTURE: ICD-10-CM

## 2020-07-01 DIAGNOSIS — R73.01 IMPAIRED FASTING GLUCOSE: ICD-10-CM

## 2020-07-01 DIAGNOSIS — Z00.00 MEDICARE ANNUAL WELLNESS VISIT, SUBSEQUENT: Primary | ICD-10-CM

## 2020-07-01 DIAGNOSIS — M15.9 PRIMARY OSTEOARTHRITIS INVOLVING MULTIPLE JOINTS: ICD-10-CM

## 2020-07-01 PROCEDURE — 1036F TOBACCO NON-USER: CPT | Performed by: INTERNAL MEDICINE

## 2020-07-01 PROCEDURE — 1170F FXNL STATUS ASSESSED: CPT | Performed by: INTERNAL MEDICINE

## 2020-07-01 PROCEDURE — 96372 THER/PROPH/DIAG INJ SC/IM: CPT | Performed by: INTERNAL MEDICINE

## 2020-07-01 PROCEDURE — 1160F RVW MEDS BY RX/DR IN RCRD: CPT | Performed by: INTERNAL MEDICINE

## 2020-07-01 PROCEDURE — 3008F BODY MASS INDEX DOCD: CPT | Performed by: INTERNAL MEDICINE

## 2020-07-01 PROCEDURE — 1125F AMNT PAIN NOTED PAIN PRSNT: CPT | Performed by: INTERNAL MEDICINE

## 2020-07-01 PROCEDURE — 4040F PNEUMOC VAC/ADMIN/RCVD: CPT | Performed by: INTERNAL MEDICINE

## 2020-07-01 PROCEDURE — 99214 OFFICE O/P EST MOD 30 MIN: CPT | Performed by: INTERNAL MEDICINE

## 2020-07-01 PROCEDURE — G0439 PPPS, SUBSEQ VISIT: HCPCS | Performed by: INTERNAL MEDICINE

## 2020-07-01 PROCEDURE — 1123F ACP DISCUSS/DSCN MKR DOCD: CPT | Performed by: INTERNAL MEDICINE

## 2020-07-01 RX ORDER — OMEPRAZOLE 20 MG/1
20 CAPSULE, DELAYED RELEASE ORAL DAILY
Qty: 90 CAPSULE | Refills: 1 | Status: SHIPPED | OUTPATIENT
Start: 2020-07-01 | End: 2021-01-21 | Stop reason: SDUPTHER

## 2020-07-01 RX ORDER — ROSUVASTATIN CALCIUM 10 MG/1
10 TABLET, COATED ORAL DAILY
Qty: 90 TABLET | Refills: 1 | Status: SHIPPED | OUTPATIENT
Start: 2020-07-01 | End: 2021-10-11

## 2020-07-01 NOTE — PROGRESS NOTES
Assessment and Plan:     Problem List Items Addressed This Visit        Digestive    GERD without esophagitis       Other    Mixed hyperlipidemia        BMI Counseling: Body mass index is 29 76 kg/m²  The BMI is above normal  Nutrition recommendations include encouraging healthy choices of fruits and vegetables  Preventive health issues were discussed with patient, and age appropriate screening tests were ordered as noted in patient's After Visit Summary  Personalized health advice and appropriate referrals for health education or preventive services given if needed, as noted in patient's After Visit Summary  History of Present Illness:     Patient presents for [facility] to Medicare visit  Patient Care Team:  Ale Waite MD as PCP - General     Review of Systems:     Review of Systems   Respiratory: Negative for shortness of breath  Cardiovascular: Negative for chest pain  Gastrointestinal: Negative for abdominal pain  Problem List:     Patient Active Problem List   Diagnosis    Aortic stenosis, mild    DJD (degenerative joint disease), multiple sites    GERD without esophagitis    Glaucoma    Mixed hyperlipidemia    Impaired fasting glucose    Osteoporosis    Psoriasis    Precordial pain      Past Medical and Surgical History:     Past Medical History:   Diagnosis Date    Hypertension     Osteoporosis      No past surgical history on file  Family History:     Family History   Problem Relation Age of Onset    Hypertension Father       Social History:        Social History     Socioeconomic History    Marital status:       Spouse name: None    Number of children: None    Years of education: None    Highest education level: None   Occupational History    None   Social Needs    Financial resource strain: None    Food insecurity:     Worry: None     Inability: None    Transportation needs:     Medical: None     Non-medical: None   Tobacco Use    Smoking status: Former Smoker     Packs/day: 0 50     Types: Cigarettes    Smokeless tobacco: Former User    Tobacco comment: Patient reports she smoked for 20 years   Substance and Sexual Activity    Alcohol use: Yes    Drug use: No    Sexual activity: Never   Lifestyle    Physical activity:     Days per week: None     Minutes per session: None    Stress: None   Relationships    Social connections:     Talks on phone: None     Gets together: None     Attends Sikhism service: None     Active member of club or organization: None     Attends meetings of clubs or organizations: None     Relationship status: None    Intimate partner violence:     Fear of current or ex partner: None     Emotionally abused: None     Physically abused: None     Forced sexual activity: None   Other Topics Concern    None   Social History Narrative    None      Medications and Allergies:     Current Outpatient Medications   Medication Sig Dispense Refill    HALOG 0 1 % CREA       omeprazole (PriLOSEC) 20 mg delayed release capsule Take 1 capsule (20 mg total) by mouth daily 90 capsule 1    ranibizumab (LUCENTIS) 0 5 mg/0 05mL 0 5 mg once      rosuvastatin (CRESTOR) 10 MG tablet Take 1 tablet (10 mg total) by mouth daily 90 tablet 1    SORILUX 0 005 % topical foam       TRAVATAN Z 0 004 % ophthalmic solution       VOLTAREN 1 %       celecoxib (CeleBREX) 100 mg capsule Take 1 capsule (100 mg total) by mouth 2 (two) times a day (Patient not taking: Reported on 7/1/2020) 180 capsule 3     No current facility-administered medications for this visit        No Known Allergies   Immunizations:     Immunization History   Administered Date(s) Administered    INFLUENZA 10/14/2015, 10/24/2017    Influenza, high dose seasonal 0 5 mL 10/16/2018, 11/05/2019    Pneumococcal Conjugate 13-Valent 08/26/2015    Pneumococcal Polysaccharide PPV23 11/05/2019      Health Maintenance:         Topic Date Due    CRC Screening: Colonoscopy  02/16/2019 Topic Date Due    DTaP,Tdap,and Td Vaccines (1 - Tdap) 09/26/1946    Influenza Vaccine  07/01/2020      Medicare Screening Tests and Risk Assessments:     Nikki Trujillo is here for her Subsequent Wellness visit  Health Risk Assessment:   Patient rates overall health as good  Patient feels that their physical health rating is same  Eyesight was rated as same  Hearing was rated as same  Patient feels that their emotional and mental health rating is same  Pain experienced in the last 7 days has been none  Patient states that she has experienced no weight loss or gain in last 6 months  Depression Screening:   PHQ-2 Score: 0      Fall Risk Screening: In the past year, patient has experienced: no history of falling in past year      Urinary Incontinence Screening:   Patient has not leaked urine accidently in the last six months  Home Safety:  Patient does not have trouble with stairs inside or outside of their home  Patient has working smoke alarms and has working carbon monoxide detector  Home safety hazards include: none  Nutrition:   Current diet is Regular  Medications:   Patient is currently taking over-the-counter supplements  OTC medications include: see medication list  Patient is able to manage medications  Activities of Daily Living (ADLs)/Instrumental Activities of Daily Living (IADLs):   Walk and transfer into and out of bed and chair?: Yes  Dress and groom yourself?: Yes    Bathe or shower yourself?: Yes    Feed yourself? Yes  Do your laundry/housekeeping?: Yes  Manage your money, pay your bills and track your expenses?: Yes  Make your own meals?: Yes    Do your own shopping?: Yes    Previous Hospitalizations:   Any hospitalizations or ED visits within the last 12 months?: No      Advance Care Planning:   Living will: Yes    Durable POA for healthcare:  Yes    Advanced directive: Yes    Advanced directive counseling given: Yes      Cognitive Screening:   Provider or family/friend/caregiver concerned regarding cognition?: No    PREVENTIVE SCREENINGS      Cardiovascular Screening:    General: Screening Not Indicated and History Lipid Disorder      Diabetes Screening:     General: Screening Current      Colorectal Cancer Screening:     General: Screening Not Indicated      Breast Cancer Screening:     General: Screening Not Indicated      Cervical Cancer Screening:    General: Screening Not Indicated      Osteoporosis Screening:    General: Screening Not Indicated and History Osteoporosis      Abdominal Aortic Aneurysm (AAA) Screening:        General: Screening Not Indicated      Lung Cancer Screening:     General: Screening Not Indicated      Hepatitis C Screening:    General: Screening Not Indicated    No exam data present     Physical Exam:     Pulse 74   Temp 98 3 °F (36 8 °C) (Tympanic)   Resp 16   Ht 5' 3" (1 6 m)   Wt 76 2 kg (168 lb)   BMI 29 76 kg/m²     Physical Exam   Constitutional: She is oriented to person, place, and time  She appears well-developed and well-nourished  No distress  Pulmonary/Chest: No respiratory distress  Neurological: She is alert and oriented to person, place, and time  Psychiatric: She has a normal mood and affect  Nursing note and vitals reviewed        Clari Griffith MD

## 2020-07-01 NOTE — PROGRESS NOTES
Assessment/Plan:     Chronic problems appear stable  Watch the diet  Continue current medications  Ordered labs for next visit  Quality Measures:       Return in about 6 months (around 1/1/2021)  No problem-specific Assessment & Plan notes found for this encounter  Diagnoses and all orders for this visit:    Medicare annual wellness visit, subsequent    GERD without esophagitis  -     omeprazole (PriLOSEC) 20 mg delayed release capsule; Take 1 capsule (20 mg total) by mouth daily    Mixed hyperlipidemia  -     rosuvastatin (CRESTOR) 10 MG tablet; Take 1 tablet (10 mg total) by mouth daily  -     Comprehensive metabolic panel; Future  -     CBC and differential; Future  -     Lipid panel; Future    Impaired fasting glucose  -     Hemoglobin A1C; Future    Primary osteoarthritis involving multiple joints  -     VOLTAREN 1 %; Apply 2 g topically 4 (four) times a day    Age-related osteoporosis without current pathological fracture  -     denosumab (PROLIA) subcutaneous injection 60 mg          Subjective:      Patient ID: Sunita Sutton is a 80 y o  female  Patient comes in today for routine follow-up and Medicare wellness  She states she has been staying at home during the pandemic  States she is doing okay  Her labs show continued control of her sugar and cholesterol  She is taking her medicines as directed  Reflux is under control  Arthritis is about the same  She started taking a magnesium supplement and that has helped the leg cramps at night  She reports no new complaints today  No further additions to her history        ALLERGIES:  No Known Allergies    CURRENT MEDICATIONS:    Current Outpatient Medications:     HALOG 0 1 % CREA, , Disp: , Rfl:     omeprazole (PriLOSEC) 20 mg delayed release capsule, Take 1 capsule (20 mg total) by mouth daily, Disp: 90 capsule, Rfl: 1    ranibizumab (LUCENTIS) 0 5 mg/0 05mL, 0 5 mg once, Disp: , Rfl:     rosuvastatin (CRESTOR) 10 MG tablet, Take 1 tablet (10 mg total) by mouth daily, Disp: 90 tablet, Rfl: 1    SORILUX 0 005 % topical foam, , Disp: , Rfl:     TRAVATAN Z 0 004 % ophthalmic solution, , Disp: , Rfl:     VOLTAREN 1 %, Apply 2 g topically 4 (four) times a day, Disp: 300 g, Rfl: 3  No current facility-administered medications for this visit  ACTIVE PROBLEM LIST:  Patient Active Problem List   Diagnosis    Aortic stenosis, mild    DJD (degenerative joint disease), multiple sites    GERD without esophagitis    Glaucoma    Mixed hyperlipidemia    Impaired fasting glucose    Osteoporosis    Psoriasis    Precordial pain       PAST MEDICAL HISTORY:  Past Medical History:   Diagnosis Date    Hypertension     Osteoporosis        PAST SURGICAL HISTORY:  History reviewed  No pertinent surgical history  FAMILY HISTORY:  Family History   Problem Relation Age of Onset    Hypertension Father        SOCIAL HISTORY:  Social History     Socioeconomic History    Marital status:      Spouse name: Not on file    Number of children: Not on file    Years of education: Not on file    Highest education level: Not on file   Occupational History    Not on file   Social Needs    Financial resource strain: Not on file    Food insecurity:     Worry: Not on file     Inability: Not on file    Transportation needs:     Medical: Not on file     Non-medical: Not on file   Tobacco Use    Smoking status: Former Smoker     Packs/day: 0 50     Types: Cigarettes    Smokeless tobacco: Former User    Tobacco comment: Patient reports she smoked for 20 years   Substance and Sexual Activity    Alcohol use:  Yes    Drug use: No    Sexual activity: Never   Lifestyle    Physical activity:     Days per week: Not on file     Minutes per session: Not on file    Stress: Not on file   Relationships    Social connections:     Talks on phone: Not on file     Gets together: Not on file     Attends Moravian service: Not on file     Active member of club or organization: Not on file     Attends meetings of clubs or organizations: Not on file     Relationship status: Not on file    Intimate partner violence:     Fear of current or ex partner: Not on file     Emotionally abused: Not on file     Physically abused: Not on file     Forced sexual activity: Not on file   Other Topics Concern    Not on file   Social History Narrative    Not on file       Review of Systems   Respiratory: Negative for shortness of breath  Cardiovascular: Negative for chest pain  Gastrointestinal: Negative for abdominal pain  Objective:  Vitals:    07/01/20 1208   BP: 124/70   BP Location: Left arm   Patient Position: Sitting   Cuff Size: Standard   Pulse: 74   Resp: 16   Temp: 98 3 °F (36 8 °C)   TempSrc: Tympanic   SpO2: 96%   Weight: 76 2 kg (168 lb)   Height: 5' 3" (1 6 m)     Body mass index is 29 76 kg/m²  Physical Exam   Constitutional: She is oriented to person, place, and time  She appears well-developed and well-nourished  Cardiovascular: Normal rate, regular rhythm and normal heart sounds  Pulmonary/Chest: Effort normal and breath sounds normal    Abdominal: Soft  There is no tenderness  Neurological: She is alert and oriented to person, place, and time  Nursing note and vitals reviewed  RESULTS:    No results found for this or any previous visit (from the past 1008 hour(s))  This note was created with voice recognition software  Phonic, grammatical and spelling errors may be present within the note as a result

## 2020-07-01 NOTE — PATIENT INSTRUCTIONS
Medicare Preventive Visit Patient Instructions  Thank you for completing your Welcome to Medicare Visit or Medicare Annual Wellness Visit today  Your next wellness visit will be due in one year (7/1/2021)  The screening/preventive services that you may require over the next 5-10 years are detailed below  Some tests may not apply to you based off risk factors and/or age  Screening tests ordered at today's visit but not completed yet may show as past due  Also, please note that scanned in results may not display below  Preventive Screenings:  Service Recommendations Previous Testing/Comments   Colorectal Cancer Screening  * Colonoscopy    * Fecal Occult Blood Test (FOBT)/Fecal Immunochemical Test (FIT)  * Fecal DNA/Cologuard Test  * Flexible Sigmoidoscopy Age: 54-65 years old   Colonoscopy: every 10 years (may be performed more frequently if at higher risk)  OR  FOBT/FIT: every 1 year  OR  Cologuard: every 3 years  OR  Sigmoidoscopy: every 5 years  Screening may be recommended earlier than age 48 if at higher risk for colorectal cancer  Also, an individualized decision between you and your healthcare provider will decide whether screening between the ages of 74-80 would be appropriate  Colonoscopy: 02/16/2016  FOBT/FIT: Not on file  Cologuard: Not on file  Sigmoidoscopy: Not on file         Breast Cancer Screening Age: 36 years old  Frequency: every 1-2 years  Not required if history of left and right mastectomy Mammogram: Not on file       Cervical Cancer Screening Between the ages of 21-29, pap smear recommended once every 3 years  Between the ages of 33-67, can perform pap smear with HPV co-testing every 5 years     Recommendations may differ for women with a history of total hysterectomy, cervical cancer, or abnormal pap smears in past  Pap Smear: Not on file    Screening Not Indicated   Hepatitis C Screening Once for adults born between 1945 and 1965  More frequently in patients at high risk for Hepatitis C Hep C Antibody: Not on file       Diabetes Screening 1-2 times per year if you're at risk for diabetes or have pre-diabetes Fasting glucose: 102 mg/dL   A1C: 6 2 %    Screening Current   Cholesterol Screening Once every 5 years if you don't have a lipid disorder  May order more often based on risk factors  Lipid panel: 05/18/2020    Screening Not Indicated  History Lipid Disorder     Other Preventive Screenings Covered by Medicare:  1  Abdominal Aortic Aneurysm (AAA) Screening: covered once if your at risk  You're considered to be at risk if you have a family history of AAA  2  Lung Cancer Screening: covers low dose CT scan once per year if you meet all of the following conditions: (1) Age 50-69; (2) No signs or symptoms of lung cancer; (3) Current smoker or have quit smoking within the last 15 years; (4) You have a tobacco smoking history of at least 30 pack years (packs per day multiplied by number of years you smoked); (5) You get a written order from a healthcare provider  3  Glaucoma Screening: covered annually if you're considered high risk: (1) You have diabetes OR (2) Family history of glaucoma OR (3)  aged 48 and older OR (3)  American aged 72 and older  3  Osteoporosis Screening: covered every 2 years if you meet one of the following conditions: (1) You're estrogen deficient and at risk for osteoporosis based off medical history and other findings; (2) Have a vertebral abnormality; (3) On glucocorticoid therapy for more than 3 months; (4) Have primary hyperparathyroidism; (5) On osteoporosis medications and need to assess response to drug therapy  · Last bone density test (DXA Scan): 07/02/2019   5  HIV Screening: covered annually if you're between the age of 15-65  Also covered annually if you are younger than 13 and older than 72 with risk factors for HIV infection  For pregnant patients, it is covered up to 3 times per pregnancy      Immunizations:  Immunization Recommendations   Influenza Vaccine Annual influenza vaccination during flu season is recommended for all persons aged >= 6 months who do not have contraindications   Pneumococcal Vaccine (Prevnar and Pneumovax)  * Prevnar = PCV13  * Pneumovax = PPSV23   Adults 25-60 years old: 1-3 doses may be recommended based on certain risk factors  Adults 72 years old: Prevnar (PCV13) vaccine recommended followed by Pneumovax (PPSV23) vaccine  If already received PPSV23 since turning 65, then PCV13 recommended at least one year after PPSV23 dose  Hepatitis B Vaccine 3 dose series if at intermediate or high risk (ex: diabetes, end stage renal disease, liver disease)   Tetanus (Td) Vaccine - COST NOT COVERED BY MEDICARE PART B Following completion of primary series, a booster dose should be given every 10 years to maintain immunity against tetanus  Td may also be given as tetanus wound prophylaxis  Tdap Vaccine - COST NOT COVERED BY MEDICARE PART B Recommended at least once for all adults  For pregnant patients, recommended with each pregnancy  Shingles Vaccine (Shingrix) - COST NOT COVERED BY MEDICARE PART B  2 shot series recommended in those aged 48 and above     Health Maintenance Due:      Topic Date Due    CRC Screening: Colonoscopy  02/16/2019     Immunizations Due:      Topic Date Due    DTaP,Tdap,and Td Vaccines (1 - Tdap) 09/26/1946    Influenza Vaccine  07/01/2020     Advance Directives   What are advance directives? Advance directives are legal documents that state your wishes and plans for medical care  These plans are made ahead of time in case you lose your ability to make decisions for yourself  Advance directives can apply to any medical decision, such as the treatments you want, and if you want to donate organs  What are the types of advance directives? There are many types of advance directives, and each state has rules about how to use them   You may choose a combination of any of the following:  · Living will: This is a written record of the treatment you want  You can also choose which treatments you do not want, which to limit, and which to stop at a certain time  This includes surgery, medicine, IV fluid, and tube feedings  · Durable power of  for healthcare [facility]): This is a written record that states who you want to make healthcare choices for you when you are unable to make them for yourself  This person, called a proxy, is usually a family member or a friend  You may choose more than 1 proxy  · Do not resuscitate (DNR) order:  A DNR order is used in case your heart stops beating or you stop breathing  It is a request not to have certain forms of treatment, such as CPR  A DNR order may be included in other types of advance directives  · Medical directive: This covers the care that you want if you are in a coma, near death, or unable to make decisions for yourself  You can list the treatments you want for each condition  Treatment may include pain medicine, surgery, blood transfusions, dialysis, IV or tube feedings, and a ventilator (breathing machine)  · Values history: This document has questions about your views, beliefs, and how you feel and think about life  This information can help others choose the care that you would choose  Why are advance directives important? An advance directive helps you control your care  Although spoken wishes may be used, it is better to have your wishes written down  Spoken wishes can be misunderstood, or not followed  Treatments may be given even if you do not want them  An advance directive may make it easier for your family to make difficult choices about your care  Weight Management   Why it is important to manage your weight:  Being overweight increases your risk of health conditions such as heart disease, high blood pressure, type 2 diabetes, and certain types of cancer   It can also increase your risk for osteoarthritis, sleep apnea, and other respiratory problems  Aim for a slow, steady weight loss  Even a small amount of weight loss can lower your risk of health problems  How to lose weight safely:  A safe and healthy way to lose weight is to eat fewer calories and get regular exercise  You can lose up about 1 pound a week by decreasing the number of calories you eat by 500 calories each day  Healthy meal plan for weight management:  A healthy meal plan includes a variety of foods, contains fewer calories, and helps you stay healthy  A healthy meal plan includes the following:  · Eat whole-grain foods more often  A healthy meal plan should contain fiber  Fiber is the part of grains, fruits, and vegetables that is not broken down by your body  Whole-grain foods are healthy and provide extra fiber in your diet  Some examples of whole-grain foods are whole-wheat breads and pastas, oatmeal, brown rice, and bulgur  · Eat a variety of vegetables every day  Include dark, leafy greens such as spinach, kale, rubi greens, and mustard greens  Eat yellow and orange vegetables such as carrots, sweet potatoes, and winter squash  · Eat a variety of fruits every day  Choose fresh or canned fruit (canned in its own juice or light syrup) instead of juice  Fruit juice has very little or no fiber  · Eat low-fat dairy foods  Drink fat-free (skim) milk or 1% milk  Eat fat-free yogurt and low-fat cottage cheese  Try low-fat cheeses such as mozzarella and other reduced-fat cheeses  · Choose meat and other protein foods that are low in fat  Choose beans or other legumes such as split peas or lentils  Choose fish, skinless poultry (chicken or turkey), or lean cuts of red meat (beef or pork)  Before you cook meat or poultry, cut off any visible fat  · Use less fat and oil  Try baking foods instead of frying them  Add less fat, such as margarine, sour cream, regular salad dressing and mayonnaise to foods  Eat fewer high-fat foods   Some examples of high-fat foods include french fries, doughnuts, ice cream, and cakes  · Eat fewer sweets  Limit foods and drinks that are high in sugar  This includes candy, cookies, regular soda, and sweetened drinks  Exercise:  Exercise at least 30 minutes per day on most days of the week  Some examples of exercise include walking, biking, dancing, and swimming  You can also fit in more physical activity by taking the stairs instead of the elevator or parking farther away from stores  Ask your healthcare provider about the best exercise plan for you  © Copyright [employer] 2018 Information is for End User's use only and may not be sold, redistributed or otherwise used for commercial purposes  All illustrations and images included in CareNotes® are the copyrighted property of [employer]  or [facility] Preventive Visit Patient Instructions  Thank you for completing your Welcome to Medicare Visit or Medicare Annual Wellness Visit today  Your next wellness visit will be due in one year (7/1/2021)  The screening/preventive services that you may require over the next 5-10 years are detailed below  Some tests may not apply to you based off risk factors and/or age  Screening tests ordered at today's visit but not completed yet may show as past due  Also, please note that scanned in results may not display below  Preventive Screenings:  Service Recommendations Previous Testing/Comments   Colorectal Cancer Screening  * Colonoscopy    * Fecal Occult Blood Test (FOBT)/Fecal Immunochemical Test (FIT)  * Fecal DNA/Cologuard Test  * Flexible Sigmoidoscopy Age: 54-65 years old   Colonoscopy: every 10 years (may be performed more frequently if at higher risk)  OR  FOBT/FIT: every 1 year  OR  Cologuard: every 3 years  OR  Sigmoidoscopy: every 5 years  Screening may be recommended earlier than age 48 if at higher risk for colorectal cancer   Also, an individualized decision between you and your healthcare provider will decide whether screening between the ages of 74-80 would be appropriate  Colonoscopy: 02/16/2016  FOBT/FIT: Not on file  Cologuard: Not on file  Sigmoidoscopy: Not on file         Breast Cancer Screening Age: 36 years old  Frequency: every 1-2 years  Not required if history of left and right mastectomy Mammogram: Not on file       Cervical Cancer Screening Between the ages of 21-29, pap smear recommended once every 3 years  Between the ages of 33-67, can perform pap smear with HPV co-testing every 5 years  Recommendations may differ for women with a history of total hysterectomy, cervical cancer, or abnormal pap smears in past  Pap Smear: Not on file    Screening Not Indicated   Hepatitis C Screening Once for adults born between 1945 and 1965  More frequently in patients at high risk for Hepatitis C Hep C Antibody: Not on file       Diabetes Screening 1-2 times per year if you're at risk for diabetes or have pre-diabetes Fasting glucose: 102 mg/dL   A1C: 6 2 %    Screening Current   Cholesterol Screening Once every 5 years if you don't have a lipid disorder  May order more often based on risk factors  Lipid panel: 05/18/2020    Screening Not Indicated  History Lipid Disorder     Other Preventive Screenings Covered by Medicare:  6  Abdominal Aortic Aneurysm (AAA) Screening: covered once if your at risk  You're considered to be at risk if you have a family history of AAA  7  Lung Cancer Screening: covers low dose CT scan once per year if you meet all of the following conditions: (1) Age 50-69; (2) No signs or symptoms of lung cancer; (3) Current smoker or have quit smoking within the last 15 years; (4) You have a tobacco smoking history of at least 30 pack years (packs per day multiplied by number of years you smoked); (5) You get a written order from a healthcare provider    8  Glaucoma Screening: covered annually if you're considered high risk: (1) You have diabetes OR (2) Family history of glaucoma OR (3)  aged 48 and older OR (3)  American aged 72 and older  5  Osteoporosis Screening: covered every 2 years if you meet one of the following conditions: (1) You're estrogen deficient and at risk for osteoporosis based off medical history and other findings; (2) Have a vertebral abnormality; (3) On glucocorticoid therapy for more than 3 months; (4) Have primary hyperparathyroidism; (5) On osteoporosis medications and need to assess response to drug therapy  · Last bone density test (DXA Scan): 07/02/2019   10  HIV Screening: covered annually if you're between the age of 15-65  Also covered annually if you are younger than 13 and older than 72 with risk factors for HIV infection  For pregnant patients, it is covered up to 3 times per pregnancy  Immunizations:  Immunization Recommendations   Influenza Vaccine Annual influenza vaccination during flu season is recommended for all persons aged >= 6 months who do not have contraindications   Pneumococcal Vaccine (Prevnar and Pneumovax)  * Prevnar = PCV13  * Pneumovax = PPSV23   Adults 25-60 years old: 1-3 doses may be recommended based on certain risk factors  Adults 72 years old: Prevnar (PCV13) vaccine recommended followed by Pneumovax (PPSV23) vaccine  If already received PPSV23 since turning 65, then PCV13 recommended at least one year after PPSV23 dose  Hepatitis B Vaccine 3 dose series if at intermediate or high risk (ex: diabetes, end stage renal disease, liver disease)   Tetanus (Td) Vaccine - COST NOT COVERED BY MEDICARE PART B Following completion of primary series, a booster dose should be given every 10 years to maintain immunity against tetanus  Td may also be given as tetanus wound prophylaxis  Tdap Vaccine - COST NOT COVERED BY MEDICARE PART B Recommended at least once for all adults  For pregnant patients, recommended with each pregnancy     Shingles Vaccine (Shingrix) - COST NOT COVERED BY MEDICARE PART B  2 shot series recommended in those aged 48 and above     Health Maintenance Due:      Topic Date Due    CRC Screening: Colonoscopy  02/16/2019     Immunizations Due:      Topic Date Due    DTaP,Tdap,and Td Vaccines (1 - Tdap) 09/26/1946    Influenza Vaccine  07/01/2020     Advance Directives   What are advance directives? Advance directives are legal documents that state your wishes and plans for medical care  These plans are made ahead of time in case you lose your ability to make decisions for yourself  Advance directives can apply to any medical decision, such as the treatments you want, and if you want to donate organs  What are the types of advance directives? There are many types of advance directives, and each state has rules about how to use them  You may choose a combination of any of the following:  · Living will: This is a written record of the treatment you want  You can also choose which treatments you do not want, which to limit, and which to stop at a certain time  This includes surgery, medicine, IV fluid, and tube feedings  · Durable power of  for healthcare [facility]): This is a written record that states who you want to make healthcare choices for you when you are unable to make them for yourself  This person, called a proxy, is usually a family member or a friend  You may choose more than 1 proxy  · Do not resuscitate (DNR) order:  A DNR order is used in case your heart stops beating or you stop breathing  It is a request not to have certain forms of treatment, such as CPR  A DNR order may be included in other types of advance directives  · Medical directive: This covers the care that you want if you are in a coma, near death, or unable to make decisions for yourself  You can list the treatments you want for each condition  Treatment may include pain medicine, surgery, blood transfusions, dialysis, IV or tube feedings, and a ventilator (breathing machine)    · Values history: This document has questions about your views, beliefs, and how you feel and think about life  This information can help others choose the care that you would choose  Why are advance directives important? An advance directive helps you control your care  Although spoken wishes may be used, it is better to have your wishes written down  Spoken wishes can be misunderstood, or not followed  Treatments may be given even if you do not want them  An advance directive may make it easier for your family to make difficult choices about your care  Weight Management   Why it is important to manage your weight:  Being overweight increases your risk of health conditions such as heart disease, high blood pressure, type 2 diabetes, and certain types of cancer  It can also increase your risk for osteoarthritis, sleep apnea, and other respiratory problems  Aim for a slow, steady weight loss  Even a small amount of weight loss can lower your risk of health problems  How to lose weight safely:  A safe and healthy way to lose weight is to eat fewer calories and get regular exercise  You can lose up about 1 pound a week by decreasing the number of calories you eat by 500 calories each day  Healthy meal plan for weight management:  A healthy meal plan includes a variety of foods, contains fewer calories, and helps you stay healthy  A healthy meal plan includes the following:  · Eat whole-grain foods more often  A healthy meal plan should contain fiber  Fiber is the part of grains, fruits, and vegetables that is not broken down by your body  Whole-grain foods are healthy and provide extra fiber in your diet  Some examples of whole-grain foods are whole-wheat breads and pastas, oatmeal, brown rice, and bulgur  · Eat a variety of vegetables every day  Include dark, leafy greens such as spinach, kale, rubi greens, and mustard greens  Eat yellow and orange vegetables such as carrots, sweet potatoes, and winter squash     · Eat a variety of fruits every day  Choose fresh or canned fruit (canned in its own juice or light syrup) instead of juice  Fruit juice has very little or no fiber  · Eat low-fat dairy foods  Drink fat-free (skim) milk or 1% milk  Eat fat-free yogurt and low-fat cottage cheese  Try low-fat cheeses such as mozzarella and other reduced-fat cheeses  · Choose meat and other protein foods that are low in fat  Choose beans or other legumes such as split peas or lentils  Choose fish, skinless poultry (chicken or turkey), or lean cuts of red meat (beef or pork)  Before you cook meat or poultry, cut off any visible fat  · Use less fat and oil  Try baking foods instead of frying them  Add less fat, such as margarine, sour cream, regular salad dressing and mayonnaise to foods  Eat fewer high-fat foods  Some examples of high-fat foods include french fries, doughnuts, ice cream, and cakes  · Eat fewer sweets  Limit foods and drinks that are high in sugar  This includes candy, cookies, regular soda, and sweetened drinks  Exercise:  Exercise at least 30 minutes per day on most days of the week  Some examples of exercise include walking, biking, dancing, and swimming  You can also fit in more physical activity by taking the stairs instead of the elevator or parking farther away from stores  Ask your healthcare provider about the best exercise plan for you  © Copyright [employer] 2018 Information is for End User's use only and may not be sold, redistributed or otherwise used for commercial purposes   All illustrations and images included in CareNotes® are the copyrighted property of [employer]  or [address] [place] [address]

## 2020-07-09 ENCOUNTER — HOSPITAL ENCOUNTER (OUTPATIENT)
Dept: RADIOLOGY | Age: 85
Discharge: HOME | End: 2020-07-09
Payer: MEDICARE

## 2020-07-09 DIAGNOSIS — H53.131 SUDDEN VISUAL LOSS OF RIGHT EYE: ICD-10-CM

## 2020-07-09 DIAGNOSIS — D33.2 BENIGN NEOPLASM OF BRAIN, UNSPECIFIED BRAIN REGION (HCC): ICD-10-CM

## 2020-07-09 PROCEDURE — A9585 GADOBUTROL INJECTION: HCPCS | Performed by: INTERNAL MEDICINE

## 2020-07-09 PROCEDURE — 70553 MRI BRAIN STEM W/O & W/DYE: CPT

## 2020-07-09 RX ADMIN — GADOBUTROL 7 ML: 604.72 INJECTION INTRAVENOUS at 12:33

## 2020-10-14 ENCOUNTER — EVALUATION (OUTPATIENT)
Dept: PHYSICAL THERAPY | Age: 85
End: 2020-10-14
Payer: MEDICARE

## 2020-10-14 DIAGNOSIS — M17.11 UNILATERAL PRIMARY OSTEOARTHRITIS, RIGHT KNEE: Primary | ICD-10-CM

## 2020-10-14 PROCEDURE — 97162 PT EVAL MOD COMPLEX 30 MIN: CPT | Performed by: PHYSICAL THERAPIST

## 2020-10-14 PROCEDURE — 97110 THERAPEUTIC EXERCISES: CPT | Performed by: PHYSICAL THERAPIST

## 2020-10-15 ENCOUNTER — OFFICE VISIT (OUTPATIENT)
Dept: PHYSICAL THERAPY | Age: 85
End: 2020-10-15
Payer: MEDICARE

## 2020-10-15 DIAGNOSIS — M17.11 UNILATERAL PRIMARY OSTEOARTHRITIS, RIGHT KNEE: Primary | ICD-10-CM

## 2020-10-15 PROCEDURE — 97110 THERAPEUTIC EXERCISES: CPT | Performed by: PHYSICAL THERAPIST

## 2020-10-20 ENCOUNTER — OFFICE VISIT (OUTPATIENT)
Dept: PHYSICAL THERAPY | Age: 85
End: 2020-10-20
Payer: MEDICARE

## 2020-10-20 DIAGNOSIS — M17.11 UNILATERAL PRIMARY OSTEOARTHRITIS, RIGHT KNEE: Primary | ICD-10-CM

## 2020-10-20 PROCEDURE — 97110 THERAPEUTIC EXERCISES: CPT | Performed by: PHYSICAL THERAPIST

## 2020-10-22 ENCOUNTER — OFFICE VISIT (OUTPATIENT)
Dept: PHYSICAL THERAPY | Age: 85
End: 2020-10-22
Payer: MEDICARE

## 2020-10-22 DIAGNOSIS — M17.11 UNILATERAL PRIMARY OSTEOARTHRITIS, RIGHT KNEE: Primary | ICD-10-CM

## 2020-10-22 PROCEDURE — 97110 THERAPEUTIC EXERCISES: CPT

## 2020-10-22 PROCEDURE — 97140 MANUAL THERAPY 1/> REGIONS: CPT

## 2020-10-27 ENCOUNTER — OFFICE VISIT (OUTPATIENT)
Dept: PHYSICAL THERAPY | Age: 85
End: 2020-10-27
Payer: MEDICARE

## 2020-10-27 DIAGNOSIS — M17.11 UNILATERAL PRIMARY OSTEOARTHRITIS, RIGHT KNEE: Primary | ICD-10-CM

## 2020-10-27 PROCEDURE — 97110 THERAPEUTIC EXERCISES: CPT | Performed by: PHYSICAL THERAPIST

## 2020-10-27 PROCEDURE — 97112 NEUROMUSCULAR REEDUCATION: CPT | Performed by: PHYSICAL THERAPIST

## 2020-10-28 DIAGNOSIS — Z12.31 VISIT FOR SCREENING MAMMOGRAM: ICD-10-CM

## 2020-10-29 ENCOUNTER — APPOINTMENT (OUTPATIENT)
Dept: PHYSICAL THERAPY | Age: 85
End: 2020-10-29
Payer: MEDICARE

## 2020-11-03 ENCOUNTER — OFFICE VISIT (OUTPATIENT)
Dept: PHYSICAL THERAPY | Age: 85
End: 2020-11-03
Payer: MEDICARE

## 2020-11-03 DIAGNOSIS — M17.11 UNILATERAL PRIMARY OSTEOARTHRITIS, RIGHT KNEE: Primary | ICD-10-CM

## 2020-11-03 PROCEDURE — 97750 PHYSICAL PERFORMANCE TEST: CPT | Performed by: PHYSICAL THERAPIST

## 2020-11-03 PROCEDURE — 97110 THERAPEUTIC EXERCISES: CPT | Performed by: PHYSICAL THERAPIST

## 2020-11-04 ENCOUNTER — OFFICE VISIT (OUTPATIENT)
Dept: PHYSICAL THERAPY | Age: 85
End: 2020-11-04
Payer: MEDICARE

## 2020-11-04 DIAGNOSIS — M17.11 UNILATERAL PRIMARY OSTEOARTHRITIS, RIGHT KNEE: Primary | ICD-10-CM

## 2020-11-04 PROCEDURE — 97110 THERAPEUTIC EXERCISES: CPT | Performed by: PHYSICAL THERAPIST

## 2020-11-05 ENCOUNTER — APPOINTMENT (OUTPATIENT)
Dept: PHYSICAL THERAPY | Age: 85
End: 2020-11-05
Payer: MEDICARE

## 2020-12-08 ENCOUNTER — TRANSCRIBE ORDERS (OUTPATIENT)
Dept: PHYSICAL THERAPY | Age: 85
End: 2020-12-08

## 2020-12-08 DIAGNOSIS — M17.11 OSTEOARTHRITIS OF RIGHT KNEE, UNSPECIFIED OSTEOARTHRITIS TYPE: Primary | ICD-10-CM

## 2021-01-11 ENCOUNTER — LAB (OUTPATIENT)
Dept: LAB | Age: 86
End: 2021-01-11
Payer: MEDICARE

## 2021-01-11 DIAGNOSIS — E78.2 MIXED HYPERLIPIDEMIA: ICD-10-CM

## 2021-01-11 DIAGNOSIS — R73.01 IMPAIRED FASTING GLUCOSE: ICD-10-CM

## 2021-01-11 LAB
ALBUMIN SERPL BCP-MCNC: 3.9 G/DL (ref 3.5–5)
ALP SERPL-CCNC: 92 U/L (ref 46–116)
ALT SERPL W P-5'-P-CCNC: 19 U/L (ref 12–78)
ANION GAP SERPL CALCULATED.3IONS-SCNC: 3 MMOL/L (ref 4–13)
AST SERPL W P-5'-P-CCNC: 15 U/L (ref 5–45)
BASOPHILS # BLD AUTO: 0.05 THOUSANDS/ΜL (ref 0–0.1)
BASOPHILS NFR BLD AUTO: 1 % (ref 0–1)
BILIRUB SERPL-MCNC: 0.35 MG/DL (ref 0.2–1)
BUN SERPL-MCNC: 16 MG/DL (ref 5–25)
CALCIUM SERPL-MCNC: 8.9 MG/DL (ref 8.3–10.1)
CHLORIDE SERPL-SCNC: 107 MMOL/L (ref 100–108)
CHOLEST SERPL-MCNC: 227 MG/DL (ref 50–200)
CO2 SERPL-SCNC: 30 MMOL/L (ref 21–32)
CREAT SERPL-MCNC: 0.82 MG/DL (ref 0.6–1.3)
EOSINOPHIL # BLD AUTO: 0.16 THOUSAND/ΜL (ref 0–0.61)
EOSINOPHIL NFR BLD AUTO: 3 % (ref 0–6)
ERYTHROCYTE [DISTWIDTH] IN BLOOD BY AUTOMATED COUNT: 13.7 % (ref 11.6–15.1)
EST. AVERAGE GLUCOSE BLD GHB EST-MCNC: 126 MG/DL
GFR SERPL CREATININE-BSD FRML MDRD: 75 ML/MIN/1.73SQ M
GLUCOSE P FAST SERPL-MCNC: 90 MG/DL (ref 65–99)
HBA1C MFR BLD: 6 %
HCT VFR BLD AUTO: 39.6 % (ref 34.8–46.1)
HDLC SERPL-MCNC: 133 MG/DL
HGB BLD-MCNC: 12.4 G/DL (ref 11.5–15.4)
IMM GRANULOCYTES # BLD AUTO: 0.02 THOUSAND/UL (ref 0–0.2)
IMM GRANULOCYTES NFR BLD AUTO: 0 % (ref 0–2)
LDLC SERPL CALC-MCNC: 78 MG/DL (ref 0–100)
LYMPHOCYTES # BLD AUTO: 2.38 THOUSANDS/ΜL (ref 0.6–4.47)
LYMPHOCYTES NFR BLD AUTO: 45 % (ref 14–44)
MCH RBC QN AUTO: 31.2 PG (ref 26.8–34.3)
MCHC RBC AUTO-ENTMCNC: 31.3 G/DL (ref 31.4–37.4)
MCV RBC AUTO: 100 FL (ref 82–98)
MONOCYTES # BLD AUTO: 0.55 THOUSAND/ΜL (ref 0.17–1.22)
MONOCYTES NFR BLD AUTO: 10 % (ref 4–12)
NEUTROPHILS # BLD AUTO: 2.23 THOUSANDS/ΜL (ref 1.85–7.62)
NEUTS SEG NFR BLD AUTO: 41 % (ref 43–75)
NONHDLC SERPL-MCNC: 94 MG/DL
NRBC BLD AUTO-RTO: 0 /100 WBCS
PLATELET # BLD AUTO: 225 THOUSANDS/UL (ref 149–390)
PMV BLD AUTO: 10.3 FL (ref 8.9–12.7)
POTASSIUM SERPL-SCNC: 4 MMOL/L (ref 3.5–5.3)
PROT SERPL-MCNC: 7.4 G/DL (ref 6.4–8.2)
RBC # BLD AUTO: 3.98 MILLION/UL (ref 3.81–5.12)
SODIUM SERPL-SCNC: 140 MMOL/L (ref 136–145)
TRIGL SERPL-MCNC: 78 MG/DL
WBC # BLD AUTO: 5.39 THOUSAND/UL (ref 4.31–10.16)

## 2021-01-11 PROCEDURE — 36415 COLL VENOUS BLD VENIPUNCTURE: CPT

## 2021-01-11 PROCEDURE — 80053 COMPREHEN METABOLIC PANEL: CPT

## 2021-01-11 PROCEDURE — 80061 LIPID PANEL: CPT

## 2021-01-11 PROCEDURE — 85025 COMPLETE CBC W/AUTO DIFF WBC: CPT

## 2021-01-11 PROCEDURE — 83036 HEMOGLOBIN GLYCOSYLATED A1C: CPT

## 2021-01-20 DIAGNOSIS — Z23 ENCOUNTER FOR IMMUNIZATION: ICD-10-CM

## 2021-01-21 ENCOUNTER — OFFICE VISIT (OUTPATIENT)
Dept: INTERNAL MEDICINE CLINIC | Facility: CLINIC | Age: 86
End: 2021-01-21
Payer: MEDICARE

## 2021-01-21 VITALS
TEMPERATURE: 98.6 F | DIASTOLIC BLOOD PRESSURE: 74 MMHG | BODY MASS INDEX: 29.23 KG/M2 | RESPIRATION RATE: 16 BRPM | HEART RATE: 78 BPM | HEIGHT: 63 IN | OXYGEN SATURATION: 99 % | WEIGHT: 165 LBS | SYSTOLIC BLOOD PRESSURE: 126 MMHG

## 2021-01-21 DIAGNOSIS — E78.2 MIXED HYPERLIPIDEMIA: ICD-10-CM

## 2021-01-21 DIAGNOSIS — M81.0 AGE-RELATED OSTEOPOROSIS WITHOUT CURRENT PATHOLOGICAL FRACTURE: ICD-10-CM

## 2021-01-21 DIAGNOSIS — K21.9 GERD WITHOUT ESOPHAGITIS: Primary | ICD-10-CM

## 2021-01-21 DIAGNOSIS — R73.01 IMPAIRED FASTING GLUCOSE: ICD-10-CM

## 2021-01-21 DIAGNOSIS — H40.10X0 OPEN-ANGLE GLAUCOMA, UNSPECIFIED GLAUCOMA STAGE, UNSPECIFIED LATERALITY, UNSPECIFIED OPEN-ANGLE GLAUCOMA TYPE: ICD-10-CM

## 2021-01-21 PROCEDURE — 99214 OFFICE O/P EST MOD 30 MIN: CPT | Performed by: INTERNAL MEDICINE

## 2021-01-21 PROCEDURE — 96372 THER/PROPH/DIAG INJ SC/IM: CPT | Performed by: INTERNAL MEDICINE

## 2021-01-21 RX ORDER — OMEPRAZOLE 20 MG/1
20 CAPSULE, DELAYED RELEASE ORAL DAILY
Qty: 90 CAPSULE | Refills: 3 | Status: SHIPPED | OUTPATIENT
Start: 2021-01-21 | End: 2022-03-24

## 2021-01-21 NOTE — PROGRESS NOTES
Assessment/Plan:       Chronic problems are stable  Continue present medications  Continue diet and exercise  Ordered labs for next visit  Quality Measures:       No follow-ups on file  No problem-specific Assessment & Plan notes found for this encounter  Diagnoses and all orders for this visit:    GERD without esophagitis  -     omeprazole (PriLOSEC) 20 mg delayed release capsule; Take 1 capsule (20 mg total) by mouth daily    Mixed hyperlipidemia  -     CBC and differential; Future  -     Comprehensive metabolic panel; Future  -     Lipid panel; Future    Impaired fasting glucose  -     Hemoglobin A1C; Future    Open-angle glaucoma, unspecified glaucoma stage, unspecified laterality, unspecified open-angle glaucoma type    Age-related osteoporosis without current pathological fracture  -     denosumab (PROLIA) subcutaneous injection 60 mg          Subjective:      Patient ID: Francisco Sandy is a 80 y o  female  Patient comes in today for routine follow-up  She states she is doing well  She has been pretty much staying at home during the pandemic  She is scheduled for her COVID vaccine next week  Her chronic problems have been fairly stable  Blood pressure has been controlled  Reflux has been controlled  Arthritis is about the same  She does report she has been having some trouble with her glaucoma and her drops have had to be adjusted        ALLERGIES:  No Known Allergies    CURRENT MEDICATIONS:    Current Outpatient Medications:     omeprazole (PriLOSEC) 20 mg delayed release capsule, Take 1 capsule (20 mg total) by mouth daily, Disp: 90 capsule, Rfl: 3    rosuvastatin (CRESTOR) 10 MG tablet, Take 1 tablet (10 mg total) by mouth daily, Disp: 90 tablet, Rfl: 1    TRAVATAN Z 0 004 % ophthalmic solution, , Disp: , Rfl:     HALOG 0 1 % CREA, , Disp: , Rfl:     Current Facility-Administered Medications:     denosumab (PROLIA) subcutaneous injection 60 mg, 60 mg, Subcutaneous, Once, Malaika Donnelly MD    ACTIVE PROBLEM LIST:  Patient Active Problem List   Diagnosis    Aortic stenosis, mild    DJD (degenerative joint disease), multiple sites    GERD without esophagitis    Glaucoma    Mixed hyperlipidemia    Impaired fasting glucose    Osteoporosis    Psoriasis    Precordial pain       PAST MEDICAL HISTORY:  Past Medical History:   Diagnosis Date    Hypertension     Osteoporosis        PAST SURGICAL HISTORY:  No past surgical history on file  FAMILY HISTORY:  Family History   Problem Relation Age of Onset    Hypertension Father        SOCIAL HISTORY:  Social History     Socioeconomic History    Marital status:      Spouse name: Not on file    Number of children: Not on file    Years of education: Not on file    Highest education level: Not on file   Occupational History    Not on file   Social Needs    Financial resource strain: Not on file    Food insecurity     Worry: Not on file     Inability: Not on file    Transportation needs     Medical: Not on file     Non-medical: Not on file   Tobacco Use    Smoking status: Former Smoker     Packs/day: 0 50     Types: Cigarettes    Smokeless tobacco: Former User    Tobacco comment: Patient reports she smoked for 20 years   Substance and Sexual Activity    Alcohol use:  Yes    Drug use: No    Sexual activity: Never   Lifestyle    Physical activity     Days per week: Not on file     Minutes per session: Not on file    Stress: Not on file   Relationships    Social connections     Talks on phone: Not on file     Gets together: Not on file     Attends [employer] service: Not on file     Active member of club or organization: Not on file     Attends meetings of clubs or organizations: Not on file     Relationship status: Not on file    Intimate partner violence     Fear of current or ex partner: Not on file     Emotionally abused: Not on file     Physically abused: Not on file     Forced sexual activity: Not on file Other Topics Concern    Not on file   Social History Narrative    Not on file       Review of Systems   Respiratory: Negative for shortness of breath  Cardiovascular: Negative for chest pain  Gastrointestinal: Negative for abdominal pain  Objective:  Vitals:    01/21/21 1328   BP: 126/74   BP Location: Left arm   Patient Position: Sitting   Cuff Size: Standard   Pulse: 78   Resp: 16   Temp: 98 6 °F (37 °C)   TempSrc: Tympanic   SpO2: 99%   Weight: 74 8 kg (165 lb)   Height: 5' 3" (1 6 m)     Body mass index is 29 23 kg/m²  Physical Exam  Vitals signs and nursing note reviewed  Constitutional:       Appearance: She is well-developed  Cardiovascular:      Rate and Rhythm: Normal rate and regular rhythm  Heart sounds: Normal heart sounds  Pulmonary:      Effort: Pulmonary effort is normal       Breath sounds: Normal breath sounds  Abdominal:      Palpations: Abdomen is soft  Tenderness: There is no abdominal tenderness  Neurological:      Mental Status: She is alert and oriented to person, place, and time             RESULTS:    Recent Results (from the past 1008 hour(s))   Comprehensive metabolic panel    Collection Time: 01/11/21  9:19 AM   Result Value Ref Range    Sodium 140 136 - 145 mmol/L    Potassium 4 0 3 5 - 5 3 mmol/L    Chloride 107 100 - 108 mmol/L    CO2 30 21 - 32 mmol/L    ANION GAP 3 (L) 4 - 13 mmol/L    BUN 16 5 - 25 mg/dL    Creatinine 0 82 0 60 - 1 30 mg/dL    Glucose, Fasting 90 65 - 99 mg/dL    Calcium 8 9 8 3 - 10 1 mg/dL    AST 15 5 - 45 U/L    ALT 19 12 - 78 U/L    Alkaline Phosphatase 92 46 - 116 U/L    Total Protein 7 4 6 4 - 8 2 g/dL    Albumin 3 9 3 5 - 5 0 g/dL    Total Bilirubin 0 35 0 20 - 1 00 mg/dL    eGFR 75 ml/min/1 73sq m   CBC and differential    Collection Time: 01/11/21  9:19 AM   Result Value Ref Range    WBC 5 39 4 31 - 10 16 Thousand/uL    RBC 3 98 3 81 - 5 12 Million/uL    Hemoglobin 12 4 11 5 - 15 4 g/dL    Hematocrit 39 6 34 8 - 46 1 %     (H) 82 - 98 fL    MCH 31 2 26 8 - 34 3 pg    MCHC 31 3 (L) 31 4 - 37 4 g/dL    RDW 13 7 11 6 - 15 1 %    MPV 10 3 8 9 - 12 7 fL    Platelets 649 [id number] - 200 Thousands/uL    nRBC 0 /100 WBCs    Neutrophils Relative 41 (L) 43 - 75 %    Immat GRANS % 0 0 - 2 %    Lymphocytes Relative 45 (H) 14 - 44 %    Monocytes Relative 10 4 - 12 %    Eosinophils Relative 3 0 - 6 %    Basophils Relative 1 0 - 1 %    Neutrophils Absolute 2 23 1 85 - 7 62 Thousands/µL    Immature Grans Absolute 0 02 0 00 - 0 20 Thousand/uL    Lymphocytes Absolute 2 38 0 60 - 4 47 Thousands/µL    Monocytes Absolute 0 55 0 17 - 1 22 Thousand/µL    Eosinophils Absolute 0 16 0 00 - 0 61 Thousand/µL    Basophils Absolute 0 05 0 00 - 0 10 Thousands/µL   Hemoglobin A1C    Collection Time: 01/11/21  9:19 AM   Result Value Ref Range    Hemoglobin A1C 6 0 (H) Normal 3 8-5 6%; PreDiabetic 5 7-6 4%; Diabetic >=6 5%; Glycemic control for adults with diabetes <7 0% %     mg/dl   Lipid panel    Collection Time: 01/11/21  9:19 AM   Result Value Ref Range    Cholesterol 227 (H) 50 - 200 mg/dL    Triglycerides 78 <=150 mg/dL    HDL, Direct 133 >=40 mg/dL    LDL Calculated 78 0 - 100 mg/dL    Non-HDL-Chol (CHOL-HDL) 94 mg/dl       This note was created with voice recognition software  Phonic, grammatical and spelling errors may be present within the note as a result

## 2021-01-25 ENCOUNTER — IMMUNIZATIONS (OUTPATIENT)
Dept: FAMILY MEDICINE CLINIC | Facility: HOSPITAL | Age: 86
End: 2021-01-25

## 2021-01-25 DIAGNOSIS — Z23 ENCOUNTER FOR IMMUNIZATION: Primary | ICD-10-CM

## 2021-01-25 PROCEDURE — 0001A SARS-COV-2 / COVID-19 MRNA VACCINE (PFIZER-BIONTECH) 30 MCG: CPT

## 2021-01-25 PROCEDURE — 91300 SARS-COV-2 / COVID-19 MRNA VACCINE (PFIZER-BIONTECH) 30 MCG: CPT

## 2021-02-12 DIAGNOSIS — M15.9 PRIMARY OSTEOARTHRITIS INVOLVING MULTIPLE JOINTS: Primary | ICD-10-CM

## 2021-02-12 RX ORDER — CELECOXIB 100 MG/1
100 CAPSULE ORAL 2 TIMES DAILY
Qty: 90 CAPSULE | Refills: 3 | Status: SHIPPED | OUTPATIENT
Start: 2021-02-12 | End: 2021-09-23 | Stop reason: SDUPTHER

## 2021-02-12 RX ORDER — CELECOXIB 100 MG/1
100 CAPSULE ORAL 2 TIMES DAILY
COMMUNITY
End: 2021-02-12 | Stop reason: SDUPTHER

## 2021-02-23 ENCOUNTER — IMMUNIZATIONS (OUTPATIENT)
Dept: FAMILY MEDICINE CLINIC | Facility: HOSPITAL | Age: 86
End: 2021-02-23

## 2021-02-23 DIAGNOSIS — Z23 ENCOUNTER FOR IMMUNIZATION: Primary | ICD-10-CM

## 2021-02-23 PROCEDURE — 91300 SARS-COV-2 / COVID-19 MRNA VACCINE (PFIZER-BIONTECH) 30 MCG: CPT

## 2021-02-23 PROCEDURE — 0002A SARS-COV-2 / COVID-19 MRNA VACCINE (PFIZER-BIONTECH) 30 MCG: CPT

## 2021-06-07 ENCOUNTER — TELEPHONE (OUTPATIENT)
Dept: INTERNAL MEDICINE CLINIC | Facility: CLINIC | Age: 86
End: 2021-06-07

## 2021-06-08 ENCOUNTER — PREPPED CHART (OUTPATIENT)
Dept: URBAN - METROPOLITAN AREA CLINIC 6 | Facility: CLINIC | Age: 86
End: 2021-06-08

## 2021-07-27 ENCOUNTER — CLINICAL SUPPORT (OUTPATIENT)
Dept: INTERNAL MEDICINE CLINIC | Facility: CLINIC | Age: 86
End: 2021-07-27
Payer: MEDICARE

## 2021-07-27 DIAGNOSIS — M81.0 AGE-RELATED OSTEOPOROSIS WITHOUT CURRENT PATHOLOGICAL FRACTURE: Primary | ICD-10-CM

## 2021-07-27 PROCEDURE — 96372 THER/PROPH/DIAG INJ SC/IM: CPT

## 2021-09-15 ENCOUNTER — APPOINTMENT (OUTPATIENT)
Dept: LAB | Age: 86
End: 2021-09-15
Payer: MEDICARE

## 2021-09-15 DIAGNOSIS — E78.2 MIXED HYPERLIPIDEMIA: ICD-10-CM

## 2021-09-15 DIAGNOSIS — R73.01 IMPAIRED FASTING GLUCOSE: ICD-10-CM

## 2021-09-15 LAB
ALBUMIN SERPL BCP-MCNC: 3.8 G/DL (ref 3.5–5)
ALP SERPL-CCNC: 67 U/L (ref 46–116)
ALT SERPL W P-5'-P-CCNC: 21 U/L (ref 12–78)
ANION GAP SERPL CALCULATED.3IONS-SCNC: 2 MMOL/L (ref 4–13)
AST SERPL W P-5'-P-CCNC: 20 U/L (ref 5–45)
BASOPHILS # BLD AUTO: 0.03 THOUSANDS/ΜL (ref 0–0.1)
BASOPHILS NFR BLD AUTO: 1 % (ref 0–1)
BILIRUB SERPL-MCNC: 0.54 MG/DL (ref 0.2–1)
BUN SERPL-MCNC: 12 MG/DL (ref 5–25)
CALCIUM SERPL-MCNC: 9.3 MG/DL (ref 8.3–10.1)
CHLORIDE SERPL-SCNC: 104 MMOL/L (ref 100–108)
CHOLEST SERPL-MCNC: 206 MG/DL (ref 50–200)
CO2 SERPL-SCNC: 30 MMOL/L (ref 21–32)
CREAT SERPL-MCNC: 0.71 MG/DL (ref 0.6–1.3)
EOSINOPHIL # BLD AUTO: 0.17 THOUSAND/ΜL (ref 0–0.61)
EOSINOPHIL NFR BLD AUTO: 4 % (ref 0–6)
ERYTHROCYTE [DISTWIDTH] IN BLOOD BY AUTOMATED COUNT: 14.1 % (ref 11.6–15.1)
EST. AVERAGE GLUCOSE BLD GHB EST-MCNC: 128 MG/DL
GFR SERPL CREATININE-BSD FRML MDRD: 90 ML/MIN/1.73SQ M
GLUCOSE P FAST SERPL-MCNC: 97 MG/DL (ref 65–99)
HBA1C MFR BLD: 6.1 %
HCT VFR BLD AUTO: 39.2 % (ref 34.8–46.1)
HDLC SERPL-MCNC: 110 MG/DL
HGB BLD-MCNC: 12.2 G/DL (ref 11.5–15.4)
IMM GRANULOCYTES # BLD AUTO: 0.02 THOUSAND/UL (ref 0–0.2)
IMM GRANULOCYTES NFR BLD AUTO: 1 % (ref 0–2)
LDLC SERPL CALC-MCNC: 79 MG/DL (ref 0–100)
LYMPHOCYTES # BLD AUTO: 1.56 THOUSANDS/ΜL (ref 0.6–4.47)
LYMPHOCYTES NFR BLD AUTO: 37 % (ref 14–44)
MCH RBC QN AUTO: 31.2 PG (ref 26.8–34.3)
MCHC RBC AUTO-ENTMCNC: 31.1 G/DL (ref 31.4–37.4)
MCV RBC AUTO: 100 FL (ref 82–98)
MONOCYTES # BLD AUTO: 0.39 THOUSAND/ΜL (ref 0.17–1.22)
MONOCYTES NFR BLD AUTO: 9 % (ref 4–12)
NEUTROPHILS # BLD AUTO: 2.1 THOUSANDS/ΜL (ref 1.85–7.62)
NEUTS SEG NFR BLD AUTO: 48 % (ref 43–75)
NONHDLC SERPL-MCNC: 96 MG/DL
NRBC BLD AUTO-RTO: 0 /100 WBCS
PLATELET # BLD AUTO: 193 THOUSANDS/UL (ref 149–390)
PMV BLD AUTO: 11.6 FL (ref 8.9–12.7)
POTASSIUM SERPL-SCNC: 4.4 MMOL/L (ref 3.5–5.3)
PROT SERPL-MCNC: 7.6 G/DL (ref 6.4–8.2)
RBC # BLD AUTO: 3.91 MILLION/UL (ref 3.81–5.12)
SODIUM SERPL-SCNC: 136 MMOL/L (ref 136–145)
TRIGL SERPL-MCNC: 86 MG/DL
WBC # BLD AUTO: 4.27 THOUSAND/UL (ref 4.31–10.16)

## 2021-09-15 PROCEDURE — 85025 COMPLETE CBC W/AUTO DIFF WBC: CPT

## 2021-09-15 PROCEDURE — 80061 LIPID PANEL: CPT

## 2021-09-15 PROCEDURE — 80053 COMPREHEN METABOLIC PANEL: CPT

## 2021-09-15 PROCEDURE — 36415 COLL VENOUS BLD VENIPUNCTURE: CPT

## 2021-09-15 PROCEDURE — 83036 HEMOGLOBIN GLYCOSYLATED A1C: CPT

## 2021-09-23 ENCOUNTER — OFFICE VISIT (OUTPATIENT)
Dept: INTERNAL MEDICINE CLINIC | Facility: CLINIC | Age: 86
End: 2021-09-23
Payer: MEDICARE

## 2021-09-23 VITALS
DIASTOLIC BLOOD PRESSURE: 80 MMHG | HEIGHT: 63 IN | TEMPERATURE: 98.2 F | WEIGHT: 165.4 LBS | BODY MASS INDEX: 29.3 KG/M2 | RESPIRATION RATE: 18 BRPM | SYSTOLIC BLOOD PRESSURE: 116 MMHG | OXYGEN SATURATION: 93 % | HEART RATE: 73 BPM

## 2021-09-23 DIAGNOSIS — Z00.00 MEDICARE ANNUAL WELLNESS VISIT, SUBSEQUENT: Primary | ICD-10-CM

## 2021-09-23 DIAGNOSIS — R73.01 IMPAIRED FASTING GLUCOSE: ICD-10-CM

## 2021-09-23 DIAGNOSIS — M15.9 PRIMARY OSTEOARTHRITIS INVOLVING MULTIPLE JOINTS: ICD-10-CM

## 2021-09-23 DIAGNOSIS — K21.9 GERD WITHOUT ESOPHAGITIS: ICD-10-CM

## 2021-09-23 DIAGNOSIS — H40.10X0 OPEN-ANGLE GLAUCOMA, UNSPECIFIED GLAUCOMA STAGE, UNSPECIFIED LATERALITY, UNSPECIFIED OPEN-ANGLE GLAUCOMA TYPE: ICD-10-CM

## 2021-09-23 DIAGNOSIS — E78.2 MIXED HYPERLIPIDEMIA: ICD-10-CM

## 2021-09-23 PROCEDURE — G0439 PPPS, SUBSEQ VISIT: HCPCS | Performed by: INTERNAL MEDICINE

## 2021-09-23 PROCEDURE — 99214 OFFICE O/P EST MOD 30 MIN: CPT | Performed by: INTERNAL MEDICINE

## 2021-09-23 PROCEDURE — 1123F ACP DISCUSS/DSCN MKR DOCD: CPT | Performed by: INTERNAL MEDICINE

## 2021-09-23 RX ORDER — CELECOXIB 100 MG/1
100 CAPSULE ORAL DAILY
Qty: 90 CAPSULE | Refills: 3 | Status: SHIPPED | OUTPATIENT
Start: 2021-09-23

## 2021-09-23 NOTE — PATIENT INSTRUCTIONS
Medicare Preventive Visit Patient Instructions  Thank you for completing your Welcome to Medicare Visit or Medicare Annual Wellness Visit today  Your next wellness visit will be due in one year (9/24/2022)  The screening/preventive services that you may require over the next 5-10 years are detailed below  Some tests may not apply to you based off risk factors and/or age  Screening tests ordered at today's visit but not completed yet may show as past due  Also, please note that scanned in results may not display below  Preventive Screenings:  Service Recommendations Previous Testing/Comments   Colorectal Cancer Screening  * Colonoscopy    * Fecal Occult Blood Test (FOBT)/Fecal Immunochemical Test (FIT)  * Fecal DNA/Cologuard Test  * Flexible Sigmoidoscopy Age: 54-65 years old   Colonoscopy: every 10 years (may be performed more frequently if at higher risk)  OR  FOBT/FIT: every 1 year  OR  Cologuard: every 3 years  OR  Sigmoidoscopy: every 5 years  Screening may be recommended earlier than age 48 if at higher risk for colorectal cancer  Also, an individualized decision between you and your healthcare provider will decide whether screening between the ages of 74-80 would be appropriate  Colonoscopy: 02/16/2016  FOBT/FIT: Not on file  Cologuard: Not on file  Sigmoidoscopy: Not on file    Screening Not Indicated     Breast Cancer Screening Age: 36 years old  Frequency: every 1-2 years  Not required if history of left and right mastectomy Mammogram: 10/27/2020    Screening Current   Cervical Cancer Screening Between the ages of 21-29, pap smear recommended once every 3 years  Between the ages of 33-67, can perform pap smear with HPV co-testing every 5 years     Recommendations may differ for women with a history of total hysterectomy, cervical cancer, or abnormal pap smears in past  Pap Smear: Not on file    Screening Not Indicated   Hepatitis C Screening Once for adults born between 1945 and 1965  More frequently in patients at high risk for Hepatitis C Hep C Antibody: Not on file    Screening Not Indicated   Diabetes Screening 1-2 times per year if you're at risk for diabetes or have pre-diabetes Fasting glucose: 97 mg/dL   A1C: 6 1 %    Screening Current   Cholesterol Screening Once every 5 years if you don't have a lipid disorder  May order more often based on risk factors  Lipid panel: 09/15/2021    Screening Not Indicated  History Lipid Disorder     Other Preventive Screenings Covered by Medicare:  1  Abdominal Aortic Aneurysm (AAA) Screening: covered once if your at risk  You're considered to be at risk if you have a family history of AAA  2  Lung Cancer Screening: covers low dose CT scan once per year if you meet all of the following conditions: (1) Age 50-69; (2) No signs or symptoms of lung cancer; (3) Current smoker or have quit smoking within the last 15 years; (4) You have a tobacco smoking history of at least 30 pack years (packs per day multiplied by number of years you smoked); (5) You get a written order from a healthcare provider  3  Glaucoma Screening: covered annually if you're considered high risk: (1) You have diabetes OR (2) Family history of glaucoma OR (3)  aged 48 and older OR (3)  American aged 72 and older  3  Osteoporosis Screening: covered every 2 years if you meet one of the following conditions: (1) You're estrogen deficient and at risk for osteoporosis based off medical history and other findings; (2) Have a vertebral abnormality; (3) On glucocorticoid therapy for more than 3 months; (4) Have primary hyperparathyroidism; (5) On osteoporosis medications and need to assess response to drug therapy  · Last bone density test (DXA Scan): 07/02/2019   5  HIV Screening: covered annually if you're between the age of 15-65  Also covered annually if you are younger than 13 and older than 72 with risk factors for HIV infection   For pregnant patients, it is covered up to 3 times per pregnancy  Immunizations:  Immunization Recommendations   Influenza Vaccine Annual influenza vaccination during flu season is recommended for all persons aged >= 6 months who do not have contraindications   Pneumococcal Vaccine (Prevnar and Pneumovax)  * Prevnar = PCV13  * Pneumovax = PPSV23   Adults 25-60 years old: 1-3 doses may be recommended based on certain risk factors  Adults 72 years old: Prevnar (PCV13) vaccine recommended followed by Pneumovax (PPSV23) vaccine  If already received PPSV23 since turning 65, then PCV13 recommended at least one year after PPSV23 dose  Hepatitis B Vaccine 3 dose series if at intermediate or high risk (ex: diabetes, end stage renal disease, liver disease)   Tetanus (Td) Vaccine - COST NOT COVERED BY MEDICARE PART B Following completion of primary series, a booster dose should be given every 10 years to maintain immunity against tetanus  Td may also be given as tetanus wound prophylaxis  Tdap Vaccine - COST NOT COVERED BY MEDICARE PART B Recommended at least once for all adults  For pregnant patients, recommended with each pregnancy  Shingles Vaccine (Shingrix) - COST NOT COVERED BY MEDICARE PART B  2 shot series recommended in those aged 48 and above     Health Maintenance Due:      Topic Date Due    Colorectal Cancer Screening  02/16/2019    Breast Cancer Screening: Mammogram  10/27/2022     Immunizations Due:      Topic Date Due    DTaP,Tdap,and Td Vaccines (1 - Tdap) Never done    Influenza Vaccine (1) 09/01/2021     Advance Directives   What are advance directives? Advance directives are legal documents that state your wishes and plans for medical care  These plans are made ahead of time in case you lose your ability to make decisions for yourself  Advance directives can apply to any medical decision, such as the treatments you want, and if you want to donate organs  What are the types of advance directives?   There are many types of advance directives, and each state has rules about how to use them  You may choose a combination of any of the following:  · Living will: This is a written record of the treatment you want  You can also choose which treatments you do not want, which to limit, and which to stop at a certain time  This includes surgery, medicine, IV fluid, and tube feedings  · Durable power of  for healthcare [facility]): This is a written record that states who you want to make healthcare choices for you when you are unable to make them for yourself  This person, called a proxy, is usually a family member or a friend  You may choose more than 1 proxy  · Do not resuscitate (DNR) order:  A DNR order is used in case your heart stops beating or you stop breathing  It is a request not to have certain forms of treatment, such as CPR  A DNR order may be included in other types of advance directives  · Medical directive: This covers the care that you want if you are in a coma, near death, or unable to make decisions for yourself  You can list the treatments you want for each condition  Treatment may include pain medicine, surgery, blood transfusions, dialysis, IV or tube feedings, and a ventilator (breathing machine)  · Values history: This document has questions about your views, beliefs, and how you feel and think about life  This information can help others choose the care that you would choose  Why are advance directives important? An advance directive helps you control your care  Although spoken wishes may be used, it is better to have your wishes written down  Spoken wishes can be misunderstood, or not followed  Treatments may be given even if you do not want them  An advance directive may make it easier for your family to make difficult choices about your care     Weight Management   Why it is important to manage your weight:  Being overweight increases your risk of health conditions such as heart disease, high blood pressure, type 2 diabetes, and certain types of cancer  It can also increase your risk for osteoarthritis, sleep apnea, and other respiratory problems  Aim for a slow, steady weight loss  Even a small amount of weight loss can lower your risk of health problems  How to lose weight safely:  A safe and healthy way to lose weight is to eat fewer calories and get regular exercise  You can lose up about 1 pound a week by decreasing the number of calories you eat by 500 calories each day  Healthy meal plan for weight management:  A healthy meal plan includes a variety of foods, contains fewer calories, and helps you stay healthy  A healthy meal plan includes the following:  · Eat whole-grain foods more often  A healthy meal plan should contain fiber  Fiber is the part of grains, fruits, and vegetables that is not broken down by your body  Whole-grain foods are healthy and provide extra fiber in your diet  Some examples of whole-grain foods are whole-wheat breads and pastas, oatmeal, brown rice, and bulgur  · Eat a variety of vegetables every day  Include dark, leafy greens such as spinach, kale, rubi greens, and mustard greens  Eat yellow and orange vegetables such as carrots, sweet potatoes, and winter squash  · Eat a variety of fruits every day  Choose fresh or canned fruit (canned in its own juice or light syrup) instead of juice  Fruit juice has very little or no fiber  · Eat low-fat dairy foods  Drink fat-free (skim) milk or 1% milk  Eat fat-free yogurt and low-fat cottage cheese  Try low-fat cheeses such as mozzarella and other reduced-fat cheeses  · Choose meat and other protein foods that are low in fat  Choose beans or other legumes such as split peas or lentils  Choose fish, skinless poultry (chicken or turkey), or lean cuts of red meat (beef or pork)  Before you cook meat or poultry, cut off any visible fat  · Use less fat and oil  Try baking foods instead of frying them   Add less fat, such as margarine, sour cream, regular salad dressing and mayonnaise to foods  Eat fewer high-fat foods  Some examples of high-fat foods include french fries, doughnuts, ice cream, and cakes  · Eat fewer sweets  Limit foods and drinks that are high in sugar  This includes candy, cookies, regular soda, and sweetened drinks  Exercise:  Exercise at least 30 minutes per day on most days of the week  Some examples of exercise include walking, biking, dancing, and swimming  You can also fit in more physical activity by taking the stairs instead of the elevator or parking farther away from stores  Ask your healthcare provider about the best exercise plan for you  Alcohol Use and Your Health    Drinking too much can harm your health  Excessive alcohol use leads to about 88,000 death in the [place] each year, and shortens the life of those who diet by almost 30 years  Further, excessive drinking cost the economy $249 billion in 2010  Most excessive drinkers are not alcohol dependent  Excessive alcohol use has immediate effects that increase the risk of many harmful health conditions  These are most often the result of binge drinking  Over time, excessive alcohol use can lead to the development of chronic diseases and other series health problems  What is considered a "drink"? Excessive alcohol use includes:  · Binge Drinking: For women, 4 or more drinks consumed on one occasion  For men, 5 or more drinks consumed on one occasion  · Heavy Drinking: For women, 8 or more drinks per week  For men, 15 or more drinks per week  · Any alcohol used by pregnant women  · Any alcohol used by those under the age of 21 years    If you choose to drink, do so in moderation:  · Do not drink at all if you are under the age of 24, or if you are or may be pregnant, or have health problems that could be made worse by drinking    · For women, up to 1 drink per day  · For men, up to 2 drinks a day    No one should begin drinking or drink more frequently based on potential health benefits    Short-Term Health Risks:  · Injuries: motor vehicle crashes, falls, drownings, burns  · Violence: homicide, suicide, sexual assault, intimate partner violence  · Alcohol poisoning  · Reproductive health: risky sexual behaviors, unintended prengnacy, sexually transmitted diseases, miscarriage, stillbirth, fetal alcohol syndrome    Long-Term Health Risks:  · Chronic diseases: high blood pressure, heart disease, stroke, liver disease, digestive problems  · Cancers: breast, mouth and throat, liver, colon  · Learning and memory problems: dementia, poor school performance  · Mental health: depression, anxiety, insomnia  · Social problems: lost productivity, family problems, unemployment  · Alcohol dependence    For support and more information:  · Substance Abuse and [facility] [address] [place]  Web Address: https://[employer]/    · Alcoholics Anonymous        Web Address: http://[employer] info/    https://www cdc gov/alcohol/fact-sheets/alcohol-use htm     © Copyright [employer] 2018 Information is for End User's use only and may not be sold, redistributed or otherwise used for commercial purposes   All illustrations and images included in CareNotes® are the copyrighted property of [employer]  or [address] [place], [place] [address]pe

## 2021-09-23 NOTE — PROGRESS NOTES
Assessment and Plan:     Problem List Items Addressed This Visit     None        BMI Counseling: Body mass index is 29 3 kg/m²  The BMI is above normal  Nutrition recommendations include encouraging healthy choices of fruits and vegetables  Rationale for BMI follow-up plan is due to patient being overweight or obese  Depression Screening and Follow-up Plan:   Patient was screened for depression during today's encounter  They screened negative with a PHQ-2 score of 0  Preventive health issues were discussed with patient, and age appropriate screening tests were ordered as noted in patient's After Visit Summary  Personalized health advice and appropriate referrals for health education or preventive services given if needed, as noted in patient's After Visit Summary  History of Present Illness:     Patient presents for Medicare Annual Wellness visit    Patient Care Team:  Jaelyn Gonzalez MD as PCP - General     Problem List:     Patient Active Problem List   Diagnosis    Aortic stenosis, mild    DJD (degenerative joint disease), multiple sites    GERD without esophagitis    Glaucoma    Mixed hyperlipidemia    Impaired fasting glucose    Osteoporosis    Psoriasis    Precordial pain      Past Medical and Surgical History:     Past Medical History:   Diagnosis Date    Hypertension     Osteoporosis      No past surgical history on file  Family History:     Family History   Problem Relation Age of Onset    Hypertension Father       Social History:     Social History     Socioeconomic History    Marital status:      Spouse name: None    Number of children: None    Years of education: None    Highest education level: None   Occupational History    None   Tobacco Use    Smoking status: Former Smoker     Packs/day: 0 50     Types: Cigarettes    Smokeless tobacco: Former User    Tobacco comment: Patient reports she smoked for 20 years   Substance and Sexual Activity    Alcohol use:  Yes  Drug use: No    Sexual activity: Never   Other Topics Concern    None   Social History Narrative    None     Social Determinants of Health     Financial Resource Strain:     Difficulty of Paying Living Expenses:    Food Insecurity:     Worried About Running Out of Food in the Last Year:     [address] in the Last Year:    Transportation Needs:     Lack of Transportation (Medical):  Lack of Transportation (Non-Medical):    Physical Activity:     Days of Exercise per Week:     Minutes of Exercise per Session:    Stress:     Feeling of Stress :    Social Connections:     Frequency of Communication with Friends and Family:     Frequency of Social Gatherings with Friends and Family:     Attends [employer] Services:     Active Member of Clubs or Organizations:     Attends Club or Organization Meetings:     Marital Status:    Intimate Partner Violence:     Fear of Current or Ex-Partner:     Emotionally Abused:     Physically Abused:     Sexually Abused:       Medications and Allergies:     Current Outpatient Medications   Medication Sig Dispense Refill    celecoxib (CeleBREX) 100 mg capsule Take 1 capsule (100 mg total) by mouth 2 (two) times a day 90 capsule 3    HALOG 0 1 % CREA       omeprazole (PriLOSEC) 20 mg delayed release capsule Take 1 capsule (20 mg total) by mouth daily 90 capsule 3    rosuvastatin (CRESTOR) 10 MG tablet Take 1 tablet (10 mg total) by mouth daily 90 tablet 1    TRAVATAN Z 0 004 % ophthalmic solution        No current facility-administered medications for this visit       No Known Allergies   Immunizations:     Immunization History   Administered Date(s) Administered    INFLUENZA 10/14/2015, 10/14/2015, 10/24/2017, 10/24/2017    Influenza, high dose seasonal 0 7 mL 10/16/2018, 11/05/2019    Pneumococcal Conjugate 13-Valent 08/26/2015    Pneumococcal Polysaccharide PPV23 11/05/2019    SARS-CoV-2 / COVID-19 mRNA IM (Pfizer-BioNTech) 01/25/2021, 02/23/2021 Health Maintenance:         Topic Date Due    Colorectal Cancer Screening  02/16/2019    Breast Cancer Screening: Mammogram  10/27/2022         Topic Date Due    DTaP,Tdap,and Td Vaccines (1 - Tdap) Never done    Influenza Vaccine (1) 09/01/2021      Medicare Health Risk Assessment:     Ht 5' 3" (1 6 m)   BMI 29 23 kg/m²      Carlee Castillo is here for her Subsequent Wellness visit  Health Risk Assessment:   Patient rates overall health as very good  Patient feels that their physical health rating is same  Patient is satisfied with their life  Eyesight was rated as slightly worse  Hearing was rated as slightly worse  Patient feels that their emotional and mental health rating is same  Patients states they are never, rarely angry  Patient states they are never, rarely unusually tired/fatigued  Pain experienced in the last 7 days has been none  Patient states that she has experienced no weight loss or gain in last 6 months  Depression Screening:   PHQ-2 Score: 0      Fall Risk Screening: In the past year, patient has experienced: no history of falling in past year      Urinary Incontinence Screening:   Patient has not leaked urine accidently in the last six months  Home Safety:  Patient does not have trouble with stairs inside or outside of their home  Patient has working smoke alarms and has working carbon monoxide detector  Home safety hazards include: none  Medications:   Patient is not currently taking any over-the-counter supplements  Patient is able to manage medications  Activities of Daily Living (ADLs)/Instrumental Activities of Daily Living (IADLs):   Walk and transfer into and out of bed and chair?: Yes  Dress and groom yourself?: Yes    Bathe or shower yourself?: Yes    Feed yourself?  Yes  Do your laundry/housekeeping?: Yes  Manage your money, pay your bills and track your expenses?: Yes  Make your own meals?: Yes    Do your own shopping?: Yes    Previous Hospitalizations:   Any hospitalizations or ED visits within the last 12 months?: No      Advance Care Planning:   Living will: Yes    Advanced directive: Yes    Advanced directive counseling given: Yes      Cognitive Screening:   Provider or family/friend/caregiver concerned regarding cognition?: No    PREVENTIVE SCREENINGS      Cardiovascular Screening:    General: Screening Not Indicated and History Lipid Disorder      Diabetes Screening:     General: Screening Current      Colorectal Cancer Screening:     General: Screening Not Indicated      Breast Cancer Screening:     General: Screening Current      Cervical Cancer Screening:    General: Screening Not Indicated      Osteoporosis Screening:    General: Screening Not Indicated and History Osteoporosis      Abdominal Aortic Aneurysm (AAA) Screening:        General: Screening Not Indicated      Lung Cancer Screening:     General: Screening Not Indicated      Hepatitis C Screening:    General: Screening Not Indicated    Screening, Brief Intervention, and Referral to Treatment (SBIRT)    Screening      AUDIT-C Screenin) How often did you have a drink containing alcohol in the past year? 2 to 3 times a week  2) How many drinks did you have on a typical day when you were drinking in the past year? 1 to 2  3) How often did you have 6 or more drinks on one occasion in the past year? never    AUDIT-C Score: 3  Interpretation: Score 3-12 (female): POSITIVE screen for alcohol misuse    AUDIT Screenin) How often during the last year have you found that you were not able to stop drinking once you had started? 0 - never  5) How often during the last year have you failed to do what was normally expected from you because of drinking? 0 - never  6) How often during the last year have you needed a first drink in the morning to get yourself going after a heavy drinking session?  0 - never  7) How often during the last year have you had a feeling of guilt or remorse after drinking? 0 - never  8) How often during the last year have you been unable to remember what happened the night before because you had been drinking? 0 - never  9) Have you or someone else been injured as a result of your drinking? 0 - no  10) Has a relative or friend or a doctor or another health worker been concerned about your drinking or suggested you cut down? 0 - no    AUDIT Score: 3  Interpretation: Low risk alcohol consumption    Brief Intervention  Alcohol & drug use screenings were reviewed  No concerns regarding substance use disorder identified         Karuna Caban MD

## 2021-09-23 NOTE — PROGRESS NOTES
Assessment/Plan:       Chronic problems are stable  Continue present medications  Continue diet and exercise  Ordered labs for next visit  Quality Measures:       Return in about 6 months (around 3/23/2022)  No problem-specific Assessment & Plan notes found for this encounter  Diagnoses and all orders for this visit:    Medicare annual wellness visit, subsequent    Impaired fasting glucose  -     Hemoglobin A1C; Future    Mixed hyperlipidemia  -     Comprehensive metabolic panel; Future  -     CBC and differential; Future  -     Lipid panel; Future    GERD without esophagitis    Primary osteoarthritis involving multiple joints  -     celecoxib (CeleBREX) 100 mg capsule; Take 1 capsule (100 mg total) by mouth daily    Open-angle glaucoma, unspecified glaucoma stage, unspecified laterality, unspecified open-angle glaucoma type          Subjective:      Patient ID: Jennifer Pierson is a 80 y o  female  Patient comes in today for routine follow-up  She states she is doing well  Her blood pressure is controlled  Sugars controlled  Blood work also shows her cholesterol is controlled  She has arthritis but she is managing it  Reflux is controlled  Taking her medicines as directed  Watching her diet  No new complaints  She is vaccinated for COVID        ALLERGIES:  No Known Allergies    CURRENT MEDICATIONS:    Current Outpatient Medications:     HALOG 0 1 % CREA, , Disp: , Rfl:     omeprazole (PriLOSEC) 20 mg delayed release capsule, Take 1 capsule (20 mg total) by mouth daily, Disp: 90 capsule, Rfl: 3    rosuvastatin (CRESTOR) 10 MG tablet, Take 1 tablet (10 mg total) by mouth daily, Disp: 90 tablet, Rfl: 1    TRAVATAN Z 0 004 % ophthalmic solution, , Disp: , Rfl:     celecoxib (CeleBREX) 100 mg capsule, Take 1 capsule (100 mg total) by mouth daily, Disp: 90 capsule, Rfl: 3    ACTIVE PROBLEM LIST:  Patient Active Problem List   Diagnosis    Aortic stenosis, mild    DJD (degenerative joint disease), multiple sites    GERD without esophagitis    Glaucoma    Mixed hyperlipidemia    Impaired fasting glucose    Osteoporosis    Psoriasis    Precordial pain       PAST MEDICAL HISTORY:  Past Medical History:   Diagnosis Date    Hypertension     Osteoporosis        PAST SURGICAL HISTORY:  History reviewed  No pertinent surgical history  FAMILY HISTORY:  Family History   Problem Relation Age of Onset    Hypertension Father        SOCIAL HISTORY:  Social History     Socioeconomic History    Marital status:      Spouse name: Not on file    Number of children: Not on file    Years of education: Not on file    Highest education level: Not on file   Occupational History    Not on file   Tobacco Use    Smoking status: Former Smoker     Packs/day: 0 50     Types: Cigarettes    Smokeless tobacco: Former User    Tobacco comment: Patient reports she smoked for 20 years   Substance and Sexual Activity    Alcohol use: Yes    Drug use: No    Sexual activity: Never   Other Topics Concern    Not on file   Social History Narrative    Not on file     Social Determinants of Health     Financial Resource Strain:     Difficulty of Paying Living Expenses:    Food Insecurity:     Worried About Running Out of Food in the Last Year:     [address] in the Last Year:    Transportation Needs:     Lack of Transportation (Medical):      Lack of Transportation (Non-Medical):    Physical Activity:     Days of Exercise per Week:     Minutes of Exercise per Session:    Stress:     Feeling of Stress :    Social Connections:     Frequency of Communication with Friends and Family:     Frequency of Social Gatherings with Friends and Family:     Attends [employer] Services:     Active Member of Clubs or Organizations:     Attends Club or Organization Meetings:     Marital Status:    Intimate Partner Violence:     Fear of Current or Ex-Partner:     Emotionally Abused:     Physically Abused:  Sexually Abused:        Review of Systems   Respiratory: Negative for shortness of breath  Cardiovascular: Negative for chest pain  Gastrointestinal: Negative for abdominal pain  Objective:  Vitals:    09/23/21 1315   BP: 116/80   BP Location: Left arm   Patient Position: Sitting   Cuff Size: Adult   Pulse: 73   Resp: 18   Temp: 98 2 °F (36 8 °C)   TempSrc: Tympanic   SpO2: 93%   Weight: 75 kg (165 lb 6 4 oz)   Height: 5' 3" (1 6 m)     Body mass index is 29 3 kg/m²  Physical Exam  Vitals and nursing note reviewed  Constitutional:       Appearance: She is well-developed  Cardiovascular:      Rate and Rhythm: Normal rate and regular rhythm  Heart sounds: Normal heart sounds  Pulmonary:      Effort: Pulmonary effort is normal       Breath sounds: Normal breath sounds  Abdominal:      Palpations: Abdomen is soft  Tenderness: There is no abdominal tenderness  Neurological:      Mental Status: She is alert and oriented to person, place, and time             RESULTS:    Recent Results (from the past 1008 hour(s))   CBC and differential    Collection Time: 09/15/21  9:51 AM   Result Value Ref Range    WBC 4 27 (L) 4 31 - 10 16 Thousand/uL    RBC 3 91 3 81 - 5 12 Million/uL    Hemoglobin 12 2 11 5 - 15 4 g/dL    Hematocrit 39 2 34 8 - 46 1 %     (H) 82 - 98 fL    MCH 31 2 26 8 - 34 3 pg    MCHC 31 1 (L) 31 4 - 37 4 g/dL    RDW 14 1 11 6 - 15 1 %    MPV 11 6 8 9 - 12 7 fL    Platelets 598 885 - 774 Thousands/uL    nRBC 0 /100 WBCs    Neutrophils Relative 48 43 - 75 %    Immat GRANS % 1 0 - 2 %    Lymphocytes Relative 37 14 - 44 %    Monocytes Relative 9 4 - 12 %    Eosinophils Relative 4 0 - 6 %    Basophils Relative 1 0 - 1 %    Neutrophils Absolute 2 10 1 85 - 7 62 Thousands/µL    Immature Grans Absolute 0 02 0 00 - 0 20 Thousand/uL    Lymphocytes Absolute 1 56 0 60 - 4 47 Thousands/µL    Monocytes Absolute 0 39 0 17 - 1 22 Thousand/µL    Eosinophils Absolute 0 17 0 00 - 0 61 Thousand/µL    Basophils Absolute 0 03 0 00 - 0 10 Thousands/µL   Comprehensive metabolic panel    Collection Time: 09/15/21  9:51 AM   Result Value Ref Range    Sodium 136 136 - 145 mmol/L    Potassium 4 4 3 5 - 5 3 mmol/L    Chloride 104 100 - 108 mmol/L    CO2 30 21 - 32 mmol/L    ANION GAP 2 (L) 4 - 13 mmol/L    BUN 12 5 - 25 mg/dL    Creatinine 0 71 0 60 - 1 30 mg/dL    Glucose, Fasting 97 65 - 99 mg/dL    Calcium 9 3 8 3 - 10 1 mg/dL    AST 20 5 - 45 U/L    ALT 21 12 - 78 U/L    Alkaline Phosphatase 67 46 - 116 U/L    Total Protein 7 6 6 4 - 8 2 g/dL    Albumin 3 8 3 5 - 5 0 g/dL    Total Bilirubin 0 54 0 20 - 1 00 mg/dL    eGFR 90 ml/min/1 73sq m   Lipid panel    Collection Time: 09/15/21  9:51 AM   Result Value Ref Range    Cholesterol 206 (H) 50 - 200 mg/dL    Triglycerides 86 <=150 mg/dL    HDL, Direct 110 >=40 mg/dL    LDL Calculated 79 0 - 100 mg/dL    Non-HDL-Chol (CHOL-HDL) 96 mg/dl   Hemoglobin A1C    Collection Time: 09/15/21  9:51 AM   Result Value Ref Range    Hemoglobin A1C 6 1 (H) Normal 3 8-5 6%; PreDiabetic 5 7-6 4%; Diabetic >=6 5%; Glycemic control for adults with diabetes <7 0% %     mg/dl       This note was created with voice recognition software  Phonic, grammatical and spelling errors may be present within the note as a result

## 2021-10-11 DIAGNOSIS — E78.2 MIXED HYPERLIPIDEMIA: ICD-10-CM

## 2021-10-11 RX ORDER — ROSUVASTATIN CALCIUM 10 MG/1
TABLET, COATED ORAL
Qty: 90 TABLET | Refills: 3 | Status: SHIPPED | OUTPATIENT
Start: 2021-10-11 | End: 2022-03-31 | Stop reason: SDUPTHER

## 2021-12-09 ENCOUNTER — ESTABLISHED COMPREHENSIVE EXAM (OUTPATIENT)
Dept: URBAN - METROPOLITAN AREA CLINIC 6 | Facility: CLINIC | Age: 86
End: 2021-12-09

## 2021-12-09 DIAGNOSIS — H40.1132: ICD-10-CM

## 2021-12-09 PROCEDURE — 92012 INTRM OPH EXAM EST PATIENT: CPT

## 2021-12-09 PROCEDURE — 92020 GONIOSCOPY: CPT

## 2021-12-09 PROCEDURE — 1036F TOBACCO NON-USER: CPT

## 2021-12-09 PROCEDURE — G8427 DOCREV CUR MEDS BY ELIG CLIN: HCPCS

## 2021-12-09 PROCEDURE — 92083 EXTENDED VISUAL FIELD XM: CPT

## 2021-12-09 ASSESSMENT — TONOMETRY
OD_IOP_MMHG: 27
OS_IOP_MMHG: 28

## 2021-12-09 ASSESSMENT — VISUAL ACUITY
OD_SC: 20/40-
OS_SC: 20/40-2

## 2022-01-04 ENCOUNTER — TELEPHONE (OUTPATIENT)
Dept: INTERNAL MEDICINE CLINIC | Facility: CLINIC | Age: 87
End: 2022-01-04

## 2022-01-04 DIAGNOSIS — R25.2 LEG CRAMPS: ICD-10-CM

## 2022-01-04 DIAGNOSIS — E78.2 MIXED HYPERLIPIDEMIA: Primary | ICD-10-CM

## 2022-01-04 DIAGNOSIS — R73.01 IMPAIRED FASTING GLUCOSE: ICD-10-CM

## 2022-01-04 NOTE — TELEPHONE ENCOUNTER
Dr Roman [facility] Ms Waunita Bouton called in with c/o leg cramping no other symptoms  No medicstion changes  She wants to know if you can janene order a cmp  Is this ok?

## 2022-01-05 ENCOUNTER — APPOINTMENT (OUTPATIENT)
Dept: LAB | Age: 87
End: 2022-01-05
Payer: MEDICARE

## 2022-01-05 DIAGNOSIS — E78.2 MIXED HYPERLIPIDEMIA: ICD-10-CM

## 2022-01-05 DIAGNOSIS — R73.01 IMPAIRED FASTING GLUCOSE: ICD-10-CM

## 2022-01-05 DIAGNOSIS — R25.2 LEG CRAMPS: ICD-10-CM

## 2022-01-05 LAB
ALBUMIN SERPL BCP-MCNC: 4.4 G/DL (ref 3.5–5)
ALP SERPL-CCNC: 71 U/L (ref 46–116)
ALT SERPL W P-5'-P-CCNC: 24 U/L (ref 12–78)
ANION GAP SERPL CALCULATED.3IONS-SCNC: 5 MMOL/L (ref 4–13)
AST SERPL W P-5'-P-CCNC: 19 U/L (ref 5–45)
BASOPHILS # BLD AUTO: 0.04 THOUSANDS/ΜL (ref 0–0.1)
BASOPHILS NFR BLD AUTO: 1 % (ref 0–1)
BILIRUB SERPL-MCNC: 1.28 MG/DL (ref 0.2–1)
BUN SERPL-MCNC: 14 MG/DL (ref 5–25)
CALCIUM SERPL-MCNC: 10.3 MG/DL (ref 8.3–10.1)
CHLORIDE SERPL-SCNC: 104 MMOL/L (ref 100–108)
CHOLEST SERPL-MCNC: 278 MG/DL
CO2 SERPL-SCNC: 30 MMOL/L (ref 21–32)
CREAT SERPL-MCNC: 0.73 MG/DL (ref 0.6–1.3)
EOSINOPHIL # BLD AUTO: 0.16 THOUSAND/ΜL (ref 0–0.61)
EOSINOPHIL NFR BLD AUTO: 4 % (ref 0–6)
ERYTHROCYTE [DISTWIDTH] IN BLOOD BY AUTOMATED COUNT: 13.9 % (ref 11.6–15.1)
GFR SERPL CREATININE-BSD FRML MDRD: 74 ML/MIN/1.73SQ M
GLUCOSE P FAST SERPL-MCNC: 87 MG/DL (ref 65–99)
HCT VFR BLD AUTO: 43.2 % (ref 34.8–46.1)
HDLC SERPL-MCNC: 155 MG/DL
HGB BLD-MCNC: 13.6 G/DL (ref 11.5–15.4)
IMM GRANULOCYTES # BLD AUTO: 0.01 THOUSAND/UL (ref 0–0.2)
IMM GRANULOCYTES NFR BLD AUTO: 0 % (ref 0–2)
LDLC SERPL CALC-MCNC: 104 MG/DL (ref 0–100)
LYMPHOCYTES # BLD AUTO: 1.66 THOUSANDS/ΜL (ref 0.6–4.47)
LYMPHOCYTES NFR BLD AUTO: 41 % (ref 14–44)
MCH RBC QN AUTO: 31.5 PG (ref 26.8–34.3)
MCHC RBC AUTO-ENTMCNC: 31.5 G/DL (ref 31.4–37.4)
MCV RBC AUTO: 100 FL (ref 82–98)
MONOCYTES # BLD AUTO: 0.44 THOUSAND/ΜL (ref 0.17–1.22)
MONOCYTES NFR BLD AUTO: 11 % (ref 4–12)
NEUTROPHILS # BLD AUTO: 1.7 THOUSANDS/ΜL (ref 1.85–7.62)
NEUTS SEG NFR BLD AUTO: 43 % (ref 43–75)
NONHDLC SERPL-MCNC: 123 MG/DL
NRBC BLD AUTO-RTO: 0 /100 WBCS
PLATELET # BLD AUTO: 232 THOUSANDS/UL (ref 149–390)
PMV BLD AUTO: 10.7 FL (ref 8.9–12.7)
POTASSIUM SERPL-SCNC: 4.3 MMOL/L (ref 3.5–5.3)
PROT SERPL-MCNC: 8.3 G/DL (ref 6.4–8.2)
RBC # BLD AUTO: 4.32 MILLION/UL (ref 3.81–5.12)
SODIUM SERPL-SCNC: 139 MMOL/L (ref 136–145)
TRIGL SERPL-MCNC: 96 MG/DL
WBC # BLD AUTO: 4.01 THOUSAND/UL (ref 4.31–10.16)

## 2022-01-05 PROCEDURE — 85025 COMPLETE CBC W/AUTO DIFF WBC: CPT

## 2022-01-05 PROCEDURE — 83036 HEMOGLOBIN GLYCOSYLATED A1C: CPT

## 2022-01-05 PROCEDURE — 80061 LIPID PANEL: CPT

## 2022-01-05 PROCEDURE — 36415 COLL VENOUS BLD VENIPUNCTURE: CPT

## 2022-01-05 PROCEDURE — 80053 COMPREHEN METABOLIC PANEL: CPT

## 2022-01-07 LAB
EST. AVERAGE GLUCOSE BLD GHB EST-MCNC: 134 MG/DL
HBA1C MFR BLD: 6.3 %

## 2022-01-10 ENCOUNTER — TELEPHONE (OUTPATIENT)
Dept: INTERNAL MEDICINE CLINIC | Facility: CLINIC | Age: 87
End: 2022-01-10

## 2022-01-10 NOTE — TELEPHONE ENCOUNTER
Labs were about the same except the cholesterol shot up  Unless she changed what she is doing with her cholesterol medicine, it was probably from the holidays

## 2022-01-10 NOTE — TELEPHONE ENCOUNTER
Pt asking for stiven to call her back on lab results done wed?      And to talk about prolia     Call today after 330 she asks or rohit afternoon

## 2022-01-12 NOTE — TELEPHONE ENCOUNTER
Patient is wondering if the labs showed anything that would be a reason for her recent leg cramping? Thanks!

## 2022-01-12 NOTE — TELEPHONE ENCOUNTER
No, electrolytes were okay  She should make sure she is well hydrated  If it mainly is happening at night, it is probably arthritis in her back  It then pinch is a nerve

## 2022-01-14 NOTE — TELEPHONE ENCOUNTER
Spoke with the patient and made her aware of what Dr Yamini Fuller has said  She verbalized understanding  She will follow up with us on 3/31

## 2022-02-07 ENCOUNTER — PROCEDURE ONLY (OUTPATIENT)
Dept: URBAN - METROPOLITAN AREA CLINIC 6 | Facility: CLINIC | Age: 87
End: 2022-02-07

## 2022-02-07 VITALS — SYSTOLIC BLOOD PRESSURE: 178 MMHG | HEART RATE: 68 BPM | DIASTOLIC BLOOD PRESSURE: 99 MMHG | HEIGHT: 55 IN

## 2022-02-07 DIAGNOSIS — H40.1132: ICD-10-CM

## 2022-02-07 PROCEDURE — 65855 TRABECULOPLASTY LASER SURG: CPT

## 2022-02-07 ASSESSMENT — VISUAL ACUITY
OU_CC: J1+1
OS_SC: 20/40-1
OD_SC: 20/60-1

## 2022-02-08 ENCOUNTER — 1 DAY POST-OP (OUTPATIENT)
Dept: URBAN - METROPOLITAN AREA CLINIC 6 | Facility: CLINIC | Age: 87
End: 2022-02-08

## 2022-02-08 DIAGNOSIS — H40.1132: ICD-10-CM

## 2022-02-08 PROCEDURE — 99024 POSTOP FOLLOW-UP VISIT: CPT

## 2022-02-08 ASSESSMENT — VISUAL ACUITY
OS_SC: 20/40-2
OD_SC: 20/70+1

## 2022-02-08 ASSESSMENT — TONOMETRY
OD_IOP_MMHG: 15
OS_IOP_MMHG: 21

## 2022-02-15 ENCOUNTER — TELEPHONE (OUTPATIENT)
Dept: INTERNAL MEDICINE CLINIC | Facility: CLINIC | Age: 87
End: 2022-02-15

## 2022-02-15 NOTE — TELEPHONE ENCOUNTER
Pt is asking about an update on what supplements she should be taking currently?      She gave me these:    Magnesium   tumaric  Occu jade   Glucosimine calcium  Biotin   Fish Oil   Vit D3   cinnamon     Also a topical Vit D for siriosis     She also is on celebrex she says       Please advise with patient what she should be on and reflect med list of id think

## 2022-02-24 ENCOUNTER — FOLLOW UP (OUTPATIENT)
Dept: URBAN - METROPOLITAN AREA CLINIC 6 | Facility: CLINIC | Age: 87
End: 2022-02-24

## 2022-02-24 DIAGNOSIS — H40.1132: ICD-10-CM

## 2022-02-24 PROCEDURE — 65855 TRABECULOPLASTY LASER SURG: CPT

## 2022-02-24 ASSESSMENT — VISUAL ACUITY
OS_SC: 20/50+2
OD_SC: 20/80-2

## 2022-02-24 ASSESSMENT — TONOMETRY: OD_IOP_MMHG: 15

## 2022-03-21 ENCOUNTER — FOLLOW UP (OUTPATIENT)
Dept: URBAN - METROPOLITAN AREA CLINIC 6 | Facility: CLINIC | Age: 87
End: 2022-03-21

## 2022-03-21 DIAGNOSIS — H40.1132: ICD-10-CM

## 2022-03-21 PROCEDURE — 92012 INTRM OPH EXAM EST PATIENT: CPT

## 2022-03-21 ASSESSMENT — TONOMETRY
OS_IOP_MMHG: 19
OD_IOP_MMHG: 17

## 2022-03-21 ASSESSMENT — VISUAL ACUITY
OD_CC: 20/80
OU_SC: J3
OS_CC: 20/50

## 2022-03-25 ENCOUNTER — APPOINTMENT (OUTPATIENT)
Dept: LAB | Age: 87
End: 2022-03-25
Payer: MEDICARE

## 2022-03-25 DIAGNOSIS — R73.01 IMPAIRED FASTING GLUCOSE: ICD-10-CM

## 2022-03-25 DIAGNOSIS — E78.5 HYPERLIPIDEMIA, UNSPECIFIED HYPERLIPIDEMIA TYPE: ICD-10-CM

## 2022-03-25 LAB
ALBUMIN SERPL BCP-MCNC: 4 G/DL (ref 3.5–5)
ALP SERPL-CCNC: 59 U/L (ref 46–116)
ALT SERPL W P-5'-P-CCNC: 24 U/L (ref 12–78)
ANION GAP SERPL CALCULATED.3IONS-SCNC: 2 MMOL/L (ref 4–13)
AST SERPL W P-5'-P-CCNC: 22 U/L (ref 5–45)
BASOPHILS # BLD AUTO: 0.05 THOUSANDS/ΜL (ref 0–0.1)
BASOPHILS NFR BLD AUTO: 1 % (ref 0–1)
BILIRUB SERPL-MCNC: 0.52 MG/DL (ref 0.2–1)
BUN SERPL-MCNC: 11 MG/DL (ref 5–25)
CALCIUM SERPL-MCNC: 9.3 MG/DL (ref 8.3–10.1)
CHLORIDE SERPL-SCNC: 106 MMOL/L (ref 100–108)
CHOLEST SERPL-MCNC: 199 MG/DL
CO2 SERPL-SCNC: 30 MMOL/L (ref 21–32)
CREAT SERPL-MCNC: 0.8 MG/DL (ref 0.6–1.3)
EOSINOPHIL # BLD AUTO: 0.17 THOUSAND/ΜL (ref 0–0.61)
EOSINOPHIL NFR BLD AUTO: 4 % (ref 0–6)
ERYTHROCYTE [DISTWIDTH] IN BLOOD BY AUTOMATED COUNT: 13 % (ref 11.6–15.1)
EST. AVERAGE GLUCOSE BLD GHB EST-MCNC: 131 MG/DL
GFR SERPL CREATININE-BSD FRML MDRD: 66 ML/MIN/1.73SQ M
GLUCOSE P FAST SERPL-MCNC: 110 MG/DL (ref 65–99)
HBA1C MFR BLD: 6.2 %
HCT VFR BLD AUTO: 38.4 % (ref 34.8–46.1)
HDLC SERPL-MCNC: 115 MG/DL
HGB BLD-MCNC: 12.8 G/DL (ref 11.5–15.4)
IMM GRANULOCYTES # BLD AUTO: 0.01 THOUSAND/UL (ref 0–0.2)
IMM GRANULOCYTES NFR BLD AUTO: 0 % (ref 0–2)
LDLC SERPL CALC-MCNC: 67 MG/DL (ref 0–100)
LYMPHOCYTES # BLD AUTO: 1.75 THOUSANDS/ΜL (ref 0.6–4.47)
LYMPHOCYTES NFR BLD AUTO: 40 % (ref 14–44)
MCH RBC QN AUTO: 31.5 PG (ref 26.8–34.3)
MCHC RBC AUTO-ENTMCNC: 33.3 G/DL (ref 31.4–37.4)
MCV RBC AUTO: 95 FL (ref 82–98)
MONOCYTES # BLD AUTO: 0.45 THOUSAND/ΜL (ref 0.17–1.22)
MONOCYTES NFR BLD AUTO: 10 % (ref 4–12)
NEUTROPHILS # BLD AUTO: 1.91 THOUSANDS/ΜL (ref 1.85–7.62)
NEUTS SEG NFR BLD AUTO: 45 % (ref 43–75)
NONHDLC SERPL-MCNC: 84 MG/DL
NRBC BLD AUTO-RTO: 0 /100 WBCS
PLATELET # BLD AUTO: 210 THOUSANDS/UL (ref 149–390)
PMV BLD AUTO: 10.3 FL (ref 8.9–12.7)
POTASSIUM SERPL-SCNC: 4.1 MMOL/L (ref 3.5–5.3)
PROT SERPL-MCNC: 7.1 G/DL (ref 6.4–8.2)
RBC # BLD AUTO: 4.06 MILLION/UL (ref 3.81–5.12)
SODIUM SERPL-SCNC: 138 MMOL/L (ref 136–145)
TRIGL SERPL-MCNC: 84 MG/DL
WBC # BLD AUTO: 4.34 THOUSAND/UL (ref 4.31–10.16)

## 2022-03-25 PROCEDURE — 85025 COMPLETE CBC W/AUTO DIFF WBC: CPT

## 2022-03-25 PROCEDURE — 36415 COLL VENOUS BLD VENIPUNCTURE: CPT

## 2022-03-25 PROCEDURE — 83036 HEMOGLOBIN GLYCOSYLATED A1C: CPT

## 2022-03-25 PROCEDURE — 80061 LIPID PANEL: CPT

## 2022-03-25 PROCEDURE — 80053 COMPREHEN METABOLIC PANEL: CPT

## 2022-03-31 ENCOUNTER — OFFICE VISIT (OUTPATIENT)
Dept: INTERNAL MEDICINE CLINIC | Facility: CLINIC | Age: 87
End: 2022-03-31
Payer: MEDICARE

## 2022-03-31 VITALS
HEIGHT: 63 IN | DIASTOLIC BLOOD PRESSURE: 80 MMHG | BODY MASS INDEX: 29.59 KG/M2 | SYSTOLIC BLOOD PRESSURE: 116 MMHG | HEART RATE: 68 BPM | OXYGEN SATURATION: 96 % | WEIGHT: 167 LBS | RESPIRATION RATE: 18 BRPM | TEMPERATURE: 97.4 F

## 2022-03-31 DIAGNOSIS — M81.0 AGE-RELATED OSTEOPOROSIS WITHOUT CURRENT PATHOLOGICAL FRACTURE: ICD-10-CM

## 2022-03-31 DIAGNOSIS — R73.01 IMPAIRED FASTING GLUCOSE: ICD-10-CM

## 2022-03-31 DIAGNOSIS — E78.2 MIXED HYPERLIPIDEMIA: Primary | ICD-10-CM

## 2022-03-31 DIAGNOSIS — K21.9 GERD WITHOUT ESOPHAGITIS: ICD-10-CM

## 2022-03-31 PROCEDURE — 96372 THER/PROPH/DIAG INJ SC/IM: CPT | Performed by: INTERNAL MEDICINE

## 2022-03-31 PROCEDURE — 99214 OFFICE O/P EST MOD 30 MIN: CPT | Performed by: INTERNAL MEDICINE

## 2022-03-31 RX ORDER — ROSUVASTATIN CALCIUM 10 MG/1
10 TABLET, COATED ORAL DAILY
Qty: 90 TABLET | Refills: 3 | Status: SHIPPED | OUTPATIENT
Start: 2022-03-31

## 2022-03-31 RX ORDER — OMEPRAZOLE 20 MG/1
20 CAPSULE, DELAYED RELEASE ORAL DAILY
Qty: 90 CAPSULE | Refills: 3 | Status: SHIPPED | OUTPATIENT
Start: 2022-03-31

## 2022-03-31 NOTE — PROGRESS NOTES
Assessment/Plan:       Chronic problems are stable  Continue present medications  Continue diet and exercise  Ordered labs for next visit  Quality Measures:       Return in about 6 months (around 9/30/2022) for Regular visit and Medicare Wellness  No problem-specific Assessment & Plan notes found for this encounter  Diagnoses and all orders for this visit:    Mixed hyperlipidemia  -     rosuvastatin (CRESTOR) 10 MG tablet; Take 1 tablet (10 mg total) by mouth daily  -     Comprehensive metabolic panel; Future  -     CBC and differential; Future  -     Lipid panel; Future    Impaired fasting glucose  -     Hemoglobin A1C; Future    Age-related osteoporosis without current pathological fracture    GERD without esophagitis  -     omeprazole (PriLOSEC) 20 mg delayed release capsule; Take 1 capsule (20 mg total) by mouth daily          Subjective:      Patient ID: Jovon James is a 80 y o  female  Patient comes in today for routine follow-up  She states she is doing well with no new complaints  Her blood work is overall improved  Sugar is down further  Still borderline  Cholesterol is controlled  Much better last time  Her weight is steady  Trying to watch her diet  Blood pressure is controlled  Reflux is controlled  She is going to get her Prolia shot today  Trying to keep active  Denies any new complaints today  No further additions to her history        ALLERGIES:  No Known Allergies    CURRENT MEDICATIONS:    Current Outpatient Medications:     celecoxib (CeleBREX) 100 mg capsule, Take 1 capsule (100 mg total) by mouth daily, Disp: 90 capsule, Rfl: 3    HALOG 0 1 % CREA, , Disp: , Rfl:     omeprazole (PriLOSEC) 20 mg delayed release capsule, Take 1 capsule (20 mg total) by mouth daily, Disp: 90 capsule, Rfl: 3    rosuvastatin (CRESTOR) 10 MG tablet, Take 1 tablet (10 mg total) by mouth daily, Disp: 90 tablet, Rfl: 3    TRAVATAN Z 0 004 % ophthalmic solution, , Disp: , Rfl: ACTIVE PROBLEM LIST:  Patient Active Problem List   Diagnosis    Aortic stenosis, mild    DJD (degenerative joint disease), multiple sites    GERD without esophagitis    Glaucoma    Mixed hyperlipidemia    Impaired fasting glucose    Osteoporosis    Psoriasis    Precordial pain       PAST MEDICAL HISTORY:  Past Medical History:   Diagnosis Date    Hypertension     Osteoporosis        PAST SURGICAL HISTORY:  History reviewed  No pertinent surgical history  FAMILY HISTORY:  Family History   Problem Relation Age of Onset    Hypertension Father        SOCIAL HISTORY:  Social History     Socioeconomic History    Marital status:      Spouse name: Not on file    Number of children: Not on file    Years of education: Not on file    Highest education level: Not on file   Occupational History    Not on file   Tobacco Use    Smoking status: Former Smoker     Packs/day: 0 50     Types: Cigarettes    Smokeless tobacco: Former User    Tobacco comment: Patient reports she smoked for 20 years   Substance and Sexual Activity    Alcohol use: Yes    Drug use: No    Sexual activity: Never   Other Topics Concern    Not on file   Social History Narrative    Not on file     Social Determinants of Health     Financial Resource Strain: Not on file   Food Insecurity: Not on file   Transportation Needs: Not on file   Physical Activity: Not on file   Stress: Not on file   Social Connections: Not on file   Intimate Partner Violence: Not on file   Housing Stability: Not on file       Review of Systems   Respiratory: Negative for shortness of breath  Cardiovascular: Negative for chest pain  Gastrointestinal: Negative for abdominal pain           Objective:  Vitals:    03/31/22 1309   BP: 116/80   BP Location: Left arm   Patient Position: Sitting   Cuff Size: Adult   Pulse: 68   Resp: 18   Temp: (!) 97 4 °F (36 3 °C)   TempSrc: Tympanic   SpO2: 96%   Weight: 75 8 kg (167 lb)   Height: 5' 3" (1 6 m) Body mass index is 29 58 kg/m²  Physical Exam  Vitals and nursing note reviewed  Constitutional:       Appearance: She is well-developed  Cardiovascular:      Rate and Rhythm: Normal rate and regular rhythm  Heart sounds: Normal heart sounds  Pulmonary:      Effort: Pulmonary effort is normal       Breath sounds: Normal breath sounds  Abdominal:      Palpations: Abdomen is soft  Tenderness: There is no abdominal tenderness  Neurological:      Mental Status: She is alert and oriented to person, place, and time             RESULTS:    Recent Results (from the past 1008 hour(s))   Lipid panel    Collection Time: 03/25/22  9:40 AM   Result Value Ref Range    Cholesterol 199 See Comment mg/dL    Triglycerides 84 See Comment mg/dL    HDL, Direct 115 >=50 mg/dL    LDL Calculated 67 0 - 100 mg/dL    Non-HDL-Chol (CHOL-HDL) 84 mg/dl   Comprehensive metabolic panel    Collection Time: 03/25/22  9:40 AM   Result Value Ref Range    Sodium 138 136 - 145 mmol/L    Potassium 4 1 3 5 - 5 3 mmol/L    Chloride 106 100 - 108 mmol/L    CO2 30 21 - 32 mmol/L    ANION GAP 2 (L) 4 - 13 mmol/L    BUN 11 5 - 25 mg/dL    Creatinine 0 80 0 60 - 1 30 mg/dL    Glucose, Fasting 110 (H) 65 - 99 mg/dL    Calcium 9 3 8 3 - 10 1 mg/dL    AST 22 5 - 45 U/L    ALT 24 12 - 78 U/L    Alkaline Phosphatase 59 46 - 116 U/L    Total Protein 7 1 6 4 - 8 2 g/dL    Albumin 4 0 3 5 - 5 0 g/dL    Total Bilirubin 0 52 0 20 - 1 00 mg/dL    eGFR 66 ml/min/1 73sq m   CBC and differential    Collection Time: 03/25/22  9:40 AM   Result Value Ref Range    WBC 4 34 4 31 - 10 16 Thousand/uL    RBC 4 06 3 81 - 5 12 Million/uL    Hemoglobin 12 8 11 5 - 15 4 g/dL    Hematocrit 38 4 34 8 - 46 1 %    MCV 95 82 - 98 fL    MCH 31 5 26 8 - 34 3 pg    MCHC 33 3 31 4 - 37 4 g/dL    RDW 13 0 11 6 - 15 1 %    MPV 10 3 8 9 - 12 7 fL    Platelets 652 038 - 505 Thousands/uL    nRBC 0 /100 WBCs    Neutrophils Relative 45 43 - 75 %    Immat GRANS % 0 0 - 2 % Lymphocytes Relative 40 14 - 44 %    Monocytes Relative 10 4 - 12 %    Eosinophils Relative 4 0 - 6 %    Basophils Relative 1 0 - 1 %    Neutrophils Absolute 1 91 1 85 - 7 62 Thousands/µL    Immature Grans Absolute 0 01 0 00 - 0 20 Thousand/uL    Lymphocytes Absolute 1 75 0 60 - 4 47 Thousands/µL    Monocytes Absolute 0 45 0 17 - 1 22 Thousand/µL    Eosinophils Absolute 0 17 0 00 - 0 61 Thousand/µL    Basophils Absolute 0 05 0 00 - 0 10 Thousands/µL   Hemoglobin A1C    Collection Time: 03/25/22  9:40 AM   Result Value Ref Range    Hemoglobin A1C 6 2 (H) Normal 3 8-5 6%; PreDiabetic 5 7-6 4%; Diabetic >=6 5%; Glycemic control for adults with diabetes <7 0% %     mg/dl       This note was created with voice recognition software  Phonic, grammatical and spelling errors may be present within the note as a result

## 2022-03-31 NOTE — ADDENDUM NOTE
Addended by: Monik Parkinson on: 3/31/2022 02:10 PM     Modules accepted: Orders
endobronchial brushings, washing and biopsy of left lower lobe; left subsegmental lymph nodes bx

## 2022-07-11 ENCOUNTER — ESTABLISHED COMPREHENSIVE EXAM (OUTPATIENT)
Dept: URBAN - METROPOLITAN AREA CLINIC 6 | Facility: CLINIC | Age: 87
End: 2022-07-11

## 2022-07-11 DIAGNOSIS — H40.1132: ICD-10-CM

## 2022-07-11 DIAGNOSIS — H26.491: ICD-10-CM

## 2022-07-11 DIAGNOSIS — Z96.1: ICD-10-CM

## 2022-07-11 PROCEDURE — 92014 COMPRE OPH EXAM EST PT 1/>: CPT

## 2022-07-11 PROCEDURE — 92133 CPTRZD OPH DX IMG PST SGM ON: CPT

## 2022-07-11 PROCEDURE — 92202 OPSCPY EXTND ON/MAC DRAW: CPT

## 2022-07-11 ASSESSMENT — VISUAL ACUITY
OD_CC: CF 2FT
OS_SC: 20/40-2
OU_CC: J3
OD_SC: CF 2FT

## 2022-07-11 ASSESSMENT — TONOMETRY
OD_IOP_MMHG: 16
OS_IOP_MMHG: 19

## 2022-08-10 ENCOUNTER — SURGERY/PROCEDURE (OUTPATIENT)
Dept: URBAN - METROPOLITAN AREA SURGICAL CENTER 6 | Facility: SURGICAL CENTER | Age: 87
End: 2022-08-10

## 2022-08-10 DIAGNOSIS — Z96.1: ICD-10-CM

## 2022-08-10 DIAGNOSIS — H26.491: ICD-10-CM

## 2022-08-10 PROCEDURE — 66821 AFTER CATARACT LASER SURGERY: CPT

## 2022-08-29 ENCOUNTER — 1 WEEK POST-OP (OUTPATIENT)
Dept: URBAN - METROPOLITAN AREA CLINIC 6 | Facility: CLINIC | Age: 87
End: 2022-08-29

## 2022-08-29 ENCOUNTER — SURGERY/PROCEDURE (OUTPATIENT)
Dept: URBAN - METROPOLITAN AREA CLINIC 6 | Facility: CLINIC | Age: 87
End: 2022-08-29

## 2022-08-29 DIAGNOSIS — Z96.1: ICD-10-CM

## 2022-08-29 PROCEDURE — 99024 POSTOP FOLLOW-UP VISIT: CPT

## 2022-08-29 ASSESSMENT — TONOMETRY
OD_IOP_MMHG: 17
OS_IOP_MMHG: 17

## 2022-08-29 ASSESSMENT — VISUAL ACUITY
OS_PH: 20/40-2
OS_SC: 20/40-2
OD_SC: CF 4FT

## 2022-09-07 NOTE — PROGRESS NOTES
[address] [facility] Department of Endocrinology Diabetes and Metabolism   [address] [facility] [address]   Phone: [phone]  Fax: [phone]    Date of Service: 9/7/2022    Primary Care Physician: HAIDER Elmore - CNP  Referring physician: Ameena Griffith CNP  Provider: HAIDER Peters     Reason for the visit:  abnormal hba1c       History of Present Illness: The history is provided by the patient. No  was used. Accuracy of the patient data is excellent. Wesley Villatoro is a very pleasant 21 y.o. female seen today for Evaluation for diabetes     Pt had hba1c 5.7% in 5/2022   Context weight loss but stable recently and has gained approx to 6 pounds since 5/2022  Denies polyuria, polydipsia  Insulin level 6, C-peptide 1.1, fast glucose 84, TSH 1.99 (6/2022)  Denies family hx of diabetes              PAST MEDICAL HISTORY   Past Medical History:   Diagnosis Date    Anemia        PAST SURGICAL HISTORY   No past surgical history on file. SOCIAL HISTORY   Tobacco:   reports that she has never smoked. She has been exposed to tobacco smoke. She has never used smokeless tobacco.  Alcohol:   reports no history of alcohol use. Drugs:   reports current drug use. Drug: Marijuana Darryle Pimple). FAMILY HISTORY   Family History   Problem Relation Age of Onset    High Blood Pressure Mother     Thyroid Disease Mother     Diabetes type 2  Maternal Grandfather        ALLERGIES AND DRUG REACTIONS   No Known Allergies    CURRENT MEDICATIONS   Current Outpatient Medications   Medication Sig Dispense Refill    MedroxyPROGESTERone Acetate (DEPO-PROVERA IM) Inject into the muscle      polyethylene glycol (MIRALAX) 17 GM/SCOOP powder Use once daily as needed for constipation. (Patient not taking: Reported on 9/7/2022) 1 Bottle 0     No current facility-administered medications for this visit.        Review of Systems  Constitutional: No fever, no chills, no Daily Note     Today's date: 2019  Patient name: Jennifer Pierson  : 1935  MRN: [id number]  Referring provider: Debbi Betancourt DO  Dx:   Encounter Diagnosis     ICD-10-CM    1  Acute pain of right knee M25 561    2  Arthritis of knee, right M17 11                   Subjective: Pt noted only discomfort at R knee today  Objective: See treatment diary below      Assessment: No pain with progression today  Plan: Continue per plan of care        Precautions: Patient's PMHx is remarkable for GERD without esophagitis, Aortic stenosis, mild, DJD (degenerative joint disease), multiple sites, Osteoporosis, Psoriasis, Glaucoma, and HTN      Manual              Knee PROM                                                                     Exercise Diary  8/26 8/30 9/3 9/5 9/9 9/11 9/12      Quad sets w/ ankle prop 20x5" HEP  20x 5sec          Heel slides with strap 10x10" HEP  NT                        bike  8 min 10 min  10 min 10'  10 min       gastroc stretch with strap:B:  20 sec x 5 20sec 5x  5x 20 sec  5x20" 10 min       HS stretch with strap:B:  20 sec x 5 20sec 5x  20sec 5x  5x20"  20sec 5x       Prone Quad stretch with strap  Add to HEP 20sec 5x     20sec 5x       Hip iso add B    20 3sec  20x 3sec  np  20x 5sec      SLR:B:  2 x 10 20x  20x  2#, 10x ea  20x 2#       bridging  3 sec x 20 20x 3sec  20x 3sec  20x  20x 3sec       SAQ:B:  3 sec x 20 20x 2#  20x 2#  20x ea, 2#  20x 2#       clamshells  Bend knee fall outs x 20 on each le 20x 3sec 20x 3sec with TB RTB  20x ea, RTB  20x 3sec  BTB       Prone hip ext  2 x 10 20x 2#  20x 2# 20x, 2#  20x 2#       Prone flex    20x 2#  20x 2# 20x, 2#  20x 2#       sidestepping   NT  NT    NT       Anterior step ups   NT  NT    NT       3-way hip kick   NT     20x 2#       Mini-squats   20x  20x  20x  20x       HR   NT     30x                                        Modalities              Ice PRN diaphoresis, no generalized weakness. HEENT: No blurred vision, No sore throat, no ear pain, no hair loss  Neck: denied any neck swelling, difficulty swallowing,   Cardio-pulmonary: No CP, SOB or palpitation, No orthopnea or PND. No cough or wheezing. GI: No N/V/D, no constipation, No abdominal pain, no melena or hematochezia   : Denied any dysuria, hematuria, flank pain, discharge, or incontinence. Skin: denied any rash, ulcer, Hirsute, or hyperpigmentation. MSK: denied any joint deformity, joint pain/swelling, muscle pain, or back pain. Neuro: no numbness, no tingling, no weakness, _    OBJECTIVE    /84   Pulse 77   Ht 5' 2\" (1.575 m)   Wt 103 lb (46.7 kg)   SpO2 94%   BMI 18.84 kg/m²   BP Readings from Last 4 Encounters:   09/07/22 125/84   06/26/21 125/72   06/26/21 125/79   05/20/21 (!) 130/90     Wt Readings from Last 6 Encounters:   09/07/22 103 lb (46.7 kg)   06/26/21 105 lb (47.6 kg) (9 %, Z= -1.37)*   06/26/21 105 lb (47.6 kg) (9 %, Z= -1.37)*   05/20/21 (!) 95 lb (43.1 kg) (1 %, Z= -2.27)*   11/26/18 108 lb (49 kg) (22 %, Z= -0.78)*   05/04/18 103 lb (46.7 kg) (16 %, Z= -1.01)*     * Growth percentiles are based on CDC (Girls, 2-20 Years) data. Physical examination:  General: awake alert, oriented x3, no abnormal position or movements. HEENT: normocephalic non-traumatic, no exophthalmos   Neck: supple, no LN enlargement, no thyromegaly, no thyroid tenderness, no JVD. Pulm: Clear equal air entry no added sounds, no wheezing or rhonchi    CVS: S1 + S2, no murmur, no heave. Dorsalis pedis pulse palpable   Abd: soft lax, no tenderness, no organomegaly, audible bowel sounds. Skin: warm, no lesions, no rash.  No callus, no Ulcers, No acanthosis nigricans  Musculoskeletal: No back tenderness, no kyphosis/scoliosis    Neuro: CN intact,  , muscle power normal  Psych: normal mood, and affect      Review of Laboratory Data:  I personally reviewed the following lab:  Lab Results Component Value Date/Time    WBC 9.9 06/27/2021 01:00 AM    RBC 4.95 06/27/2021 01:00 AM    HGB 13.2 06/27/2021 01:00 AM    HCT 41.0 06/27/2021 01:00 AM    MCV 82.8 06/27/2021 01:00 AM    MCH 26.7 06/27/2021 01:00 AM    MCHC 32.2 06/27/2021 01:00 AM    RDW 13.7 06/27/2021 01:00 AM     06/27/2021 01:00 AM    MPV 9.3 06/27/2021 01:00 AM      Lab Results   Component Value Date/Time     06/27/2021 01:00 AM    K 3.7 06/27/2021 01:00 AM    CO2 22 06/27/2021 01:00 AM    BUN 11 06/27/2021 01:00 AM    CREATININE 1.0 06/27/2021 01:00 AM    CALCIUM 9.8 06/27/2021 01:00 AM    LABGLOM >60 06/27/2021 01:00 AM    GFRAA >60 06/27/2021 01:00 AM      No results found for: TSH, T4FREE, U7GCYOI, FT3, Q4EQZSL, TSI, TPOABS, THGAB  Lab Results   Component Value Date/Time    LABA1C 5.2 09/07/2022 08:58 AM    GLUCOSE 146 06/27/2021 01:00 AM     Lab Results   Component Value Date/Time    LABA1C 5.2 09/07/2022 08:58 AM     No results found for: TRIG, HDL, LDLCALC, CHOL  No results found for: VITD25    ASSESSMENT & RECOMMENDATIONS   Oswaldo Woodson, a 21 y.o.-old female seen in for the following issues       Assessment:      Diagnosis Orders   1. Abnormal glucose  POCT glycosylated hemoglobin (Hb A1C)      2. Vitamin D deficiency        3. Weight loss            Plan:     1. Abnormal glucose   Repeat Hba1c 5.2%   Previous labs reviewed   I do not see evidence of diabetes  Counseled on symptoms of hyperglycemia including polyuria, polydipsia, polyphagia and in severe cases nausea vomiting and abdominal pain  Pt verbalized understanding        2. Vitamin D deficiency  Continue vitamin D supplementation      3. Weight loss   TSH WNL   Following with dietician  Has gained 6 pounds since 5/22     I personally spent > 45 minutes reviewing external notes from PCP and other patient's care team providers, and personally interpreted labs associated with the above diagnosis.  I also ordered labs to further assess and manage the above addressed medical conditions. Return if symptoms worsen or fail to improve. The above issues were reviewed with the patient who understood and agreed with the plan. Thank you for allowing us to participate in the care of this patient. Please do not hesitate to contact us with any additional questions. HAIDER Vidal     [facility] Diabetes Care and Endocrinology   [address] [facility] [address]   Phone: [phone]  Fax: [phone]  --------------------------------------------  An electronic signature was used to authenticate this note.  HAIDER Vidal on 9/7/2022 at 9:15 AM

## 2022-09-08 ENCOUNTER — EVALUATION (OUTPATIENT)
Dept: PHYSICAL THERAPY | Age: 87
End: 2022-09-08
Payer: MEDICARE

## 2022-09-08 DIAGNOSIS — M25.512 CHRONIC LEFT SHOULDER PAIN: Primary | ICD-10-CM

## 2022-09-08 DIAGNOSIS — G89.29 CHRONIC LEFT SHOULDER PAIN: Primary | ICD-10-CM

## 2022-09-08 PROCEDURE — 97140 MANUAL THERAPY 1/> REGIONS: CPT | Performed by: PHYSICAL THERAPIST

## 2022-09-08 PROCEDURE — 97162 PT EVAL MOD COMPLEX 30 MIN: CPT | Performed by: PHYSICAL THERAPIST

## 2022-09-08 PROCEDURE — 97110 THERAPEUTIC EXERCISES: CPT | Performed by: PHYSICAL THERAPIST

## 2022-09-08 NOTE — LETTER
2022    Paulino Pendleton DO  [address] [facility]    Patient: Rikki Bonilla   YOB: 1935   Date of Visit: 2022     Encounter Diagnosis     ICD-10-CM    1  Chronic left shoulder pain  M25 512     G89 29        Dear Dr Geraldo Matias:    Thank you for your recent referral of Rikki Bonilla  Please review the attached evaluation summary from Dori's recent visit  Please verify that you agree with the plan of care by signing the attached order  If you have any questions or concerns, please do not hesitate to call  I sincerely appreciate the opportunity to share in the care of one of your patients and hope to have another opportunity to work with you in the near future  Sincerely,    Kathleen Matthews, PT      Referring Provider:      I certify that I have read the below Plan of Care and certify the need for these services furnished under this plan of treatment while under my care  Paulino Pendleton DO  [id number]  Via Fax: [phone]          PT Evaluation     Today's date: 2022  Patient name: Rikki Bonilla  : 1935  MRN: [id number]  Referring provider: Edu Eric DO  Dx:   Encounter Diagnosis     ICD-10-CM    1  Chronic left shoulder pain  M25 512     G89 29                   Assessment  Assessment details: PT IE: 2022  Patient reported onset of left shoulder pain initially occurred last year  Patient noted she had a Cortizone injection in 2021 and another one on 2022 which had not effect  Patient noted she landed against the wall and aggravated it  Patient noted she has the following deficits due to left shoulder pain: reaching, lifting, lowering items from and elevated, donning and doffing her clothing, reaching back to dress and wash her back, left side lying  Patient noted sitting will reduce her pain  Patient noted she does have intermittent aching pain  Patient noted the following factors reduce her pain: heat, pain patch and OTC pain medication  Patient noted she is right ue dominant  Patient noted the pain location is superior and anterior left GH joint region  Patient denies left ue paresthesias  Patient noted x-ray was - for bony involvement  Patient noted left ue feels weak as well  Patient reported using right ue to lower left ue during lifting activities in pain reducing  Impairments: abnormal or restricted ROM, abnormal movement, activity intolerance, lacks appropriate home exercise program and pain with function  Understanding of Dx/Px/POC: excellent   Prognosis: good  Prognosis details: Patient is a 80y o  year old female seen for outpatient PT evaluation with left shoulder pain due to bursitis and RTC tendinopathy  Patient presents to PT IE with the following problems, concerns, deficits and impairments: left shoulder pain, decreased left ue range of motion, decreased left ue strength, + TTP, + special tests, decrease in postural awareness, functional limitations and decreased tolerance to activity  Patient would benefit from skilled PT services under the following PT treatment plan to address the above noted deficits: therapeutic exercises and activities to facilitate left ue rom and strength, postural reeducation and strengthening, modalities, manual therapy techniques, IASTM technique, Kinesio taping techniques, and a hep  Thank you for the referral      Goals  Short Term goals - 4 weeks  1  Patient will be independent HEP  2   Patient will report a 25 - 50% decrease in pain complaints  3   Increase strength 1/2 grade  4   Increase ROM 5-10 degrees  Long Term goals - 8 weeks  1  Patient will report elimination of pain complaints  2   Patient will return to all recreational activities without restriction  3   ROM WFL    4   Strength 5/5   5   Patient will report reaching is improved by > 50 % prior to left shoulder symptoms and limitations  6   Patient will report house hold lifting activities are improved by > 50 % prior to left shoulder symptoms and limitations  7   Patient will report ability to lower house hold activities like dishes and cups are improved by > 50 % prior to left shoulder symptoms and limitations  8   Patient will report ability to don and doff her clothing with > 50 % improvement prior to left shoulder symptoms and limitations  9   Patient will report ability to wash and dress herself with > 50 % improvement prior to left shoulder symptoms and limitations  Plan  Patient would benefit from: skilled physical therapy  Planned modality interventions: cryotherapy, TENS, thermotherapy: hydrocollator packs, ultrasound and unattended electrical stimulation  Planned therapy interventions: joint mobilization, manual therapy, massage, balance, balance/weight bearing training, neuromuscular re-education, patient education, body mechanics training, self care, strengthening, stretching, therapeutic activities, therapeutic exercise, therapeutic training, transfer training, flexibility, functional ROM exercises, gait training, graded activity, graded exercise, graded motor, home exercise program and compression  Frequency: 2x week  Duration in weeks: 8  Treatment plan discussed with: patient        Subjective Evaluation    History of Present Illness  Mechanism of injury: recautions: Patient's PMHx is remarkable for GERD without esophagitis, Aortic stenosis, mild, DJD (degenerative joint disease), multiple sites, Osteoporosis, Psoriasis, Glaucoma, and HTN    Pain  At best pain ratin  At worst pain ratin  Location: Superior and Anterior Left shoulder    Patient Goals  Patient goals for therapy: decreased pain, increased motion, increased strength, independence with ADLs/IADLs and return to sport/leisure activities          Objective     Tenderness     Additional Tenderness Details  Patient is + TTP at moderate to severe levels at left posterior acromion process  Patient exhibits protracted cervical spine posture at minimal to moderate levels  Active Range of Motion   Left Shoulder   Flexion: 138 degrees with pain  Abduction: 128 degrees with pain  External rotation 90°: 38 degrees with pain  Internal rotation 90°: 18 degrees with pain    Right Shoulder   Flexion: 162 degrees   Abduction: 154 degrees   External rotation 90°: 64 degrees  Internal rotation 90°: 52 degrees     Strength/Myotome Testing     Left Shoulder     Planes of Motion   Flexion: 4-   Abduction: 4-   External rotation at 0°: 3+   Internal rotation at 0°: 4-     Right Shoulder     Planes of Motion   Flexion: 4   Abduction: 4   External rotation at 0°: 4   Internal rotation at 0°: 4     Left Elbow   Flexion: 4-  Extension: 4    Right Elbow   Flexion: 4+  Extension: 4+    Tests     Left Shoulder   Positive empty can, Hawkin's and painful arc                Precautions: Patient's PMHx is remarkable for GERD without esophagitis, Aortic stenosis, mild, DJD (degenerative joint disease), multiple sites, Osteoporosis, Psoriasis, Glaucoma, and HTN      Manuals 9/8            Kinesio taping to left [address] joint in a "Y" pattern with base of "Y" at deltoid tuberosity and tails at anterior and posterior deltoid musculature with tails at 50 % tension and an additional "I" strip perpendicular to the base of the "Y" with a diagonal upward force at 50 % tension 15 min                                                   Neuro Re-Ed                                                                                                        Ther Ex                          Pulleys             Corner pec stretch at 45 degrees:B:             Scapular squeezes:B: 10 x             Seated postural correction slough over correct 10 x             Seated table slides:L flexion and scaption:L 10 x             Seated shoulder ER stretch with wedge:L             Cervical spine retraction Standing shoulder scaption and flexion:B: 10 x             Biceps curls:B:             Shoulder abduction with spc:L             Shoulder ER with spc:L                          Jackson rows and extension:B:                          Supine shoulder flexion with bench press             Supine scap punches and triceps extension:L             Right side lying left shoulder ER and abduction                          Prone shoulder extension, horizontal abduction and scaption:L                                                                                                        Ther Activity                                       Gait Training                                       Modalities             MHP to left GH joint while seated PRN

## 2022-09-08 NOTE — PROGRESS NOTES
PT Evaluation     Today's date: 2022  Patient name: Chanda Torrez  : 1935  MRN: [id number]  Referring provider: Susanna Medina DO  Dx:   Encounter Diagnosis     ICD-10-CM    1  Chronic left shoulder pain  M25 512     G89 29                   Assessment  Assessment details: PT IE: 2022  Patient reported onset of left shoulder pain initially occurred last year  Patient noted she had a Cortizone injection in 2021 and another one on 2022 which had not effect  Patient noted she landed against the wall and aggravated it  Patient noted she has the following deficits due to left shoulder pain: reaching, lifting, lowering items from and elevated, donning and doffing her clothing, reaching back to dress and wash her back, left side lying  Patient noted sitting will reduce her pain  Patient noted she does have intermittent aching pain  Patient noted the following factors reduce her pain: heat, pain patch and OTC pain medication  Patient noted she is right ue dominant  Patient noted the pain location is superior and anterior left GH joint region  Patient denies left ue paresthesias  Patient noted x-ray was - for bony involvement  Patient noted left ue feels weak as well  Patient reported using right ue to lower left ue during lifting activities in pain reducing  Impairments: abnormal or restricted ROM, abnormal movement, activity intolerance, lacks appropriate home exercise program and pain with function  Understanding of Dx/Px/POC: excellent   Prognosis: good  Prognosis details: Patient is a 80y o  year old female seen for outpatient PT evaluation with left shoulder pain due to bursitis and RTC tendinopathy   Patient presents to PT IE with the following problems, concerns, deficits and impairments: left shoulder pain, decreased left ue range of motion, decreased left ue strength, + TTP, + special tests, decrease in postural awareness, functional limitations and decreased tolerance to activity  Patient would benefit from skilled PT services under the following PT treatment plan to address the above noted deficits: therapeutic exercises and activities to facilitate left ue rom and strength, postural reeducation and strengthening, modalities, manual therapy techniques, IASTM technique, Kinesio taping techniques, and a hep  Thank you for the referral      Goals  Short Term goals - 4 weeks  1  Patient will be independent HEP  2   Patient will report a 25 - 50% decrease in pain complaints  3   Increase strength 1/2 grade  4   Increase ROM 5-10 degrees  Long Term goals - 8 weeks  1  Patient will report elimination of pain complaints  2   Patient will return to all recreational activities without restriction  3   ROM WFL  4   Strength 5/5   5   Patient will report reaching is improved by > 50 % prior to left shoulder symptoms and limitations  6   Patient will report house hold lifting activities are improved by > 50 % prior to left shoulder symptoms and limitations  7   Patient will report ability to lower house hold activities like dishes and cups are improved by > 50 % prior to left shoulder symptoms and limitations  8   Patient will report ability to don and doff her clothing with > 50 % improvement prior to left shoulder symptoms and limitations  9   Patient will report ability to wash and dress herself with > 50 % improvement prior to left shoulder symptoms and limitations      Plan  Patient would benefit from: skilled physical therapy  Planned modality interventions: cryotherapy, TENS, thermotherapy: hydrocollator packs, ultrasound and unattended electrical stimulation  Planned therapy interventions: joint mobilization, manual therapy, massage, balance, balance/weight bearing training, neuromuscular re-education, patient education, body mechanics training, self care, strengthening, stretching, therapeutic activities, therapeutic exercise, therapeutic training, transfer training, flexibility, functional ROM exercises, gait training, graded activity, graded exercise, graded motor, home exercise program and compression  Frequency: 2x week  Duration in weeks: 8  Treatment plan discussed with: patient        Subjective Evaluation    History of Present Illness  Mechanism of injury: recautions: Patient's PMHx is remarkable for GERD without esophagitis, Aortic stenosis, mild, DJD (degenerative joint disease), multiple sites, Osteoporosis, Psoriasis, Glaucoma, and HTN  Pain  At best pain ratin  At worst pain ratin  Location: Superior and Anterior Left shoulder    Patient Goals  Patient goals for therapy: decreased pain, increased motion, increased strength, independence with ADLs/IADLs and return to sport/leisure activities          Objective     Tenderness     Additional Tenderness Details  Patient is + TTP at moderate to severe levels at left posterior acromion process  Patient exhibits protracted cervical spine posture at minimal to moderate levels  Active Range of Motion   Left Shoulder   Flexion: 138 degrees with pain  Abduction: 128 degrees with pain  External rotation 90°: 38 degrees with pain  Internal rotation 90°: 18 degrees with pain    Right Shoulder   Flexion: 162 degrees   Abduction: 154 degrees   External rotation 90°: 64 degrees  Internal rotation 90°: 52 degrees     Strength/Myotome Testing     Left Shoulder     Planes of Motion   Flexion: 4-   Abduction: 4-   External rotation at 0°: 3+   Internal rotation at 0°: 4-     Right Shoulder     Planes of Motion   Flexion: 4   Abduction: 4   External rotation at 0°: 4   Internal rotation at 0°: 4     Left Elbow   Flexion: 4-  Extension: 4    Right Elbow   Flexion: 4+  Extension: 4+    Tests     Left Shoulder   Positive empty can, Hawkin's and painful arc                Precautions: Patient's PMHx is remarkable for GERD without esophagitis, Aortic stenosis, mild, DJD (degenerative joint disease), multiple sites, Osteoporosis, Psoriasis, Glaucoma, and HTN      Manuals 9/8            Kinesio taping to left [facility] joint in a "Y" pattern with base of "Y" at deltoid tuberosity and tails at anterior and posterior deltoid musculature with tails at 50 % tension and an additional "I" strip perpendicular to the base of the "Y" with a diagonal upward force at 50 % tension 15 min                                                   Neuro Re-Ed                                                                                                        Ther Ex                          Pulleys             Corner pec stretch at 45 degrees:B:             Scapular squeezes:B: 10 x             Seated postural correction slough over correct 10 x             Seated table slides:L flexion and scaption:L 10 x             Seated shoulder ER stretch with wedge:L             Cervical spine retraction                          Standing shoulder scaption and flexion:B: 10 x             Biceps curls:B:             Shoulder abduction with spc:L             Shoulder ER with spc:L                          [place] rows and extension:B:                          Supine shoulder flexion with bench press             Supine scap punches and triceps extension:L             Right side lying left shoulder ER and abduction                          Prone shoulder extension, horizontal abduction and scaption:L                                                                                                        Ther Activity                                       Gait Training                                       Modalities             MHP to left GH joint while seated PRN

## 2022-09-13 ENCOUNTER — OFFICE VISIT (OUTPATIENT)
Dept: PHYSICAL THERAPY | Age: 87
End: 2022-09-13
Payer: MEDICARE

## 2022-09-13 DIAGNOSIS — M25.512 CHRONIC LEFT SHOULDER PAIN: Primary | ICD-10-CM

## 2022-09-13 DIAGNOSIS — G89.29 CHRONIC LEFT SHOULDER PAIN: Primary | ICD-10-CM

## 2022-09-13 PROCEDURE — 97110 THERAPEUTIC EXERCISES: CPT

## 2022-09-13 NOTE — PROGRESS NOTES
Daily Note     Today's date: 2022  Patient name: Octaviano Ivey  : 1935  MRN: [id number]  Referring provider: Trupti Roman DO  Dx:   Encounter Diagnosis     ICD-10-CM    1  Chronic left shoulder pain  M25 512     G89 29                   Subjective: Pt noted 8/10 pain at L shoulder this am        Objective: See treatment diary below      Assessment: Pt tolerated session fairly well fatigue and weakness noted with exercises         Plan: Cont with Plan of care      Precautions: Patient's PMHx is remarkable for GERD without esophagitis, Aortic stenosis, mild, DJD (degenerative joint disease), multiple sites, Osteoporosis, Psoriasis, Glaucoma, and HTN      Manuals            Kinesio taping to left [address] joint in a "Y" pattern with base of "Y" at deltoid tuberosity and tails at anterior and posterior deltoid musculature with tails at 50 % tension and an additional "I" strip perpendicular to the base of the "Y" with a diagonal upward force at 50 % tension 15 min MAURILIO                         Neuro Re-Ed                                                    Ther Ex                          Pulleys  5 min            Corner pec stretch at 45 degrees:B:  10x 10sec            Scapular squeezes:B: 10 x  20x 3sec            Seated postural correction slough over correct 10 x  20x           Seated table slides:L flexion and scaption:L 10 x  20x 5sec            Seated shoulder ER stretch with wedge:L  10x 5sec            Cervical spine retraction  20x                         Standing shoulder scaption and flexion:B: 10 x  20x            Biceps curls:B:  20x 1#           Shoulder abduction with spc:L  NT           Shoulder ER with spc:L  10X 5SEC                         [place] rows and extension:B:  8# 20X                         Supine shoulder flexion with bench press  20X            Supine scap punches and triceps extension:L  20X            Right side lying left shoulder ER and abduction  20X Prone shoulder extension, horizontal abduction and scaption:L  NT                                                                                                      Ther Activity                                       Gait Training                                       Modalities             MHP to left GH joint while seated PRN

## 2022-09-15 ENCOUNTER — OFFICE VISIT (OUTPATIENT)
Dept: PHYSICAL THERAPY | Age: 87
End: 2022-09-15
Payer: MEDICARE

## 2022-09-15 DIAGNOSIS — G89.29 CHRONIC LEFT SHOULDER PAIN: Primary | ICD-10-CM

## 2022-09-15 DIAGNOSIS — M25.512 CHRONIC LEFT SHOULDER PAIN: Primary | ICD-10-CM

## 2022-09-15 PROCEDURE — 97110 THERAPEUTIC EXERCISES: CPT | Performed by: PHYSICAL THERAPIST

## 2022-09-15 NOTE — PROGRESS NOTES
Daily Note     Today's date: 9/15/2022  Patient name: Warren Colon  : 1935  MRN: [id number]  Referring provider: Damari Sinclair DO  Dx:   Encounter Diagnosis     ICD-10-CM    1  Chronic left shoulder pain  M25 512     G89 29                   Subjective: Pt noted 7/10 pain at Left shoulder this am, with patient noting putting tops on are extremely difficult due to left shoulder pain aggravation  Objective: See treatment diary below  Assessment: Patient presents with left [facility] joint mobility improved since onset of PT and compliance of daily hep  But, all left GH joint elevation based functional activities continue to exhibit limitations into elevation  Patient continues to require manual and verbal cues for proper technique of therapeutic exercises  Thus, PT is warranted to facilitate left GH joint pain reduction, mobility and strength improvements to progress to full functional status without left GH joint pain limitations         Plan: Cont with Plan of care      Precautions: Patient's PMHx is remarkable for GERD without esophagitis, Aortic stenosis, mild, DJD (degenerative joint disease), multiple sites, Osteoporosis, Psoriasis, Glaucoma, and HTN      Manuals 9/8 9/13 9/15          Kinesio taping to left [facility] joint in a "Y" pattern with base of "Y" at deltoid tuberosity and tails at anterior and posterior deltoid musculature with tails at 50 % tension and an additional "I" strip perpendicular to the base of the "Y" with a diagonal upward force at 50 % tension 15 min MAURILIO  NT since still on since last PT visit                       Neuro Re-Ed                                                    Ther Ex                          Nu step   10 min          Pulleys  5 min  5 min          Corner pec stretch at 45 degrees:B:  10x 10sec  10 sec x 10          Scapular squeezes:B: 10 x  20x 3sec  3 sec x 20          Seated postural correction slough over correct 10 x  20x 3 sec  x20          Seated table slides:L flexion and scaption:L 10 x  20x 5sec  3 sec x 20          Seated shoulder ER stretch with wedge:L  10x 5sec  10 sec x 5          Cervical spine retraction  20x  2 x 10                       Standing shoulder scaption and flexion:B: 10 x  20x  1# x 20          Biceps curls:B:  20x 1# 2# x 20          Shoulder abduction with spc:L  NT NT          Shoulder ER with spc:L  10X 5SEC  NT                       Dagmar rows and extension:B:  8# 20X                         Supine shoulder flexion with bench press  20X  2# x 20          Supine scap punches and triceps extension:L  20X  1# x 20          Right side lying left shoulder ER and abduction  20X  2 x 10                       Prone shoulder extension, horizontal abduction and scaption:L  NT NT                                                                                                     Ther Activity                                       Gait Training                                       Modalities             MHP to left GH joint while seated PRN

## 2022-09-20 ENCOUNTER — OFFICE VISIT (OUTPATIENT)
Dept: PHYSICAL THERAPY | Age: 87
End: 2022-09-20
Payer: MEDICARE

## 2022-09-20 DIAGNOSIS — M25.512 CHRONIC LEFT SHOULDER PAIN: Primary | ICD-10-CM

## 2022-09-20 DIAGNOSIS — G89.29 CHRONIC LEFT SHOULDER PAIN: Primary | ICD-10-CM

## 2022-09-20 PROCEDURE — 97110 THERAPEUTIC EXERCISES: CPT | Performed by: PHYSICAL THERAPIST

## 2022-09-20 NOTE — PROGRESS NOTES
Daily Note     Today's date: 2022  Patient name: Drea Licea  : 1935  MRN: [id number]  Referring provider: Tamera Hinkle DO  Dx:   Encounter Diagnosis     ICD-10-CM    1  Chronic left shoulder pain  M25 512     G89 29                   Subjective: Patient noted severe pain limits taking tops off and putting them on, but she noted she is able to raise her left arm better to put on deodorant  Objective: See treatment diary below  Assessment: Patient presents with elevation based therapeutic exercises and activities limited by pain, weakness and fatigue, which mimics self functional activities  But, she is able to perform repeated active activities without pain aggravation but IR based functional movements continue to be limited by pain aggravation  Thus, PT is warranted to facilitate left GH joint pain reduction, mobility and strength improvements to progress to full functional status without left GH joint pain limitations         Plan: Cont with Plan of care      Precautions: Patient's PMHx is remarkable for GERD without esophagitis, Aortic stenosis, mild, DJD (degenerative joint disease), multiple sites, Osteoporosis, Psoriasis, Glaucoma, and HTN      Manuals 9/8 9/13 9/15 9/20         Kinesio taping to left [facility] joint in a "Y" pattern with base of "Y" at deltoid tuberosity and tails at anterior and posterior deltoid musculature with tails at 50 % tension and an additional "I" strip perpendicular to the base of the "Y" with a diagonal upward force at 50 % tension 15 min MAURILIO  NT since still on since last PT visit NT                      Neuro Re-Ed                                                    Ther Ex                          Nu step   10 min 10 min         Pulleys  5 min  5 min 5 min         Corner pec stretch at 45 degrees:B:  10x 10sec  10 sec x 10 10 sec x 10         Scapular squeezes:B: 10 x  20x 3sec  3 sec x 20 3 sec x 20         Seated postural correction slough over correct 10 x  20x 3 sec  x20 2 x 10         Seated table slides:L flexion and scaption:L 10 x  20x 5sec  3 sec x 20 2 x 10         Seated shoulder ER stretch with wedge:L  10x 5sec  10 sec x 5 10 sec x 10         Cervical spine retraction  20x  2 x 10 2 x 10                      Standing shoulder scaption and flexion:B: 10 x  20x  1# x 20 1# x 20         Biceps curls:B:  20x 1# 2# x 20 2# x 20         Shoulder abduction with spc:L  NT NT NT         Shoulder ER with spc:L  10X 5SEC  NT NT                      [place] rows and extension:B:  8# 20X   NT                      Supine shoulder flexion with bench press  20X  2# x 20 2# x 20         Supine scap punches and triceps extension:L  20X  1# x 20 1# x 20         Right side lying left shoulder ER and abduction  20X  2 x 10 2 x 10                      Prone shoulder extension, horizontal abduction and scaption:L  NT NT NT                                                                                                    Ther Activity                                       Gait Training                                       Modalities             MHP to left GH joint while seated PRN

## 2022-09-21 ENCOUNTER — APPOINTMENT (OUTPATIENT)
Dept: LAB | Age: 87
End: 2022-09-21
Payer: MEDICARE

## 2022-09-21 DIAGNOSIS — R73.01 IMPAIRED FASTING GLUCOSE: ICD-10-CM

## 2022-09-21 DIAGNOSIS — E78.2 MIXED HYPERLIPIDEMIA: ICD-10-CM

## 2022-09-21 LAB
ALBUMIN SERPL BCP-MCNC: 3.8 G/DL (ref 3.5–5)
ALP SERPL-CCNC: 54 U/L (ref 46–116)
ALT SERPL W P-5'-P-CCNC: 22 U/L (ref 12–78)
ANION GAP SERPL CALCULATED.3IONS-SCNC: 3 MMOL/L (ref 4–13)
AST SERPL W P-5'-P-CCNC: 20 U/L (ref 5–45)
BASOPHILS # BLD AUTO: 0.03 THOUSANDS/ΜL (ref 0–0.1)
BASOPHILS NFR BLD AUTO: 1 % (ref 0–1)
BILIRUB SERPL-MCNC: 0.72 MG/DL (ref 0.2–1)
BUN SERPL-MCNC: 14 MG/DL (ref 5–25)
CALCIUM SERPL-MCNC: 9.3 MG/DL (ref 8.3–10.1)
CHLORIDE SERPL-SCNC: 105 MMOL/L (ref 96–108)
CHOLEST SERPL-MCNC: 209 MG/DL
CO2 SERPL-SCNC: 30 MMOL/L (ref 21–32)
CREAT SERPL-MCNC: 0.79 MG/DL (ref 0.6–1.3)
EOSINOPHIL # BLD AUTO: 0.13 THOUSAND/ΜL (ref 0–0.61)
EOSINOPHIL NFR BLD AUTO: 3 % (ref 0–6)
ERYTHROCYTE [DISTWIDTH] IN BLOOD BY AUTOMATED COUNT: 14.6 % (ref 11.6–15.1)
EST. AVERAGE GLUCOSE BLD GHB EST-MCNC: 128 MG/DL
GFR SERPL CREATININE-BSD FRML MDRD: 67 ML/MIN/1.73SQ M
GLUCOSE P FAST SERPL-MCNC: 110 MG/DL (ref 65–99)
HBA1C MFR BLD: 6.1 %
HCT VFR BLD AUTO: 39.6 % (ref 34.8–46.1)
HDLC SERPL-MCNC: 133 MG/DL
HGB BLD-MCNC: 12.3 G/DL (ref 11.5–15.4)
IMM GRANULOCYTES # BLD AUTO: 0.01 THOUSAND/UL (ref 0–0.2)
IMM GRANULOCYTES NFR BLD AUTO: 0 % (ref 0–2)
LDLC SERPL CALC-MCNC: 59 MG/DL (ref 0–100)
LYMPHOCYTES # BLD AUTO: 1.79 THOUSANDS/ΜL (ref 0.6–4.47)
LYMPHOCYTES NFR BLD AUTO: 37 % (ref 14–44)
MCH RBC QN AUTO: 30.4 PG (ref 26.8–34.3)
MCHC RBC AUTO-ENTMCNC: 31.1 G/DL (ref 31.4–37.4)
MCV RBC AUTO: 98 FL (ref 82–98)
MONOCYTES # BLD AUTO: 0.56 THOUSAND/ΜL (ref 0.17–1.22)
MONOCYTES NFR BLD AUTO: 12 % (ref 4–12)
NEUTROPHILS # BLD AUTO: 2.29 THOUSANDS/ΜL (ref 1.85–7.62)
NEUTS SEG NFR BLD AUTO: 47 % (ref 43–75)
NONHDLC SERPL-MCNC: 76 MG/DL
NRBC BLD AUTO-RTO: 0 /100 WBCS
PLATELET # BLD AUTO: 213 THOUSANDS/UL (ref 149–390)
PMV BLD AUTO: 10.8 FL (ref 8.9–12.7)
POTASSIUM SERPL-SCNC: 4.5 MMOL/L (ref 3.5–5.3)
PROT SERPL-MCNC: 7.5 G/DL (ref 6.4–8.4)
RBC # BLD AUTO: 4.05 MILLION/UL (ref 3.81–5.12)
SODIUM SERPL-SCNC: 138 MMOL/L (ref 135–147)
TRIGL SERPL-MCNC: 86 MG/DL
WBC # BLD AUTO: 4.81 THOUSAND/UL (ref 4.31–10.16)

## 2022-09-21 PROCEDURE — 83036 HEMOGLOBIN GLYCOSYLATED A1C: CPT

## 2022-09-21 PROCEDURE — 80061 LIPID PANEL: CPT

## 2022-09-21 PROCEDURE — 36415 COLL VENOUS BLD VENIPUNCTURE: CPT

## 2022-09-21 PROCEDURE — 80053 COMPREHEN METABOLIC PANEL: CPT

## 2022-09-21 PROCEDURE — 85025 COMPLETE CBC W/AUTO DIFF WBC: CPT

## 2022-09-22 ENCOUNTER — OFFICE VISIT (OUTPATIENT)
Dept: PHYSICAL THERAPY | Age: 87
End: 2022-09-22
Payer: MEDICARE

## 2022-09-22 DIAGNOSIS — M25.512 CHRONIC LEFT SHOULDER PAIN: Primary | ICD-10-CM

## 2022-09-22 DIAGNOSIS — G89.29 CHRONIC LEFT SHOULDER PAIN: Primary | ICD-10-CM

## 2022-09-22 PROCEDURE — 97110 THERAPEUTIC EXERCISES: CPT | Performed by: PHYSICAL THERAPIST

## 2022-09-22 PROCEDURE — 97140 MANUAL THERAPY 1/> REGIONS: CPT | Performed by: PHYSICAL THERAPIST

## 2022-09-22 NOTE — PROGRESS NOTES
Daily Note     Today's date: 2022  Patient name: Latisha Newman  : 1935  MRN: [id number]  Referring provider: Merline Knack, DO  Dx:   Encounter Diagnosis     ICD-10-CM    1  Chronic left shoulder pain  M25 512     G89 29                   Subjective: Patient reported she has to compensate to flexed her body to don and doff her shirt due to left shoulder pain aggravation, which will increase to severe levels  Objective: See treatment diary below  Assessment: Patient presents with left [facility] joint elevation based therapeutic exercises limited by pain aggravation, which mimics self and house hold functional activities  Patient presents with painful arc as a limitation movement especially with elevation eccentric control  Patient presents with RTC tendonitis / Impingement syndrome type presentation with left GH joint symptoms  Thus, PT is warranted to facilitate left GH joint pain reduction, mobility and strength improvements to progress to full functional status without left GH joint pain limitations         Plan: Cont with Plan of care      Precautions: Patient's PMHx is remarkable for GERD without esophagitis, Aortic stenosis, mild, DJD (degenerative joint disease), multiple sites, Osteoporosis, Psoriasis, Glaucoma, and HTN      Manuals 9/8 9/13 9/15 9/20 9/22        Kinesio taping to left [facility] joint in a "Y" pattern with base of "Y" at deltoid tuberosity and tails at anterior and posterior deltoid musculature with tails at 50 % tension and an additional "I" strip perpendicular to the base of the "Y" with a diagonal upward force at 50 % tension 15 min MAURILIO  NT since still on since last PT visit NT 10 min                     Neuro Re-Ed                                                    Ther Ex                          Nu step   10 min 10 min 10 min        Pulleys  5 min  5 min 5 min 5 min        Corner pec stretch at 45 degrees:B:  10x 10sec  10 sec x 10 10 sec x 10 10 sec x 10 Scapular squeezes:B: 10 x  20x 3sec  3 sec x 20 3 sec x 20 3 sec x 20        Seated postural correction slough over correct 10 x  20x 3 sec  x20 2 x 10         Seated table slides:L flexion and scaption:L 10 x  20x 5sec  3 sec x 20 2 x 10 2 x 10        Seated shoulder ER stretch with wedge:L  10x 5sec  10 sec x 5 10 sec x 10 10 sec x 10        Cervical spine retraction  20x  2 x 10 2 x 10 2 x 10                     Standing shoulder scaption and flexion:B: 10 x  20x  1# x 20 1# x 20 1# x 20        Biceps curls:B:  20x 1# 2# x 20 2# x 20 2 5# x 20        Shoulder abduction with spc:L  NT NT NT NT        Shoulder ER with spc:L  10X 5SEC  NT NT NT                     [place]ag[place] rows and extension:B:  8# 20X   NT 8# x 20                     Supine shoulder flexion with bench press  20X  2# x 20 2# x 20 2# x 20        Supine scap punches and triceps extension:L  20X  1# x 20 1# x 20 1 5# x 20        Right side lying left shoulder ER and abduction  20X  2 x 10 2 x 10 NT                     Prone shoulder extension, horizontal abduction and scaption:L  NT NT NT NT                                                                                                   Ther Activity                                       Gait Training                                       Modalities             MHP to left GH joint while seated PRN

## 2022-09-27 ENCOUNTER — APPOINTMENT (OUTPATIENT)
Dept: PHYSICAL THERAPY | Age: 87
End: 2022-09-27
Payer: MEDICARE

## 2022-09-27 ENCOUNTER — OFFICE VISIT (OUTPATIENT)
Dept: INTERNAL MEDICINE CLINIC | Facility: CLINIC | Age: 87
End: 2022-09-27
Payer: MEDICARE

## 2022-09-27 VITALS
OXYGEN SATURATION: 99 % | RESPIRATION RATE: 14 BRPM | BODY MASS INDEX: 29.95 KG/M2 | DIASTOLIC BLOOD PRESSURE: 70 MMHG | HEART RATE: 68 BPM | SYSTOLIC BLOOD PRESSURE: 124 MMHG | HEIGHT: 63 IN | WEIGHT: 169 LBS

## 2022-09-27 DIAGNOSIS — R73.01 IMPAIRED FASTING GLUCOSE: ICD-10-CM

## 2022-09-27 DIAGNOSIS — K21.9 GERD WITHOUT ESOPHAGITIS: ICD-10-CM

## 2022-09-27 DIAGNOSIS — M81.0 AGE-RELATED OSTEOPOROSIS WITHOUT CURRENT PATHOLOGICAL FRACTURE: ICD-10-CM

## 2022-09-27 DIAGNOSIS — E78.2 MIXED HYPERLIPIDEMIA: ICD-10-CM

## 2022-09-27 DIAGNOSIS — M15.9 PRIMARY OSTEOARTHRITIS INVOLVING MULTIPLE JOINTS: ICD-10-CM

## 2022-09-27 DIAGNOSIS — Z00.00 MEDICARE ANNUAL WELLNESS VISIT, SUBSEQUENT: Primary | ICD-10-CM

## 2022-09-27 DIAGNOSIS — I35.0 AORTIC STENOSIS, MILD: ICD-10-CM

## 2022-09-27 PROCEDURE — 96372 THER/PROPH/DIAG INJ SC/IM: CPT | Performed by: INTERNAL MEDICINE

## 2022-09-27 PROCEDURE — G0439 PPPS, SUBSEQ VISIT: HCPCS | Performed by: INTERNAL MEDICINE

## 2022-09-27 PROCEDURE — 99214 OFFICE O/P EST MOD 30 MIN: CPT | Performed by: INTERNAL MEDICINE

## 2022-09-27 RX ORDER — BRIMONIDINE TARTRATE 0.1 %
DROPS OPHTHALMIC (EYE)
COMMUNITY
Start: 2022-07-11

## 2022-09-27 RX ORDER — OMEPRAZOLE 20 MG/1
20 CAPSULE, DELAYED RELEASE ORAL DAILY
Qty: 90 CAPSULE | Refills: 3 | Status: SHIPPED | OUTPATIENT
Start: 2022-09-27

## 2022-09-27 NOTE — PROGRESS NOTES
Assessment and Plan:     Problem List Items Addressed This Visit        Digestive    GERD without esophagitis    Relevant Medications    omeprazole (PriLOSEC) 20 mg delayed release capsule       Endocrine    Impaired fasting glucose    Relevant Orders    Hemoglobin A1C       Cardiovascular and Mediastinum    Aortic stenosis, mild    Relevant Orders    Echo complete w/ contrast if indicated       Musculoskeletal and Integument    DJD (degenerative joint disease), multiple sites    Osteoporosis    Relevant Medications    denosumab (PROLIA) subcutaneous injection 60 mg (Completed) (Start on 9/27/2022  2:15 PM)       Other    Mixed hyperlipidemia    Relevant Orders    Comprehensive metabolic panel    CBC and differential    Lipid panel      Other Visit Diagnoses     Medicare annual wellness visit, subsequent    -  Primary        Chronic problems are stable  Continue present medications  Continue diet and exercise  Ordered labs for next visit  Preventive health issues were discussed with patient, and age appropriate screening tests were ordered as noted in patient's After Visit Summary  Personalized health advice and appropriate referrals for health education or preventive services given if needed, as noted in patient's After Visit Summary  History of Present Illness:     Patient presents for a Medicare Wellness Visit      Patient comes in today for routine follow-up and Medicare wellness  She states that she is doing okay  Everything is about the same  Her cholesterol is fairly well controlled  She is tolerating the full dose of medicine now  She states the leg cramps are controlled with a magnesium supplement, allowing her to take the full dose of statin  Reflux is under control  Sugar remains borderline  Taking her medicines as directed  No new complaints today  No further additions to her history       Patient Care Team:  Royal Carlton MD as PCP - General     Review of Systems:     Review of Systems   Respiratory: Negative for shortness of breath  Cardiovascular: Negative for chest pain  Gastrointestinal: Negative for abdominal pain  Problem List:     Patient Active Problem List   Diagnosis    Aortic stenosis, mild    DJD (degenerative joint disease), multiple sites    GERD without esophagitis    Glaucoma    Mixed hyperlipidemia    Impaired fasting glucose    Osteoporosis    Psoriasis    Precordial pain      Past Medical and Surgical History:     Past Medical History:   Diagnosis Date    Hypertension     Osteoporosis      No past surgical history on file  Family History:     Family History   Problem Relation Age of Onset    Hypertension Father       Social History:     Social History     Socioeconomic History    Marital status:      Spouse name: Not on file    Number of children: Not on file    Years of education: Not on file    Highest education level: Not on file   Occupational History    Not on file   Tobacco Use    Smoking status: Former Smoker     Packs/day: 0 50     Types: Cigarettes    Smokeless tobacco: Former User    Tobacco comment: Patient reports she smoked for 20 years   Vaping Use    Vaping Use: Never used   Substance and Sexual Activity    Alcohol use: Yes    Drug use: No    Sexual activity: Not Currently   Other Topics Concern    Not on file   Social History Narrative    Not on file     Social Determinants of Health     Financial Resource Strain: Medium Risk    Difficulty of Paying Living Expenses: Somewhat hard   Food Insecurity: Not on file   Transportation Needs: Unmet Transportation Needs    Lack of Transportation (Medical):  Yes    Lack of Transportation (Non-Medical): Yes   Physical Activity: Not on file   Stress: Not on file   Social Connections: Not on file   Intimate Partner Violence: Not on file   Housing Stability: Not on file      Medications and Allergies:     Current Outpatient Medications   Medication Sig Dispense Refill    brimonidine (Alphagan P) 0 1 %       celecoxib (CeleBREX) 100 mg capsule Take 1 capsule (100 mg total) by mouth daily 90 capsule 3    HALOG 0 1 % CREA       omeprazole (PriLOSEC) 20 mg delayed release capsule Take 1 capsule (20 mg total) by mouth daily 90 capsule 3    rosuvastatin (CRESTOR) 10 MG tablet Take 1 tablet (10 mg total) by mouth daily 90 tablet 3    TRAVATAN Z 0 004 % ophthalmic solution        No current facility-administered medications for this visit  No Known Allergies   Immunizations:     Immunization History   Administered Date(s) Administered    COVID-19 PFIZER VACCINE 0 3 ML IM 01/25/2021, 02/23/2021, 10/01/2021    COVID-19 Pfizer Vac BIVALENT Donn-sucrose 12 Yr+ IM (BOOSTER ONLY) 09/22/2022    COVID-19 Pfizer vac (Donn-sucrose, gray cap) 12 yr+ IM 04/22/2022    INFLUENZA 10/14/2015, 10/14/2015, 10/24/2017, 10/24/2017, 10/14/2021    Influenza, high dose seasonal 0 7 mL 10/16/2018, 11/05/2019    Pneumococcal Conjugate 13-Valent 08/26/2015    Pneumococcal Polysaccharide PPV23 11/05/2019      Health Maintenance:         Topic Date Due    Colorectal Cancer Screening  02/16/2019    Breast Cancer Screening: Mammogram  10/27/2022         Topic Date Due    Influenza Vaccine (1) 09/01/2022      Medicare Screening Tests and Risk Assessments:     Elie Miner is here for her Subsequent Wellness visit  Health Risk Assessment:   Patient rates overall health as good  Patient feels that their physical health rating is same  Patient is satisfied with their life  Eyesight was rated as much worse  Hearing was rated as same  Patient feels that their emotional and mental health rating is same  Patients states they are never, rarely angry  Patient states they are never, rarely unusually tired/fatigued  Pain experienced in the last 7 days has been some  Patient's pain rating has been 3/10  Patient states that she has experienced no weight loss or gain in last 6 months       Depression Screening: PHQ-2 Score: 0      Fall Risk Screening: In the past year, patient has experienced: no history of falling in past year      Urinary Incontinence Screening:     [facility] comments:  Patient clarifies that it is her vision that interferes with driving at night  Home Safety:  Patient does not have trouble with stairs inside or outside of their home  Patient has working smoke alarms and has working carbon monoxide detector  Home safety hazards include: none  Nutrition:   Current diet is Regular  Medications:   Patient is not currently taking any over-the-counter supplements  Patient is able to manage medications  Activities of Daily Living (ADLs)/Instrumental Activities of Daily Living (IADLs):   Walk and transfer into and out of bed and chair?: Yes  Dress and groom yourself?: Yes    Bathe or shower yourself?: Yes    Feed yourself?  Yes  Do your laundry/housekeeping?: Yes  Manage your money, pay your bills and track your expenses?: Yes  Make your own meals?: Yes    Do your own shopping?: Yes    Previous Hospitalizations:   Any hospitalizations or ED visits within the last 12 months?: No      Advance Care Planning:   Living will: Yes    Advanced directive: Yes    Advanced directive counseling given: Yes      Cognitive Screening:   Provider or family/friend/caregiver concerned regarding cognition?: No    PREVENTIVE SCREENINGS      Cardiovascular Screening:    General: Screening Not Indicated and History Lipid Disorder      Diabetes Screening:     General: Screening Current      Colorectal Cancer Screening:     General: Screening Not Indicated      Breast Cancer Screening:     General: Screening Current      Cervical Cancer Screening:    General: Screening Not Indicated      Osteoporosis Screening:    General: Screening Not Indicated and History Osteoporosis      Abdominal Aortic Aneurysm (AAA) Screening:        General: Screening Not Indicated      Lung Cancer Screening:     General: Screening Not Indicated      Hepatitis C Screening:    General: Screening Not Indicated    Screening, Brief Intervention, and Referral to Treatment (SBIRT)    Screening  Typical number of drinks in a day: 2  Typical number of drinks in a week: 8  Interpretation: Low risk drinking behavior  AUDIT-C Screenin) How often did you have a drink containing alcohol in the past year? 4 or more times a week  2) How many drinks did you have on a typical day when you were drinking in the past year? 1 to 2  3) How often did you have 6 or more drinks on one occasion in the past year? never    AUDIT-C Score: 4  Interpretation: Score 3-12 (female): POSITIVE screen for alcohol misuse    AUDIT Screenin) How often during the last year have you found that you were not able to stop drinking once you had started? 0 - never  5) How often during the last year have you failed to do what was normally expected from you because of drinking? 0 - never  6) How often during the last year have you needed a first drink in the morning to get yourself going after a heavy drinking session? 0 - never  7) How often during the last year have you had a feeling of guilt or remorse after drinking? 0 - never  8) How often during the last year have you been unable to remember what happened the night before because you had been drinking? 0 - never  9) Have you or someone else been injured as a result of your drinking? 0 - no  10) Has a relative or friend or a doctor or another health worker been concerned about your drinking or suggested you cut down? 0 - no    AUDIT Score: 4  Interpretation: Low risk alcohol consumption    Single Item Drug Screening:  How often have you used an illegal drug (including marijuana) or a prescription medication for non-medical reasons in the past year? never    Single Item Drug Screen Score: 0  Interpretation: Negative screen for possible drug use disorder    Brief Intervention  Alcohol & drug use screenings were reviewed   No concerns regarding substance use disorder identified  No exam data present     Physical Exam:     /70 (BP Location: Left arm, Patient Position: Sitting)   Pulse 68   Resp 14   Ht 5' 3" (1 6 m)   Wt 76 7 kg (169 lb)   SpO2 99%   BMI 29 94 kg/m²     Physical Exam  Vitals and nursing note reviewed  Constitutional:       Appearance: She is well-developed  Cardiovascular:      Rate and Rhythm: Normal rate and regular rhythm  Pulmonary:      Effort: Pulmonary effort is normal       Breath sounds: Normal breath sounds  Abdominal:      General: Abdomen is flat  Tenderness: There is no abdominal tenderness  Musculoskeletal:      Right lower leg: No edema  Left lower leg: No edema  Neurological:      Mental Status: She is alert and oriented to person, place, and time  Psychiatric:         Behavior: Behavior normal          Thought Content:  Thought content normal          Judgment: Judgment normal           Kristal Oliveira MD

## 2022-09-28 ENCOUNTER — OFFICE VISIT (OUTPATIENT)
Dept: PHYSICAL THERAPY | Age: 87
End: 2022-09-28
Payer: MEDICARE

## 2022-09-28 DIAGNOSIS — M25.512 CHRONIC LEFT SHOULDER PAIN: Primary | ICD-10-CM

## 2022-09-28 DIAGNOSIS — G89.29 CHRONIC LEFT SHOULDER PAIN: Primary | ICD-10-CM

## 2022-09-28 PROCEDURE — 97110 THERAPEUTIC EXERCISES: CPT | Performed by: PHYSICAL THERAPIST

## 2022-09-28 NOTE — PROGRESS NOTES
Daily Note     Today's date: 2022  Patient name: Octaviano Ivey  : 1935  MRN: [id number]  Referring provider: Trupti Roman DO  Dx:   Encounter Diagnosis     ICD-10-CM    1  Chronic left shoulder pain  M25 512     G89 29                   Subjective: Patient reported she has left shoulder pain limitations with dressing activities, grooming activities and lifting activities  Patient noted pain s minimal at rest and moderate with above noted functional deficits  But, she noted she is able to dress tops better since onset of PT  Objective: See treatment diary below  Assessment: Patient presents with left [address] joint limited by painful arc, which indicates impingement / RTC tendonitis  But, she presents with both arom and strength improving despite pain presentation since pain presentation is less overall since onset of PT  Thus, PT is warranted to facilitate left GH joint pain reduction, mobility and strength improvements to progress to full functional status without left GH joint pain limitations         Plan: Cont with Plan of care      Precautions: Patient's PMHx is remarkable for GERD without esophagitis, Aortic stenosis, mild, DJD (degenerative joint disease), multiple sites, Osteoporosis, Psoriasis, Glaucoma, and HTN      Manuals 9/8 9/13 9/15 9/20 9/22 9/28       Kinesio taping to left [address] joint in a "Y" pattern with base of "Y" at deltoid tuberosity and tails at anterior and posterior deltoid musculature with tails at 50 % tension and an additional "I" strip perpendicular to the base of the "Y" with a diagonal upward force at 50 % tension 15 min MAURILIO  NT since still on since last PT visit NT 10 min NT                    Neuro Re-Ed                                                    Ther Ex                          Nu step   10 min 10 min 10 min 10 min       Pulleys  5 min  5 min 5 min 5 min 5 min       Corner pec stretch at 45 degrees:B:  10x 10sec  10 sec x 10 10 sec x 10 10 sec x 10 10 sec x 10       Scapular squeezes:B: 10 x  20x 3sec  3 sec x 20 3 sec x 20 3 sec x 20 3 sec x 20       Seated postural correction slough over correct 10 x  20x 3 sec  x20 2 x 10  3 sec x 20       Seated table slides:L flexion and scaption:L 10 x  20x 5sec  3 sec x 20 2 x 10 2 x 10 2 x 10       Seated shoulder ER stretch with wedge:L  10x 5sec  10 sec x 5 10 sec x 10 10 sec x 10 10 sec x 10       Cervical spine retraction  20x  2 x 10 2 x 10 2 x 10 2 x 10                    Standing shoulder scaption and flexion:B: 10 x  20x  1# x 20 1# x 20 1# x 20 1 5# x 20       Biceps curls:B:  20x 1# 2# x 20 2# x 20 2 5# x 20 3# x 20       Shoulder abduction with spc:L  NT NT NT NT NT       Shoulder ER with spc:L  10X 5SEC  NT NT NT NT                    Dagmar rows and extension:B:  8# 20X   NT 8# x 20 Rows 9# x 20 and extension 7 # x 20                    Supine shoulder flexion with bench press  20X  2# x 20 2# x 20 2# x 20 3# x 20       Supine scap punches and triceps extension:L  20X  1# x 20 1# x 20 1 5# x 20 1 5# x 20       Right side lying left shoulder ER and abduction  20X  2 x 10 2 x 10 NT NT                    Prone shoulder extension, horizontal abduction and scaption:L  NT NT NT NT NT                                                                                                  Ther Activity                                       Gait Training                                       Modalities             MHP to left GH joint while seated PRN

## 2022-09-29 ENCOUNTER — APPOINTMENT (OUTPATIENT)
Dept: PHYSICAL THERAPY | Age: 87
End: 2022-09-29
Payer: MEDICARE

## 2022-09-30 ENCOUNTER — OFFICE VISIT (OUTPATIENT)
Dept: PHYSICAL THERAPY | Age: 87
End: 2022-09-30
Payer: MEDICARE

## 2022-09-30 DIAGNOSIS — M25.512 CHRONIC LEFT SHOULDER PAIN: Primary | ICD-10-CM

## 2022-09-30 DIAGNOSIS — G89.29 CHRONIC LEFT SHOULDER PAIN: Primary | ICD-10-CM

## 2022-09-30 PROCEDURE — 97110 THERAPEUTIC EXERCISES: CPT | Performed by: PHYSICAL THERAPIST

## 2022-09-30 NOTE — PROGRESS NOTES
PT Evaluation  / PT Reassessment    Today's date: 2022  Patient name: Michael Pierce  : 1935  MRN: [id number]  Referring provider: Fariba Braga DO  Dx:   Encounter Diagnosis     ICD-10-CM    1  Chronic left shoulder pain  M25 512     G89 29                   Assessment  Assessment details: PT Reassessment:    Patient reported the following progress since onset of PT: decrease in left shoulder pain, increase in left ue mobility and strength, dressing, reaching and lifting improvements  But, she noted the following deficits that still persists:  Undergarment dressing, unable to wash and dry her back, reaching and lifting based functional activities  PT IE: 2022  Patient reported onset of left shoulder pain initially occurred last year  Patient noted she had a Cortizone injection in 2021 and another one on 2022 which had not effect  Patient noted she landed against the wall and aggravated it  Patient noted she has the following deficits due to left shoulder pain: reaching, lifting, lowering items from and elevated, donning and doffing her clothing, reaching back to dress and wash her back, left side lying  Patient noted sitting will reduce her pain  Patient noted she does have intermittent aching pain  Patient noted the following factors reduce her pain: heat, pain patch and OTC pain medication  Patient noted she is right ue dominant  Patient noted the pain location is superior and anterior left GH joint region  Patient denies left ue paresthesias  Patient noted x-ray was - for bony involvement  Patient noted left ue feels weak as well  Patient reported using right ue to lower left ue during lifting activities in pain reducing      Impairments: abnormal or restricted ROM, abnormal movement, activity intolerance, lacks appropriate home exercise program and pain with function  Understanding of Dx/Px/POC: excellent   Prognosis: good  Prognosis details: Patient is a 80y o  year old female seen for outpatient PT reevaluation with left shoulder pain due to bursitis and RTC tendinopathy  Patient presents with the following progress since onset of PT: decrease in left GH joint pain, increase in left ue mobility and strength, decrease in TTP, and functional progress  Patient presents to PT reassessment  with the following problems, concerns, deficits and impairments: left shoulder pain, decreased left ue range of motion, decreased left ue strength, + TTP, + special tests, decrease in postural awareness, functional limitations and decreased tolerance to activity  Patient would benefit from skilled PT services under the following PT treatment plan to address the above noted deficits: therapeutic exercises and activities to facilitate left ue rom and strength, postural reeducation and strengthening, modalities, manual therapy techniques, IASTM technique, Kinesio taping techniques, and a hep  Thank you for the referral      Goals  Short Term goals - 4 weeks  1  Patient will be independent HEP   MET  2   Patient will report a 25 - 50% decrease in pain complaints  MET  3   Increase strength 1/2 grade  MET  4   Increase ROM 5-10 degrees  MET  Long Term goals - 8 weeks  1  Patient will report elimination of pain complaints  Partially MET  2   Patient will return to all recreational activities without restriction  Partially MET  3   ROM WFL  Partially MET  4   Strength 5/5  Partially MET  5   Patient will report reaching is improved by > 50 % prior to left shoulder symptoms and limitations  Partially MET  6   Patient will report house hold lifting activities are improved by > 50 % prior to left shoulder symptoms and limitations  Partially MET  7   Patient will report ability to lower house hold activities like dishes and cups are improved by > 50 % prior to left shoulder symptoms and limitations  Partially MET    8   Patient will report ability to don and doff her clothing with > 50 % improvement prior to left shoulder symptoms and limitations  Partially MET  9   Patient will report ability to wash and dress herself with > 50 % improvement prior to left shoulder symptoms and limitations  Partially MET  Plan  Patient would benefit from: skilled physical therapy  Planned modality interventions: cryotherapy, TENS, thermotherapy: hydrocollator packs, ultrasound and unattended electrical stimulation  Planned therapy interventions: joint mobilization, manual therapy, massage, balance, balance/weight bearing training, neuromuscular re-education, patient education, body mechanics training, self care, strengthening, stretching, therapeutic activities, therapeutic exercise, therapeutic training, transfer training, flexibility, functional ROM exercises, gait training, graded activity, graded exercise, graded motor, home exercise program and compression  Frequency: 2x week  Duration in weeks: 8  Treatment plan discussed with: patient        Subjective Evaluation    History of Present Illness  Mechanism of injury: recautions: Patient's PMHx is remarkable for GERD without esophagitis, Aortic stenosis, mild, DJD (degenerative joint disease), multiple sites, Osteoporosis, Psoriasis, Glaucoma, and HTN  Pain  At best pain ratin  At worst pain ratin  Location: Superior and Anterior Left shoulder    Patient Goals  Patient goals for therapy: decreased pain, increased motion, increased strength, independence with ADLs/IADLs and return to sport/leisure activities          Objective     Tenderness     Additional Tenderness Details  Patient is + TTP at moderate levels at left posterior acromion process  Patient exhibits protracted cervical spine posture at minimal to moderate levels      Active Range of Motion   Left Shoulder   Flexion: 146 degrees with pain  Abduction: 138 degrees with pain  External rotation 90°: 46 degrees with pain  Internal rotation 90°: 24 degrees with pain    Right Shoulder Flexion: 162 degrees   Abduction: 154 degrees   External rotation 90°: 64 degrees  Internal rotation 90°: 52 degrees     Strength/Myotome Testing     Left Shoulder     Planes of Motion   Flexion: 4   Abduction: 4-   External rotation at 0°: 4-   Internal rotation at 0°: 4     Right Shoulder     Planes of Motion   Flexion: 4   Abduction: 4   External rotation at 0°: 4   Internal rotation at 0°: 4     Left Elbow   Flexion: 4+  Extension: 4+    Right Elbow   Flexion: 5  Extension: 4+    Tests     Left Shoulder   Positive empty can, Hawkin's and painful arc                       Precautions: Patient's PMHx is remarkable for GERD without esophagitis, Aortic stenosis, mild, DJD (degenerative joint disease), multiple sites, Osteoporosis, Psoriasis, Glaucoma, and HTN      Manuals 9/8 9/13 9/15 9/20 9/22 9/28 9/30      Kinesio taping to left [address] joint in a "Y" pattern with base of "Y" at deltoid tuberosity and tails at anterior and posterior deltoid musculature with tails at 50 % tension and an additional "I" strip perpendicular to the base of the "Y" with a diagonal upward force at 50 % tension 15 min MAURILIO  NT since still on since last PT visit NT 10 min NT                    Neuro Re-Ed                                                    Ther Ex                          Nu step   10 min 10 min 10 min 10 min 10 min      Pulleys  5 min  5 min 5 min 5 min 5 min 5 min      Corner pec stretch at 45 degrees:B:  10x 10sec  10 sec x 10 10 sec x 10 10 sec x 10 10 sec x 10 10 sec x 10      Scapular squeezes:B: 10 x  20x 3sec  3 sec x 20 3 sec x 20 3 sec x 20 3 sec x 20 3 sec x 20      Seated postural correction slough over correct 10 x  20x 3 sec  x20 2 x 10  3 sec x 20 3 sec x 20      Seated table slides:L flexion and scaption:L 10 x  20x 5sec  3 sec x 20 2 x 10 2 x 10 2 x 10 2 x 10      Seated shoulder ER stretch with wedge:L  10x 5sec  10 sec x 5 10 sec x 10 10 sec x 10 10 sec x 10 10 sec x 10      Cervical spine retraction  20x  2 x 10 2 x 10 2 x 10 2 x 10 2 x 10                   Standing shoulder scaption and flexion:B: 10 x  20x  1# x 20 1# x 20 1# x 20 1 5# x 20 1 5# x 20      Biceps curls:B:  20x 1# 2# x 20 2# x 20 2 5# x 20 3# x 20 3# x 20      Shoulder abduction with spc:L  NT NT NT NT NT NT      Shoulder ER with spc:L  10X 5SEC  NT NT NT NT NT                   Dagmar rows and extension:B:  8# 20X   NT 8# x 20 Rows 9# x 20 and extension 7 # x 20 Rows 9# x 20 and extension 6# x 20                    Supine shoulder flexion with bench press  20X  2# x 20 2# x 20 2# x 20 3# x 20 2 x 10      Supine scap punches and triceps extension:L  20X  1# x 20 1# x 20 1 5# x 20 1 5# x 20 1 5# x 20      Right side lying left shoulder ER and abduction  20X  2 x 10 2 x 10 NT NT NT                   Prone shoulder extension, horizontal abduction and scaption:L  NT NT NT NT NT NT                                                                                                 Ther Activity                                       Gait Training                                       Modalities             MHP to left GH joint while seated PRN

## 2022-09-30 NOTE — LETTER
2022    Mara Kumar DO  [address] [facility]    Patient: Jovon James   YOB: 1935   Date of Visit: 2022     Encounter Diagnosis     ICD-10-CM    1  Chronic left shoulder pain  M25 512     G89 29        Dear Dr Olivia Javier:    Thank you for your recent referral of Jovon James  Please review the attached evaluation summary from Dori's recent visit  Please verify that you agree with the plan of care by signing the attached order  If you have any questions or concerns, please do not hesitate to call  I sincerely appreciate the opportunity to share in the care of one of your patients and hope to have another opportunity to work with you in the near future  Sincerely,    Roverto Matthews, PT      Referring Provider:      I certify that I have read the below Plan of Care and certify the need for these services furnished under this plan of treatment while under my care  Mara RamírezDO [address] [place] [address]  Via Fax: [phone]          PT Evaluation  / PT Reassessment    Today's date: 2022  Patient name: Jovon James  : 1935  MRN: [id number]  Referring provider: Monica Peña DO  Dx:   Encounter Diagnosis     ICD-10-CM    1  Chronic left shoulder pain  M25 512     G89 29                   Assessment  Assessment details: PT Reassessment:    Patient reported the following progress since onset of PT: decrease in left shoulder pain, increase in left ue mobility and strength, dressing, reaching and lifting improvements  But, she noted the following deficits that still persists:  Undergarment dressing, unable to wash and dry her back, reaching and lifting based functional activities  PT IE: 2022  Patient reported onset of left shoulder pain initially occurred last year    Patient noted she had a Cortizone injection in 2021 and another one on 2022 which had not effect  Patient noted she landed against the wall and aggravated it  Patient noted she has the following deficits due to left shoulder pain: reaching, lifting, lowering items from and elevated, donning and doffing her clothing, reaching back to dress and wash her back, left side lying  Patient noted sitting will reduce her pain  Patient noted she does have intermittent aching pain  Patient noted the following factors reduce her pain: heat, pain patch and OTC pain medication  Patient noted she is right ue dominant  Patient noted the pain location is superior and anterior left GH joint region  Patient denies left ue paresthesias  Patient noted x-ray was - for bony involvement  Patient noted left ue feels weak as well  Patient reported using right ue to lower left ue during lifting activities in pain reducing  Impairments: abnormal or restricted ROM, abnormal movement, activity intolerance, lacks appropriate home exercise program and pain with function  Understanding of Dx/Px/POC: excellent   Prognosis: good  Prognosis details: Patient is a 80y o  year old female seen for outpatient PT reevaluation with left shoulder pain due to bursitis and RTC tendinopathy  Patient presents with the following progress since onset of PT: decrease in left GH joint pain, increase in left ue mobility and strength, decrease in TTP, and functional progress  Patient presents to PT reassessment  with the following problems, concerns, deficits and impairments: left shoulder pain, decreased left ue range of motion, decreased left ue strength, + TTP, + special tests, decrease in postural awareness, functional limitations and decreased tolerance to activity    Patient would benefit from skilled PT services under the following PT treatment plan to address the above noted deficits: therapeutic exercises and activities to facilitate left ue rom and strength, postural reeducation and strengthening, modalities, manual therapy techniques, IASTM technique, Kinesio taping techniques, and a hep  Thank you for the referral      Goals  Short Term goals - 4 weeks  1  Patient will be independent HEP   MET  2   Patient will report a 25 - 50% decrease in pain complaints  MET  3   Increase strength 1/2 grade  MET  4   Increase ROM 5-10 degrees  MET  Long Term goals - 8 weeks  1  Patient will report elimination of pain complaints  Partially MET  2   Patient will return to all recreational activities without restriction  Partially MET  3   ROM WFL  Partially MET  4   Strength 5/5  Partially MET  5   Patient will report reaching is improved by > 50 % prior to left shoulder symptoms and limitations  Partially MET  6   Patient will report house hold lifting activities are improved by > 50 % prior to left shoulder symptoms and limitations  Partially MET  7   Patient will report ability to lower house hold activities like dishes and cups are improved by > 50 % prior to left shoulder symptoms and limitations  Partially MET  8   Patient will report ability to don and doff her clothing with > 50 % improvement prior to left shoulder symptoms and limitations  Partially MET  9   Patient will report ability to wash and dress herself with > 50 % improvement prior to left shoulder symptoms and limitations  Partially MET      Plan  Patient would benefit from: skilled physical therapy  Planned modality interventions: cryotherapy, TENS, thermotherapy: hydrocollator packs, ultrasound and unattended electrical stimulation  Planned therapy interventions: joint mobilization, manual therapy, massage, balance, balance/weight bearing training, neuromuscular re-education, patient education, body mechanics training, self care, strengthening, stretching, therapeutic activities, therapeutic exercise, therapeutic training, transfer training, flexibility, functional ROM exercises, gait training, graded activity, graded exercise, graded motor, home exercise program and compression  Frequency: 2x week  Duration in weeks: 8  Treatment plan discussed with: patient        Subjective Evaluation    History of Present Illness  Mechanism of injury: recautions: Patient's PMHx is remarkable for GERD without esophagitis, Aortic stenosis, mild, DJD (degenerative joint disease), multiple sites, Osteoporosis, Psoriasis, Glaucoma, and HTN  Pain  At best pain ratin  At worst pain ratin  Location: Superior and Anterior Left shoulder    Patient Goals  Patient goals for therapy: decreased pain, increased motion, increased strength, independence with ADLs/IADLs and return to sport/leisure activities          Objective     Tenderness     Additional Tenderness Details  Patient is + TTP at moderate levels at left posterior acromion process  Patient exhibits protracted cervical spine posture at minimal to moderate levels  Active Range of Motion   Left Shoulder   Flexion: 146 degrees with pain  Abduction: 138 degrees with pain  External rotation 90°: 46 degrees with pain  Internal rotation 90°: 24 degrees with pain    Right Shoulder   Flexion: 162 degrees   Abduction: 154 degrees   External rotation 90°: 64 degrees  Internal rotation 90°: 52 degrees     Strength/Myotome Testing     Left Shoulder     Planes of Motion   Flexion: 4   Abduction: 4-   External rotation at 0°: 4-   Internal rotation at 0°: 4     Right Shoulder     Planes of Motion   Flexion: 4   Abduction: 4   External rotation at 0°: 4   Internal rotation at 0°: 4     Left Elbow   Flexion: 4+  Extension: 4+    Right Elbow   Flexion: 5  Extension: 4+    Tests     Left Shoulder   Positive empty can, Hawkin's and painful arc                       Precautions: Patient's PMHx is remarkable for GERD without esophagitis, Aortic stenosis, mild, DJD (degenerative joint disease), multiple sites, Osteoporosis, Psoriasis, Glaucoma, and HTN      Manuals 9/8 9/13 9/15 9/20 9/22 9/28 9/30      Kinesio taping to left [address] joint in a "Y" pattern with base of "Y" at deltoid tuberosity and tails at anterior and posterior deltoid musculature with tails at 50 % tension and an additional "I" strip perpendicular to the base of the "Y" with a diagonal upward force at 50 % tension 15 min MAURILIO  NT since still on since last PT visit NT 10 min NT                    Neuro Re-Ed                                                    Ther Ex                          Nu step   10 min 10 min 10 min 10 min 10 min      Pulleys  5 min  5 min 5 min 5 min 5 min 5 min      Corner pec stretch at 45 degrees:B:  10x 10sec  10 sec x 10 10 sec x 10 10 sec x 10 10 sec x 10 10 sec x 10      Scapular squeezes:B: 10 x  20x 3sec  3 sec x 20 3 sec x 20 3 sec x 20 3 sec x 20 3 sec x 20      Seated postural correction slough over correct 10 x  20x 3 sec  x20 2 x 10  3 sec x 20 3 sec x 20      Seated table slides:L flexion and scaption:L 10 x  20x 5sec  3 sec x 20 2 x 10 2 x 10 2 x 10 2 x 10      Seated shoulder ER stretch with wedge:L  10x 5sec  10 sec x 5 10 sec x 10 10 sec x 10 10 sec x 10 10 sec x 10      Cervical spine retraction  20x  2 x 10 2 x 10 2 x 10 2 x 10 2 x 10                   Standing shoulder scaption and flexion:B: 10 x  20x  1# x 20 1# x 20 1# x 20 1 5# x 20 1 5# x 20      Biceps curls:B:  20x 1# 2# x 20 2# x 20 2 5# x 20 3# x 20 3# x 20      Shoulder abduction with spc:L  NT NT NT NT NT NT      Shoulder ER with spc:L  10X 5SEC  NT NT NT NT NT                   [place] rows and extension:B:  8# 20X   NT 8# x 20 Rows 9# x 20 and extension 7 # x 20 Rows 9# x 20 and extension 6# x 20                    Supine shoulder flexion with bench press  20X  2# x 20 2# x 20 2# x 20 3# x 20 2 x 10      Supine scap punches and triceps extension:L  20X  1# x 20 1# x 20 1 5# x 20 1 5# x 20 1 5# x 20      Right side lying left shoulder ER and abduction  20X  2 x 10 2 x 10 NT NT NT                   Prone shoulder extension, horizontal abduction and scaption:L  NT NT NT NT NT NT Ther Activity                                       Gait Training                                       Modalities             MHP to left GH joint while seated PRN

## 2022-10-04 ENCOUNTER — OFFICE VISIT (OUTPATIENT)
Dept: PHYSICAL THERAPY | Age: 87
End: 2022-10-04
Payer: MEDICARE

## 2022-10-04 ENCOUNTER — OFFICE VISIT (OUTPATIENT)
Dept: PODIATRY | Facility: CLINIC | Age: 87
End: 2022-10-04
Payer: MEDICARE

## 2022-10-04 VITALS
SYSTOLIC BLOOD PRESSURE: 110 MMHG | WEIGHT: 167 LBS | HEIGHT: 62 IN | BODY MASS INDEX: 30.73 KG/M2 | DIASTOLIC BLOOD PRESSURE: 72 MMHG

## 2022-10-04 DIAGNOSIS — I73.9 PERIPHERAL VASCULAR DISEASE, UNSPECIFIED (HCC): ICD-10-CM

## 2022-10-04 DIAGNOSIS — M25.512 CHRONIC LEFT SHOULDER PAIN: Primary | ICD-10-CM

## 2022-10-04 DIAGNOSIS — G89.29 CHRONIC LEFT SHOULDER PAIN: Primary | ICD-10-CM

## 2022-10-04 DIAGNOSIS — B35.1 ONYCHOMYCOSIS: Primary | ICD-10-CM

## 2022-10-04 DIAGNOSIS — M21.619 BUNION: ICD-10-CM

## 2022-10-04 PROCEDURE — 97110 THERAPEUTIC EXERCISES: CPT | Performed by: PHYSICAL THERAPIST

## 2022-10-04 PROCEDURE — 11721 DEBRIDE NAIL 6 OR MORE: CPT | Performed by: PODIATRIST

## 2022-10-04 PROCEDURE — 99202 OFFICE O/P NEW SF 15 MIN: CPT | Performed by: PODIATRIST

## 2022-10-04 NOTE — PROGRESS NOTES
PATIENT:  Brenda Talamantes  1935    ASSESSMENT/PLAN:  1  Onychomycosis  Debridement   2  Bunion     3  Peripheral vascular disease, unspecified ([address] )          1  Patient was counseled and educated on the condition and the diagnosis  2  She is not a good candidate for medical treatment of onychomycosis  Recommended periodic foot care  Toenails debrided  3  Accommodative footwear for bunion  4  The patient will follow up in 9 weeks for foot exam and care  PROCEDURE:  All mycotic toenails were reduced and debrided in length, width, and girth using a nail nipper and dremel  Patient tolerated procedure(s) well without complications  HPI:  Brenda Talamantes is a 80 y  o year old female seen for chief complaint of thick toenails  She has chronic nail fungus with tenderness  She has difficulty managing them at home  No history of diabetes  No significant numbness in her feet  The patient denied any acute pedal disorder, redness, acute swelling, or recent injury  She has bunion in her feet  She had surgery in 80's but it came back  PAST MEDICAL HISTORY:  Past Medical History:   Diagnosis Date    Hypertension     Osteoporosis        PAST SURGICAL HISTORY:  History reviewed  No pertinent surgical history  ALLERGIES:  Patient has no known allergies  MEDICATIONS:  Current Outpatient Medications   Medication Sig Dispense Refill    brimonidine (Alphagan P) 0 1 %       celecoxib (CeleBREX) 100 mg capsule Take 1 capsule (100 mg total) by mouth daily 90 capsule 3    HALOG 0 1 % CREA       omeprazole (PriLOSEC) 20 mg delayed release capsule Take 1 capsule (20 mg total) by mouth daily 90 capsule 3    rosuvastatin (CRESTOR) 10 MG tablet Take 1 tablet (10 mg total) by mouth daily 90 tablet 3    TRAVATAN Z 0 004 % ophthalmic solution        No current facility-administered medications for this visit         SOCIAL HISTORY:  Social History     Socioeconomic History    Marital status:      Spouse name: None    Number of children: None    Years of education: None    Highest education level: None   Occupational History    None   Tobacco Use    Smoking status: Former Smoker     Packs/day: 0 50     Types: Cigarettes    Smokeless tobacco: Former User    Tobacco comment: Patient reports she smoked for 20 years   Vaping Use    Vaping Use: Never used   Substance and Sexual Activity    Alcohol use: Yes    Drug use: No    Sexual activity: Not Currently   Other Topics Concern    None   Social History Narrative    None     Social Determinants of Health     Financial Resource Strain: Medium Risk    Difficulty of Paying Living Expenses: Somewhat hard   Food Insecurity: Not on file   Transportation Needs: Unmet Transportation Needs    Lack of Transportation (Medical): Yes    Lack of Transportation (Non-Medical): Yes   Physical Activity: Not on file   Stress: Not on file   Social Connections: Not on file   Intimate Partner Violence: Not on file   Housing Stability: Not on file        REVIEW OF SYSTEMS:  GENERAL: No fever or chills  HEART: No chest pain, or palpitation  RESPIRATORY:  No acute SOB or cough  GI: No Nausea, vomit or diarrhea  NEUROLOGIC: No syncope or acute weakness    PHYSICAL EXAM:    /72   Ht 5' 2" (1 575 m) Comment: verbal  Wt 75 8 kg (167 lb)   BMI 30 54 kg/m²     VASCULAR EXAM  Dorsalis pedis  +1, Posterior tibial artery  absent  The patient has class findings with skin atrophy, lack of digital hair, and nail dystrophy  There is trace lower extremity edema bilaterally  NEUROLOGIC EXAM  AAO X 3  Sensation is intact to light touch  No focal neurologic deficit  DERMATOLOGIC EXAM:   No ulcer  No abscess  No cellulitis noted  The patient has hypertrophic toenails X 8 with discoloration, onycholysis, and subungal debris  MUSCULOSKELETAL EXAM:   No acute joint pain  No acute edema, or redness  No acute musculoskeletal problem  Patient has deformity including bunion, right worse than left  Decreased 1st MPJ ROM

## 2022-10-04 NOTE — PROGRESS NOTES
Daily Note     Today's date: 10/4/2022  Patient name: Milton Shoemaker  : 1935  MRN: [id number]  Referring provider: Florina Vogt DO  Dx:   Encounter Diagnosis     ICD-10-CM    1  Chronic left shoulder pain  M25 512     G89 29                   Subjective: Patient reported her left shoulder pain limitations with dressing activities, grooming activities and lifting activities continue to improve since onset of PT  Patient continues to report left shoulder pain remains at minimal levels at rest and moderate levels with above noted functional deficits  Objective: See treatment diary below  Assessment: Patient presents with left GH joint weakness and fatigue more limiting during PT treatment than pain  But, pain still persists during self and house hold activities that remain limiting to full functional return  Also, today's progress was noted as per weakness and fatigue limitations, not pain limitations  Thus, PT is warranted to facilitate left GH joint pain reduction, mobility and strength improvements to progress to full functional status without left GH joint pain limitations  Plan: Cont with Plan of care               Precautions: Patient's PMHx is remarkable for GERD without esophagitis, Aortic stenosis, mild, DJD (degenerative joint disease), multiple sites, Osteoporosis, Psoriasis, Glaucoma, and HTN      Manuals 9/8 9/13 9/15 9/20 9/22 9/28 9/30 10/04     Kinesio taping to left [facility] joint in a "Y" pattern with base of "Y" at deltoid tuberosity and tails at anterior and posterior deltoid musculature with tails at 50 % tension and an additional "I" strip perpendicular to the base of the "Y" with a diagonal upward force at 50 % tension 15 min MAURILIO  NT since still on since last PT visit NT 10 min NT                    Neuro Re-Ed                                                    Ther Ex                          Nu step   10 min 10 min 10 min 10 min 10 min 10 min     Pulleys  5 min  5 min 5 min 5 min 5 min 5 min 5 min     Corner pec stretch at 45 degrees:B:  10x 10sec  10 sec x 10 10 sec x 10 10 sec x 10 10 sec x 10 10 sec x 10 10 sec x 10     Scapular squeezes:B: 10 x  20x 3sec  3 sec x 20 3 sec x 20 3 sec x 20 3 sec x 20 3 sec x 20 3 sec x 20     Seated postural correction slough over correct 10 x  20x 3 sec  x20 2 x 10  3 sec x 20 3 sec x 20 NT     Seated table slides:L flexion and scaption:L 10 x  20x 5sec  3 sec x 20 2 x 10 2 x 10 2 x 10 2 x 10 2 x 10     Seated shoulder ER stretch with wedge:L  10x 5sec  10 sec x 5 10 sec x 10 10 sec x 10 10 sec x 10 10 sec x 10 10 sec x 10     Cervical spine retraction  20x  2 x 10 2 x 10 2 x 10 2 x 10 2 x 10 NT                  Standing shoulder scaption and flexion:B: 10 x  20x  1# x 20 1# x 20 1# x 20 1 5# x 20 1 5# x 20 2# x 20     Biceps curls:B:  20x 1# 2# x 20 2# x 20 2 5# x 20 3# x 20 3# x 20 4# x 20     Shoulder abduction with spc:L  NT NT NT NT NT NT NT     Shoulder ER with spc:L  10X 5SEC  NT NT NT NT NT NT                  [place] rows and extension:B:  8# 20X   NT 8# x 20 Rows 9# x 20 and extension 7 # x 20 Rows 9# x 20 and extension 6# x 20  NT                  Supine shoulder flexion with bench press  20X  2# x 20 2# x 20 2# x 20 3# x 20 2 x 10 NT     Supine scap punches and triceps extension:L  20X  1# x 20 1# x 20 1 5# x 20 1 5# x 20 1 5# x 20 NT     Right side lying left shoulder ER and abduction  20X  2 x 10 2 x 10 NT NT NT NT                  Prone shoulder extension, horizontal abduction and scaption:L  NT NT NT NT NT NT NT                                                                                                Ther Activity                                       Gait Training                                       Modalities             MHP to left GH joint while seated PRN

## 2022-10-06 ENCOUNTER — TELEPHONE (OUTPATIENT)
Dept: PODIATRY | Facility: CLINIC | Age: 87
End: 2022-10-06

## 2022-10-06 ENCOUNTER — OFFICE VISIT (OUTPATIENT)
Dept: PHYSICAL THERAPY | Age: 87
End: 2022-10-06
Payer: MEDICARE

## 2022-10-06 DIAGNOSIS — M25.512 CHRONIC LEFT SHOULDER PAIN: Primary | ICD-10-CM

## 2022-10-06 DIAGNOSIS — G89.29 CHRONIC LEFT SHOULDER PAIN: Primary | ICD-10-CM

## 2022-10-06 PROCEDURE — 97110 THERAPEUTIC EXERCISES: CPT

## 2022-10-06 NOTE — PROGRESS NOTES
Daily Note     Today's date: 10/6/2022  Patient name: Tatiana Reddy  : 1935  MRN: [id number]  Referring provider: Grayson Li DO  Dx:   Encounter Diagnosis     ICD-10-CM    1  Chronic left shoulder pain  M25 512     G89 29                   Subjective: Patient reported no new complaints     Objective: See treatment diary below  Assessment: Good tolerance to today's session  Plan: Cont with Plan of care               Precautions: Patient's PMHx is remarkable for GERD without esophagitis, Aortic stenosis, mild, DJD (degenerative joint disease), multiple sites, Osteoporosis, Psoriasis, Glaucoma, and HTN      Manuals 9/8 9/13 9/15 9/20 9/22 9/28 9/30 10/04 10/6    Kinesio taping to left GH joint in a "Y" pattern with base of "Y" at deltoid tuberosity and tails at anterior and posterior deltoid musculature with tails at 50 % tension and an additional "I" strip perpendicular to the base of the "Y" with a diagonal upward force at 50 % tension 15 min MAURILIO  NT since still on since last PT visit NT 10 min NT   NT                 Neuro Re-Ed                                                    Ther Ex                          Nu step   10 min 10 min 10 min 10 min 10 min 10 min 8 MIN     Pulleys  5 min  5 min 5 min 5 min 5 min 5 min 5 min 5 MIN     Corner pec stretch at 45 degrees:B:  10x 10sec  10 sec x 10 10 sec x 10 10 sec x 10 10 sec x 10 10 sec x 10 10 sec x 10 10X 10SEC     Scapular squeezes:B: 10 x  20x 3sec  3 sec x 20 3 sec x 20 3 sec x 20 3 sec x 20 3 sec x 20 3 sec x 20 20X 3SEC     Seated postural correction slough over correct 10 x  20x 3 sec  x20 2 x 10  3 sec x 20 3 sec x 20 NT NT    Seated table slides:L flexion and scaption:L 10 x  20x 5sec  3 sec x 20 2 x 10 2 x 10 2 x 10 2 x 10 2 x 10 20X    Seated shoulder ER stretch with wedge:L  10x 5sec  10 sec x 5 10 sec x 10 10 sec x 10 10 sec x 10 10 sec x 10 10 sec x 10 10X 10SEC     Cervical spine retraction  20x  2 x 10 2 x 10 2 x 10 2 x 10 2 x 10 NT 20X                 Standing shoulder scaption and flexion:B: 10 x  20x  1# x 20 1# x 20 1# x 20 1 5# x 20 1 5# x 20 2# x 20 20X 2#     Biceps curls:B:  20x 1# 2# x 20 2# x 20 2 5# x 20 3# x 20 3# x 20 4# x 20 20X 4#    Shoulder abduction with spc:L  NT NT NT NT NT NT NT NT    Shoulder ER with spc:L  10X 5SEC  NT NT NT NT NT NT NT                 [place] rows and extension:B:  8# 20X   NT 8# x 20 Rows 9# x 20 and extension 7 # x 20 Rows 9# x 20 and extension 6# x 20  NT NT                 Supine shoulder flexion with bench press  20X  2# x 20 2# x 20 2# x 20 3# x 20 2 x 10 NT 20X 3#     Supine scap punches and triceps extension:L  20X  1# x 20 1# x 20 1 5# x 20 1 5# x 20 1 5# x 20 NT 20X 3#     Right side lying left shoulder ER and abduction  20X  2 x 10 2 x 10 NT NT NT NT 20X                  Prone shoulder extension, horizontal abduction and scaption:L  NT NT NT NT NT NT NT NT                                                                                               Ther Activity                                       Gait Training                                       Modalities             MHP to left GH joint while seated PRN

## 2022-10-06 NOTE — TELEPHONE ENCOUNTER
Caller: Patient    Doctor: Yousif Hernandez    Reason for call: misplaced glasses (red frame)    Call back#: [phone]

## 2022-10-11 ENCOUNTER — OFFICE VISIT (OUTPATIENT)
Dept: PHYSICAL THERAPY | Age: 87
End: 2022-10-11
Payer: MEDICARE

## 2022-10-11 DIAGNOSIS — G89.29 CHRONIC LEFT SHOULDER PAIN: Primary | ICD-10-CM

## 2022-10-11 DIAGNOSIS — M25.512 CHRONIC LEFT SHOULDER PAIN: Primary | ICD-10-CM

## 2022-10-11 PROCEDURE — 97110 THERAPEUTIC EXERCISES: CPT | Performed by: PHYSICAL THERAPIST

## 2022-10-11 NOTE — PROGRESS NOTES
Daily Note     Today's date: 10/11/2022  Patient name: Marisa Del Rio  : 1935  MRN: [id number]  Referring provider: Shyann Johnson DO  Dx:   Encounter Diagnosis     ICD-10-CM    1  Chronic left shoulder pain  M25 512     G89 29                   Subjective: Patient reported minimal left shoulder pain persists at rest but is aggravated and limits reaching, lifting, and dressing activities  Objective: See treatment diary below  Assessment: Patient presents with pain production at the anterior left [facility] joint with initial mid range movements that resolve with repeated movements, but end range movements in PT mimic self and house hold iadl limitations with pain at end range elevation movements  Thus, PT is warranted to facilitate left shoulder pain reduction, left shoulder mobility and strength progress to promote functional progress  Plan: Cont with Plan of care               Precautions: Patient's PMHx is remarkable for GERD without esophagitis, Aortic stenosis, mild, DJD (degenerative joint disease), multiple sites, Osteoporosis, Psoriasis, Glaucoma, and HTN      Manuals 9/8 9/13 9/15 9/20 9/22 9/28 9/30 10/04 10/6 10/11   Kinesio taping to left [facility] joint in a "Y" pattern with base of "Y" at deltoid tuberosity and tails at anterior and posterior deltoid musculature with tails at 50 % tension and an additional "I" strip perpendicular to the base of the "Y" with a diagonal upward force at 50 % tension 15 min MAURILIO  NT since still on since last PT visit NT 10 min NT   NT NT                Neuro Re-Ed                                                    Ther Ex                          Nu step   10 min 10 min 10 min 10 min 10 min 10 min 8 MIN  8 min   Pulleys  5 min  5 min 5 min 5 min 5 min 5 min 5 min 5 MIN  5 min   Doorway pec stretch at 45 degrees:B:  10x 10sec  10 sec x 10 10 sec x 10 10 sec x 10 10 sec x 10 10 sec x 10 10 sec x 10 10X 10SEC  10 sec x 10   Scapular squeezes:B: 10 x  20x 3sec  3 sec x 20 3 sec x 20 3 sec x 20 3 sec x 20 3 sec x 20 3 sec x 20 20X 3SEC  3 sec x 20   Seated postural correction slough over correct 10 x  20x 3 sec  x20 2 x 10  3 sec x 20 3 sec x 20 NT NT 2 x 10   Seated table slides:L flexion and scaption:L 10 x  20x 5sec  3 sec x 20 2 x 10 2 x 10 2 x 10 2 x 10 2 x 10 20X 2 x 10 and wall slides   Seated shoulder ER stretch with wedge:L  10x 5sec  10 sec x 5 10 sec x 10 10 sec x 10 10 sec x 10 10 sec x 10 10 sec x 10 10X 10SEC  10 sec x 10   Cervical spine retraction  20x  2 x 10 2 x 10 2 x 10 2 x 10 2 x 10 NT 20X 2 x 10                Standing shoulder scaption and flexion:B: 10 x  20x  1# x 20 1# x 20 1# x 20 1 5# x 20 1 5# x 20 2# x 20 20X 2#  2# x 20   Biceps curls:B:  20x 1# 2# x 20 2# x 20 2 5# x 20 3# x 20 3# x 20 4# x 20 20X 4# 4# x 20   Shoulder abduction with spc:L  NT NT NT NT NT NT NT NT NT   Shoulder ER with spc:L  10X 5SEC  NT NT NT NT NT NT NT NT                [place] rows and extension:B:  8# 20X   NT 8# x 20 Rows 9# x 20 and extension 7 # x 20 Rows 9# x 20 and extension 6# x 20  NT NT NT                Supine shoulder flexion with bench press  20X  2# x 20 2# x 20 2# x 20 3# x 20 2 x 10 NT 20X 3#  4# x 20   Supine scap punches and triceps extension:L  20X  1# x 20 1# x 20 1 5# x 20 1 5# x 20 1 5# x 20 NT 20X 3#  3# x 20   Right side lying left shoulder ER and abduction  20X  2 x 10 2 x 10 NT NT NT NT 20X  2 x 10                Prone shoulder extension, horizontal abduction and scaption:L  NT NT NT NT NT NT NT NT NT                                                                                              Ther Activity                                       Gait Training                                       Modalities             MHP to left GH joint while seated PRN

## 2022-10-13 ENCOUNTER — OFFICE VISIT (OUTPATIENT)
Dept: PHYSICAL THERAPY | Age: 87
End: 2022-10-13
Payer: MEDICARE

## 2022-10-13 DIAGNOSIS — G89.29 CHRONIC LEFT SHOULDER PAIN: Primary | ICD-10-CM

## 2022-10-13 DIAGNOSIS — M25.512 CHRONIC LEFT SHOULDER PAIN: Primary | ICD-10-CM

## 2022-10-13 PROCEDURE — 97110 THERAPEUTIC EXERCISES: CPT

## 2022-10-13 NOTE — PROGRESS NOTES
Daily Note     Today's date: 10/13/2022  Patient name: Denise Dumont  : 1935  MRN: [id number]  Referring provider: Dori Cardenas DO  Dx:   Encounter Diagnosis     ICD-10-CM    1  Chronic left shoulder pain  M25 512     G89 29                   Subjective: Patient reported "it is easier for me to put fasten my bra, take my top off and turn in bed"       Objective: See treatment diary below  Assessment: Good tolerance to today's session  L shoulder improved mobility  Plan: Cont with Plan of care               Precautions: Patient's PMHx is remarkable for GERD without esophagitis, Aortic stenosis, mild, DJD (degenerative joint disease), multiple sites, Osteoporosis, Psoriasis, Glaucoma, and HTN      Manuals  10/13   9/20 9/22 9/28 9/30 10/04 10/6 10/11   Kinesio taping to left GH joint in a "Y" pattern with base of "Y" at deltoid tuberosity and tails at anterior and posterior deltoid musculature with tails at 50 % tension and an additional "I" strip perpendicular to the base of the "Y" with a diagonal upward force at 50 % tension NT   NT 10 min NT   NT NT                Neuro Re-Ed                                                    Ther Ex                          Nu step  10 MIN    10 min 10 min 10 min 10 min 10 min 8 MIN  8 min   Pulleys 3 MIN    5 min 5 min 5 min 5 min 5 min 5 MIN  5 min   Doorway pec stretch at 45 degrees:B: 10X 10SEC    10 sec x 10 10 sec x 10 10 sec x 10 10 sec x 10 10 sec x 10 10X 10SEC  10 sec x 10   Scapular squeezes:B: 20X 3SEC    3 sec x 20 3 sec x 20 3 sec x 20 3 sec x 20 3 sec x 20 20X 3SEC  3 sec x 20   Seated postural correction slough over correct  20X    2 x 10  3 sec x 20 3 sec x 20 NT NT 2 x 10   Seated table slides:L flexion and scaption:L 20X    2 x 10 2 x 10 2 x 10 2 x 10 2 x 10 20X 2 x 10 and wall slides   Seated shoulder ER stretch with wedge:L 20X 3SEC    10 sec x 10 10 sec x 10 10 sec x 10 10 sec x 10 10 sec x 10 10X 10SEC  10 sec x 10   Cervical spine retraction 20X    2 x 10 2 x 10 2 x 10 2 x 10 NT 20X 2 x 10                Standing shoulder scaption and flexion:B: 20X 2#     1# x 20 1# x 20 1 5# x 20 1 5# x 20 2# x 20 20X 2#  2# x 20   Biceps curls:B: 20X 4#     2# x 20 2 5# x 20 3# x 20 3# x 20 4# x 20 20X 4# 4# x 20   Shoulder abduction with spc:L NT   NT NT NT NT NT NT NT   Shoulder ER with spc:L NT   NT NT NT NT NT NT NT                [place] rows and extension:B: NT   NT 8# x 20 Rows 9# x 20 and extension 7 # x 20 Rows 9# x 20 and extension 6# x 20  NT NT NT                Supine shoulder flexion with bench press 20X 4#    2# x 20 2# x 20 3# x 20 2 x 10 NT 20X 3#  4# x 20   Supine scap punches and triceps extension:L 20X 3#    1# x 20 1 5# x 20 1 5# x 20 1 5# x 20 NT 20X 3#  3# x 20   Right side lying left shoulder ER and abduction 20X 3#    2 x 10 NT NT NT NT 20X  2 x 10                Prone shoulder extension, horizontal abduction and scaption:L NT   NT NT NT NT NT NT NT                                                                                              Ther Activity                                       Gait Training                                       Modalities             MHP to left GH joint while seated PRN NT

## 2022-10-19 ENCOUNTER — OFFICE VISIT (OUTPATIENT)
Dept: PHYSICAL THERAPY | Age: 87
End: 2022-10-19
Payer: MEDICARE

## 2022-10-19 DIAGNOSIS — M25.512 CHRONIC LEFT SHOULDER PAIN: Primary | ICD-10-CM

## 2022-10-19 DIAGNOSIS — G89.29 CHRONIC LEFT SHOULDER PAIN: Primary | ICD-10-CM

## 2022-10-19 PROCEDURE — 97110 THERAPEUTIC EXERCISES: CPT

## 2022-10-19 NOTE — PROGRESS NOTES
Daily Note     Today's date: 10/19/2022  Patient name: Brenda Talamantes  : 1935  MRN: [id number]  Referring provider: Tejas Rnicon DO  Dx:   Encounter Diagnosis     ICD-10-CM    1  Chronic left shoulder pain  M25 512     G89 29                   Subjective: Patient reported slight improvement at L shoulder today  Objective: See treatment diary below  1:1 Adarsh Casas PT       Assessment: Improving with ROM and pain at L shoulder  Progress as able       Plan: Cont with Plan of care               Precautions: Patient's PMHx is remarkable for GERD without esophagitis, Aortic stenosis, mild, DJD (degenerative joint disease), multiple sites, Osteoporosis, Psoriasis, Glaucoma, and HTN      Manuals  10/13 10/19     9/28 9/30 10/04 10/6 10/11   Kinesio taping to left GH joint in a "Y" pattern with base of "Y" at deltoid tuberosity and tails at anterior and posterior deltoid musculature with tails at 50 % tension and an additional "I" strip perpendicular to the base of the "Y" with a diagonal upward force at 50 % tension NT NT    NT   NT NT                Neuro Re-Ed                                                    Ther Ex                          Nu step  10 MIN  10 MIN      10 min 10 min 10 min 8 MIN  8 min   Pulleys 3 MIN  4 MIN     5 min 5 min 5 min 5 MIN  5 min   Doorway pec stretch at 39 degrees:B: 10X 10SEC  10X 10SEC     10 sec x 10 10 sec x 10 10 sec x 10 10X 10SEC  10 sec x 10   Scapular squeezes:B: 20X 3SEC  20X 3SEC     3 sec x 20 3 sec x 20 3 sec x 20 20X 3SEC  3 sec x 20   Seated postural correction slough over correct  20X  20X     3 sec x 20 3 sec x 20 NT NT 2 x 10   Seated table slides:L flexion and scaption:L 20X  20X     2 x 10 2 x 10 2 x 10 20X 2 x 10 and wall slides   Seated shoulder ER stretch with wedge:L 20X 3SEC  20X     10 sec x 10 10 sec x 10 10 sec x 10 10X 10SEC  10 sec x 10   Cervical spine retraction 20X  20X 3SEC      2 x 10 2 x 10 NT 20X 2 x 10                Standing shoulder scaption and flexion:B: 20X 2#  20X 2#      1 5# x 20 1 5# x 20 2# x 20 20X 2#  2# x 20   Biceps curls:B: 20X 4#   20X 4#     3# x 20 3# x 20 4# x 20 20X 4# 4# x 20   Shoulder abduction with spc:L NT NT    NT NT NT NT NT   Shoulder ER with spc:L NT NT    NT NT NT NT NT                [place] rows and extension:B: NT NT    Rows 9# x 20 and extension 7 # x 20 Rows 9# x 20 and extension 6# x 20  NT NT NT                 Supine shoulder flexion with bench press 20X 4#  20X 4#     3# x 20 2 x 10 NT 20X 3#  4# x 20   Supine scap punches and triceps extension:L 20X 3#  20X 3#     1 5# x 20 1 5# x 20 NT 20X 3#  3# x 20   Right side lying left shoulder ER and abduction 20X 3#  20X 3#     NT NT NT 20X  2 x 10                Prone shoulder extension, horizontal abduction and scaption:L NT NT    NT NT NT NT NT                                                                                              Ther Activity                                       Gait Training                                       Modalities             MHP to left GH joint while seated PRN NT NT

## 2022-10-20 ENCOUNTER — OFFICE VISIT (OUTPATIENT)
Dept: PHYSICAL THERAPY | Age: 87
End: 2022-10-20
Payer: MEDICARE

## 2022-10-20 DIAGNOSIS — G89.29 CHRONIC LEFT SHOULDER PAIN: Primary | ICD-10-CM

## 2022-10-20 DIAGNOSIS — M25.512 CHRONIC LEFT SHOULDER PAIN: Primary | ICD-10-CM

## 2022-10-20 PROCEDURE — 97110 THERAPEUTIC EXERCISES: CPT | Performed by: PHYSICAL THERAPIST

## 2022-10-20 NOTE — PROGRESS NOTES
PT Evaluation  / PT Reassessment / PT Discharge    Today's date: 10/20/2022  Patient name: Peter Dietz  : 1935  MRN: [id number]  Referring provider: Torrey Humphreys DO  Dx:   Encounter Diagnosis     ICD-10-CM    1  Chronic left shoulder pain  M25 512     G89 29                   Assessment  Assessment details: PT Reassessment: 10/20/2022  Patient reported the following progress since onset of PT: dressing tops and coats / donning tops, reaching, lifting, pain reduction and left shoulder strength and mobility  But, she noted the following deficits that still persists: pain with doffing tops and coats, heavy lifting and left upper extremity pain and weakness  PT Reassessment:    Patient reported the following progress since onset of PT: decrease in left shoulder pain, increase in left ue mobility and strength, dressing, reaching and lifting improvements  But, she noted the following deficits that still persists:  Undergarment dressing, unable to wash and dry her back, reaching and lifting based functional activities  PT IE: 2022  Patient reported onset of left shoulder pain initially occurred last year  Patient noted she had a Cortizone injection in 2021 and another one on 2022 which had not effect  Patient noted she landed against the wall and aggravated it  Patient noted she has the following deficits due to left shoulder pain: reaching, lifting, lowering items from and elevated, donning and doffing her clothing, reaching back to dress and wash her back, left side lying  Patient noted sitting will reduce her pain  Patient noted she does have intermittent aching pain  Patient noted the following factors reduce her pain: heat, pain patch and OTC pain medication  Patient noted she is right ue dominant  Patient noted the pain location is superior and anterior left GH joint region  Patient denies left ue paresthesias    Patient noted x-ray was - for bony involvement  Patient noted left ue feels weak as well  Patient reported using right ue to lower left ue during lifting activities in pain reducing  Impairments: abnormal or restricted ROM, abnormal movement, activity intolerance, lacks appropriate home exercise program and pain with function  Understanding of Dx/Px/POC: excellent   Prognosis: good  Prognosis details: Patient is a 80y o  year old female seen for outpatient PT reevaluation with left shoulder pain due to bursitis and RTC tendinopathy  Patient presents with the following progress since onset of PT: decrease in left GH joint pain, increase in left ue mobility and strength, decrease in TTP, decrease in special tests and functional progress  Thus, due to functional an impairment progress patient requests d/c to hep  Thus, patient provided with final written and verbal hep and d/c to hep  Thank you for the referral      Goals  Short Term goals - 4 weeks  1  Patient will be independent HEP   MET  2   Patient will report a 25 - 50% decrease in pain complaints  MET  3   Increase strength 1/2 grade  MET  4   Increase ROM 5-10 degrees  MET  Long Term goals - 8 weeks  1  Patient will report elimination of pain complaints  Partially MET  2   Patient will return to all recreational activities without restriction  Partially MET  3   ROM WFL  Partially MET  4   Strength 5/5  Partially MET  5   Patient will report reaching is improved by > 50 % prior to left shoulder symptoms and limitations  Partially MET  6   Patient will report house hold lifting activities are improved by > 50 % prior to left shoulder symptoms and limitations  Partially MET  7   Patient will report ability to lower house hold activities like dishes and cups are improved by > 50 % prior to left shoulder symptoms and limitations  Partially MET    8   Patient will report ability to don and doff her clothing with > 50 % improvement prior to left shoulder symptoms and limitations  Partially MET  9   Patient will report ability to wash and dress herself with > 50 % improvement prior to left shoulder symptoms and limitations  Partially MET  Plan  Patient would benefit from: skilled physical therapy  Planned modality interventions: cryotherapy, TENS, thermotherapy: hydrocollator packs, ultrasound and unattended electrical stimulation  Planned therapy interventions: joint mobilization, manual therapy, massage, balance, balance/weight bearing training, neuromuscular re-education, patient education, body mechanics training, self care, strengthening, stretching, therapeutic activities, therapeutic exercise, therapeutic training, transfer training, flexibility, functional ROM exercises, gait training, graded activity, graded exercise, graded motor, home exercise program and compression  Frequency: 2x week  Duration in weeks: 8  Treatment plan discussed with: patient        Subjective Evaluation    History of Present Illness  Mechanism of injury: recautions: Patient's PMHx is remarkable for GERD without esophagitis, Aortic stenosis, mild, DJD (degenerative joint disease), multiple sites, Osteoporosis, Psoriasis, Glaucoma, and HTN  Pain  At best pain ratin  At worst pain ratin  Location: Superior and Anterior Left shoulder    Patient Goals  Patient goals for therapy: decreased pain, increased motion, increased strength, independence with ADLs/IADLs and return to sport/leisure activities          Objective     Tenderness     Additional Tenderness Details  Patient is - TTP at left posterior acromion process  Patient exhibits protracted cervical spine posture at minimal to moderate levels      Active Range of Motion   Left Shoulder   Flexion: 150 degrees with pain  Abduction: 144 degrees with pain  External rotation 90°: 56 degrees with pain  Internal rotation 90°: 32 degrees with pain    Right Shoulder   Flexion: 162 degrees   Abduction: 154 degrees   External rotation 90°: 64 degrees  Internal rotation 90°: 52 degrees     Strength/Myotome Testing     Left Shoulder     Planes of Motion   Flexion: 4   Abduction: 4-   External rotation at 0°: 4-   Internal rotation at 0°: 4+     Right Shoulder     Planes of Motion   Flexion: 4   Abduction: 4   External rotation at 0°: 4   Internal rotation at 0°: 4     Left Elbow   Flexion: 5  Extension: 4+    Right Elbow   Flexion: 5  Extension: 4+    Tests     Left Shoulder   Positive empty can and Hawkin's  Negative painful arc                         Precautions: Patient's PMHx is remarkable for GERD without esophagitis, Aortic stenosis, mild, DJD (degenerative joint disease), multiple sites, Osteoporosis, Psoriasis, Glaucoma, and HTN      Manuals  10/13 10/19 10/20    9/28 9/30 10/04 10/6 10/11   Kinesio taping to left [facility] joint in a "Y" pattern with base of "Y" at deltoid tuberosity and tails at anterior and posterior deltoid musculature with tails at 50 % tension and an additional "I" strip perpendicular to the base of the "Y" with a diagonal upward force at 50 % tension NT NT NT   NT   NT NT                Neuro Re-Ed                                                    Ther Ex                          Nu step  10 MIN  10 MIN  10 min    10 min 10 min 10 min 8 MIN  8 min   Pulleys 3 MIN  4 MIN  5 min   5 min 5 min 5 min 5 MIN  5 min   Doorway pec stretch at 45 degrees:B: 10X 10SEC  10X 10SEC  10 sec x 10   10 sec x 10 10 sec x 10 10 sec x 10 10X 10SEC  10 sec x 10   Scapular squeezes:B: 20X 3SEC  20X 3SEC  3 sec x 20   3 sec x 20 3 sec x 20 3 sec x 20 20X 3SEC  3 sec x 20   Seated postural correction slough over correct  20X  20X  2 x 10   3 sec x 20 3 sec x 20 NT NT 2 x 10   Seated table slides:L flexion and scaption:L 20X  20X  2 x 10   2 x 10 2 x 10 2 x 10 20X 2 x 10 and wall slides   Seated shoulder ER stretch with wedge:L 20X 3SEC  20X  NT   10 sec x 10 10 sec x 10 10 sec x 10 10X 10SEC  10 sec x 10   Cervical spine retraction 20X  20X 3SEC  NT 2 x 10 2 x 10 NT 20X 2 x 10                Standing shoulder scaption and flexion:B: 20X 2#  20X 2#   2# x 20   1 5# x 20 1 5# x 20 2# x 20 20X 2#  2# x 20   Biceps curls:B: 20X 4#   20X 4#  4# x 20   3# x 20 3# x 20 4# x 20 20X 4# 4# x 20   Shoulder abduction with spc:L NT NT NT   NT NT NT NT NT   Shoulder ER with spc:L NT NT NT   NT NT NT NT NT                [place] rows and extension:B: NT NT NT   Rows 9# x 20 and extension 7 # x 20 Rows 9# x 20 and extension 6# x 20  NT NT NT                 Supine shoulder flexion with bench press 20X 4#  20X 4#  4# x 20   3# x 20 2 x 10 NT 20X 3#  4# x 20   Supine scap punches and triceps extension:L 20X 3#  20X 3#  4# x 20   1 5# x 20 1 5# x 20 NT 20X 3#  3# x 20   Right side lying left shoulder ER and abduction 20X 3#  20X 3#  2# x 20   NT NT NT 20X  2 x 10                Prone shoulder extension, horizontal abduction and scaption:L NT NT NT   NT NT NT NT NT                                                                                              Ther Activity                                       Gait Training                                       Modalities             MHP to left GH joint while seated PRN NT NT

## 2022-10-20 NOTE — LETTER
2022    Chasity Esparza DO  [address] [facility]    Patient: Marisa Del Rio   YOB: 1935   Date of Visit: 10/20/2022     Encounter Diagnosis     ICD-10-CM    1  Chronic left shoulder pain  M25 512     G89 29        Dear Dr Brenda Torres:    Thank you for your recent referral of Marias Del Rio  Please review the attached evaluation summary from Dori's recent visit  Please verify that you agree with the plan of care by signing the attached order  If you have any questions or concerns, please do not hesitate to call  I sincerely appreciate the opportunity to share in the care of one of your patients and hope to have another opportunity to work with you in the near future  Sincerely,    Kary Matthews, PT      Referring Provider:      I certify that I have read the below Plan of Care and certify the need for these services furnished under this plan of treatment while under my care  Chasity Esparza DO  [address] [place] [address]  Via Fax: [phone]          PT Evaluation  / PT Reassessment / PT Discharge    Today's date: 10/20/2022  Patient name: Marisa Del Rio  : 1935  MRN: [id number]  Referring provider: Shyann Johnson DO  Dx:   Encounter Diagnosis     ICD-10-CM    1  Chronic left shoulder pain  M25 512     G89 29                   Assessment  Assessment details: PT Reassessment: 10/20/2022  Patient reported the following progress since onset of PT: dressing tops and coats / donning tops, reaching, lifting, pain reduction and left shoulder strength and mobility  But, she noted the following deficits that still persists: pain with doffing tops and coats, heavy lifting and left upper extremity pain and weakness  PT Reassessment:    Patient reported the following progress since onset of PT: decrease in left shoulder pain, increase in left ue mobility and strength, dressing, reaching and lifting improvements  But, she noted the following deficits that still persists:  Undergarment dressing, unable to wash and dry her back, reaching and lifting based functional activities  PT IE: 09/08/2022  Patient reported onset of left shoulder pain initially occurred last year  Patient noted she had a Cortizone injection in December of 2021 and another one on July of 2022 which had not effect  Patient noted she landed against the wall and aggravated it  Patient noted she has the following deficits due to left shoulder pain: reaching, lifting, lowering items from and elevated, donning and doffing her clothing, reaching back to dress and wash her back, left side lying  Patient noted sitting will reduce her pain  Patient noted she does have intermittent aching pain  Patient noted the following factors reduce her pain: heat, pain patch and OTC pain medication  Patient noted she is right ue dominant  Patient noted the pain location is superior and anterior left GH joint region  Patient denies left ue paresthesias  Patient noted x-ray was - for bony involvement  Patient noted left ue feels weak as well  Patient reported using right ue to lower left ue during lifting activities in pain reducing  Impairments: abnormal or restricted ROM, abnormal movement, activity intolerance, lacks appropriate home exercise program and pain with function  Understanding of Dx/Px/POC: excellent   Prognosis: good  Prognosis details: Patient is a 80y o  year old female seen for outpatient PT reevaluation with left shoulder pain due to bursitis and RTC tendinopathy  Patient presents with the following progress since onset of PT: decrease in left GH joint pain, increase in left ue mobility and strength, decrease in TTP, decrease in special tests and functional progress  Thus, due to functional an impairment progress patient requests d/c to hep  Thus, patient provided with final written and verbal hep and d/c to hep    Thank you for the referral      Goals  Short Term goals - 4 weeks  1  Patient will be independent HEP   MET  2   Patient will report a 25 - 50% decrease in pain complaints  MET  3   Increase strength 1/2 grade  MET  4   Increase ROM 5-10 degrees  MET  Long Term goals - 8 weeks  1  Patient will report elimination of pain complaints  Partially MET  2   Patient will return to all recreational activities without restriction  Partially MET  3   ROM WFL  Partially MET  4   Strength 5/5  Partially MET  5   Patient will report reaching is improved by > 50 % prior to left shoulder symptoms and limitations  Partially MET  6   Patient will report house hold lifting activities are improved by > 50 % prior to left shoulder symptoms and limitations  Partially MET  7   Patient will report ability to lower house hold activities like dishes and cups are improved by > 50 % prior to left shoulder symptoms and limitations  Partially MET  8   Patient will report ability to don and doff her clothing with > 50 % improvement prior to left shoulder symptoms and limitations  Partially MET  9   Patient will report ability to wash and dress herself with > 50 % improvement prior to left shoulder symptoms and limitations  Partially MET      Plan  Patient would benefit from: skilled physical therapy  Planned modality interventions: cryotherapy, TENS, thermotherapy: hydrocollator packs, ultrasound and unattended electrical stimulation  Planned therapy interventions: joint mobilization, manual therapy, massage, balance, balance/weight bearing training, neuromuscular re-education, patient education, body mechanics training, self care, strengthening, stretching, therapeutic activities, therapeutic exercise, therapeutic training, transfer training, flexibility, functional ROM exercises, gait training, graded activity, graded exercise, graded motor, home exercise program and compression  Frequency: 2x week  Duration in weeks: 8  Treatment plan discussed with: patient        Subjective Evaluation    History of Present Illness  Mechanism of injury: recautions: Patient's PMHx is remarkable for GERD without esophagitis, Aortic stenosis, mild, DJD (degenerative joint disease), multiple sites, Osteoporosis, Psoriasis, Glaucoma, and HTN  Pain  At best pain ratin  At worst pain ratin  Location: Superior and Anterior Left shoulder    Patient Goals  Patient goals for therapy: decreased pain, increased motion, increased strength, independence with ADLs/IADLs and return to sport/leisure activities          Objective     Tenderness     Additional Tenderness Details  Patient is - TTP at left posterior acromion process  Patient exhibits protracted cervical spine posture at minimal to moderate levels  Active Range of Motion   Left Shoulder   Flexion: 150 degrees with pain  Abduction: 144 degrees with pain  External rotation 90°: 56 degrees with pain  Internal rotation 90°: 32 degrees with pain    Right Shoulder   Flexion: 162 degrees   Abduction: 154 degrees   External rotation 90°: 64 degrees  Internal rotation 90°: 52 degrees     Strength/Myotome Testing     Left Shoulder     Planes of Motion   Flexion: 4   Abduction: 4-   External rotation at 0°: 4-   Internal rotation at 0°: 4+     Right Shoulder     Planes of Motion   Flexion: 4   Abduction: 4   External rotation at 0°: 4   Internal rotation at 0°: 4     Left Elbow   Flexion: 5  Extension: 4+    Right Elbow   Flexion: 5  Extension: 4+    Tests     Left Shoulder   Positive empty can and Hawkin's  Negative painful arc                         Precautions: Patient's PMHx is remarkable for GERD without esophagitis, Aortic stenosis, mild, DJD (degenerative joint disease), multiple sites, Osteoporosis, Psoriasis, Glaucoma, and HTN      Manuals  10/13 10/19 10/20    9/28 9/30 10/04 10/6 10/11   Kinesio taping to left [address] joint in a "Y" pattern with base of "Y" at deltoid tuberosity and tails at anterior and posterior deltoid musculature with tails at 50 % tension and an additional "I" strip perpendicular to the base of the "Y" with a diagonal upward force at 50 % tension NT NT NT   NT   NT NT                Neuro Re-Ed                                                    Ther Ex                          Nu step  10 MIN  10 MIN  10 min    10 min 10 min 10 min 8 MIN  8 min   Pulleys 3 MIN  4 MIN  5 min   5 min 5 min 5 min 5 MIN  5 min   Doorway pec stretch at 45 degrees:B: 10X 10SEC  10X 10SEC  10 sec x 10   10 sec x 10 10 sec x 10 10 sec x 10 10X 10SEC  10 sec x 10   Scapular squeezes:B: 20X 3SEC  20X 3SEC  3 sec x 20   3 sec x 20 3 sec x 20 3 sec x 20 20X 3SEC  3 sec x 20   Seated postural correction slough over correct  20X  20X  2 x 10   3 sec x 20 3 sec x 20 NT NT 2 x 10   Seated table slides:L flexion and scaption:L 20X  20X  2 x 10   2 x 10 2 x 10 2 x 10 20X 2 x 10 and wall slides   Seated shoulder ER stretch with wedge:L 20X 3SEC  20X  NT   10 sec x 10 10 sec x 10 10 sec x 10 10X 10SEC  10 sec x 10   Cervical spine retraction 20X  20X 3SEC  NT    2 x 10 2 x 10 NT 20X 2 x 10                Standing shoulder scaption and flexion:B: 20X 2#  20X 2#   2# x 20   1 5# x 20 1 5# x 20 2# x 20 20X 2#  2# x 20   Biceps curls:B: 20X 4#   20X 4#  4# x 20   3# x 20 3# x 20 4# x 20 20X 4# 4# x 20   Shoulder abduction with spc:L NT NT NT   NT NT NT NT NT   Shoulder ER with spc:L NT NT NT   NT NT NT NT NT                Crockett rows and extension:B: NT NT NT   Rows 9# x 20 and extension 7 # x 20 Rows 9# x 20 and extension 6# x 20  NT NT NT                 Supine shoulder flexion with bench press 20X 4#  20X 4#  4# x 20   3# x 20 2 x 10 NT 20X 3#  4# x 20   Supine scap punches and triceps extension:L 20X 3#  20X 3#  4# x 20   1 5# x 20 1 5# x 20 NT 20X 3#  3# x 20   Right side lying left shoulder ER and abduction 20X 3#  20X 3#  2# x 20   NT NT NT 20X  2 x 10                Prone shoulder extension, horizontal abduction and scaption:L NT NT NT   NT NT NT NT NT                                                                                              Ther Activity                                       Gait Training                                       Modalities             MHP to left [employer] joint while seated PRN NT NT

## 2022-10-21 ENCOUNTER — APPOINTMENT (OUTPATIENT)
Dept: PHYSICAL THERAPY | Age: 87
End: 2022-10-21

## 2022-10-24 ENCOUNTER — APPOINTMENT (OUTPATIENT)
Dept: PHYSICAL THERAPY | Age: 87
End: 2022-10-24

## 2022-10-25 ENCOUNTER — APPOINTMENT (OUTPATIENT)
Dept: PHYSICAL THERAPY | Age: 87
End: 2022-10-25

## 2022-11-09 DIAGNOSIS — M15.9 PRIMARY OSTEOARTHRITIS INVOLVING MULTIPLE JOINTS: ICD-10-CM

## 2022-11-10 RX ORDER — CELECOXIB 100 MG/1
CAPSULE ORAL
Qty: 90 CAPSULE | Refills: 3 | Status: SHIPPED | OUTPATIENT
Start: 2022-11-10

## 2022-11-21 ENCOUNTER — HOSPITAL ENCOUNTER (OUTPATIENT)
Dept: NON INVASIVE DIAGNOSTICS | Facility: CLINIC | Age: 87
Discharge: HOME | End: 2022-11-21

## 2022-11-21 VITALS
DIASTOLIC BLOOD PRESSURE: 72 MMHG | WEIGHT: 167 LBS | HEART RATE: 68 BPM | HEIGHT: 62 IN | BODY MASS INDEX: 30.73 KG/M2 | SYSTOLIC BLOOD PRESSURE: 110 MMHG

## 2022-11-21 DIAGNOSIS — I35.0 AORTIC STENOSIS, MILD: ICD-10-CM

## 2022-11-22 LAB
AORTIC ROOT: 2.5 CM
AORTIC VALVE MEAN VELOCITY: 16.1 M/S
APICAL FOUR CHAMBER EJECTION FRACTION: 61 %
ASCENDING AORTA: 3.2 CM
AV AREA BY CONTINUOUS VTI: 2 CM2
AV AREA PEAK VELOCITY: 1.9 CM2
AV LVOT MEAN GRADIENT: 6 MMHG
AV LVOT PEAK GRADIENT: 12 MMHG
AV MEAN GRADIENT: 12 MMHG
AV PEAK GRADIENT: 21 MMHG
AV VALVE AREA: 1.98 CM2
AV VELOCITY RATIO: 0.74
DOP CALC AO PEAK VEL: 2.29 M/S
DOP CALC AO VTI: 53.91 CM
DOP CALC LVOT AREA: 2.54 CM2
DOP CALC LVOT DIAMETER: 1.8 CM
DOP CALC LVOT PEAK VEL VTI: 42.01 CM
DOP CALC LVOT PEAK VEL: 1.7 M/S
DOP CALC LVOT STROKE INDEX: 61 ML/M2
DOP CALC LVOT STROKE VOLUME: 106.85 CM3
E WAVE DECELERATION TIME: 237 MS
FRACTIONAL SHORTENING: 32 % (ref 28–44)
INTERVENTRICULAR SEPTUM IN DIASTOLE (PARASTERNAL SHORT AXIS VIEW): 1.2 CM
INTERVENTRICULAR SEPTUM: 1.2 CM (ref 0.6–1.1)
LAAS-AP2: 23.5 CM2
LAAS-AP4: 27.8 CM2
LEFT ATRIUM SIZE: 4 CM
LEFT INTERNAL DIMENSION IN SYSTOLE: 2.5 CM (ref 2.1–4)
LEFT VENTRICULAR INTERNAL DIMENSION IN DIASTOLE: 3.7 CM (ref 3.5–6)
LEFT VENTRICULAR POSTERIOR WALL IN END DIASTOLE: 0.9 CM
LEFT VENTRICULAR STROKE VOLUME: 36 ML
LVSV (TEICH): 36 ML
MV E'TISSUE VEL-SEP: 5 CM/S
MV PEAK A VEL: 0.87 M/S
MV PEAK E VEL: 113 CM/S
MV STENOSIS PRESSURE HALF TIME: 69 MS
MV VALVE AREA P 1/2 METHOD: 3.19 CM2
RA PRESSURE ESTIMATED: 5 MMHG
RIGHT ATRIUM AREA SYSTOLE A4C: 14.9 CM2
RIGHT VENTRICLE ID DIMENSION: 3.1 CM
RV PSP: 42 MMHG
SL CV LEFT ATRIUM LENGTH A2C: 6.3 CM
SL CV LV EF: 65
SL CV PED ECHO LEFT VENTRICLE DIASTOLIC VOLUME (MOD BIPLANE) 2D: 58 ML
SL CV PED ECHO LEFT VENTRICLE SYSTOLIC VOLUME (MOD BIPLANE) 2D: 21 ML
TR MAX PG: 37 MMHG
TR PEAK VELOCITY: 3 M/S
TRICUSPID VALVE PEAK REGURGITATION VELOCITY: 3.02 M/S

## 2022-12-06 ENCOUNTER — OFFICE VISIT (OUTPATIENT)
Dept: PODIATRY | Facility: CLINIC | Age: 87
End: 2022-12-06

## 2022-12-06 VITALS
BODY MASS INDEX: 30.8 KG/M2 | WEIGHT: 167.4 LBS | DIASTOLIC BLOOD PRESSURE: 73 MMHG | HEART RATE: 77 BPM | HEIGHT: 62 IN | SYSTOLIC BLOOD PRESSURE: 128 MMHG

## 2022-12-06 DIAGNOSIS — I73.9 PERIPHERAL VASCULAR DISEASE, UNSPECIFIED (HCC): Primary | ICD-10-CM

## 2022-12-06 DIAGNOSIS — B35.1 ONYCHOMYCOSIS: ICD-10-CM

## 2022-12-06 NOTE — PROGRESS NOTES
PATIENT:  Brenda Talamantes  1935    ASSESSMENT/PLAN:  1  Peripheral vascular disease, unspecified ([address] )        2  Onychomycosis  Debridement           Patient was counseled on the condition and diagnosis  Educated proper daily foot care and exam   Instructed proper skin care / protection and footwear  The patient will return in 9 weeks  PROCEDURE:  All mycotic toenails were reduced and debrided in length, width, and girth using a nail nipper and dremel  Patient tolerated procedure(s) well without complications  HPI:  Brenda Talamanets is a 80 y  o year old female seen for at risk foot care  She has class findings  She has chronic nail fungus with tenderness  The patient denied any acute pedal disorder, redness, acute swelling, or recent injury  PAST MEDICAL HISTORY:  Past Medical History:   Diagnosis Date   • Hypertension    • Osteoporosis        PAST SURGICAL HISTORY:  History reviewed  No pertinent surgical history  ALLERGIES:  Patient has no known allergies  MEDICATIONS:  Current Outpatient Medications   Medication Sig Dispense Refill   • brimonidine (Alphagan P) 0 1 %      • celecoxib (CeleBREX) 100 mg capsule TAKE 1 CAPSULE DAILY 90 capsule 3   • HALOG 0 1 % CREA      • omeprazole (PriLOSEC) 20 mg delayed release capsule Take 1 capsule (20 mg total) by mouth daily 90 capsule 3   • rosuvastatin (CRESTOR) 10 MG tablet Take 1 tablet (10 mg total) by mouth daily 90 tablet 3   • TRAVATAN Z 0 004 % ophthalmic solution        No current facility-administered medications for this visit  SOCIAL HISTORY:  Social History     Socioeconomic History   • Marital status:       Spouse name: None   • Number of children: None   • Years of education: None   • Highest education level: None   Occupational History   • None   Tobacco Use   • Smoking status: Former     Packs/day: 0 50     Types: Cigarettes   • Smokeless tobacco: Former   • Tobacco comments:     Patient reports she smoked for 20 years   Vaping Use   • Vaping Use: Never used   Substance and Sexual Activity   • Alcohol use: Yes   • Drug use: No   • Sexual activity: Not Currently   Other Topics Concern   • None   Social History Narrative   • None     Social Determinants of Health     Financial Resource Strain: Medium Risk   • Difficulty of Paying Living Expenses: Somewhat hard   Food Insecurity: Not on file   Transportation Needs: Unmet Transportation Needs   • Lack of Transportation (Medical): Yes   • Lack of Transportation (Non-Medical): Yes   Physical Activity: Not on file   Stress: Not on file   Social Connections: Not on file   Intimate Partner Violence: Not on file   Housing Stability: Not on file        REVIEW OF SYSTEMS:  GENERAL: No fever or chills  HEART: No chest pain, or palpitation  RESPIRATORY:  No acute SOB or cough  GI: No Nausea, vomit or diarrhea  NEUROLOGIC: No syncope or acute weakness    PHYSICAL EXAM:    /73   Pulse 77   Ht 5' 2" (1 575 m)   Wt 75 9 kg (167 lb 6 4 oz)   BMI 30 62 kg/m²     VASCULAR EXAM  Dorsalis pedis  +1, Posterior tibial artery  absent  The patient has class findings with skin atrophy, lack of digital hair, and nail dystrophy  There is trace lower extremity edema bilaterally  NEUROLOGIC EXAM  AAO X 3  Sensation is intact to light touch  No focal neurologic deficit  DERMATOLOGIC EXAM:   No ulcer  No abscess  No cellulitis noted  The patient has hypertrophic toenails X 8 with discoloration, onycholysis, and subungal debris  MUSCULOSKELETAL EXAM:   No acute joint pain  No acute edema, or redness  No acute musculoskeletal problem  Patient has deformity including bunion, right worse than left  Decreased 1st MPJ ROM

## 2023-01-06 ENCOUNTER — TELEPHONE (OUTPATIENT)
Dept: INTERNAL MEDICINE CLINIC | Facility: CLINIC | Age: 88
End: 2023-01-06

## 2023-01-06 DIAGNOSIS — R09.81 NASAL CONGESTION: Primary | ICD-10-CM

## 2023-01-06 RX ORDER — CEPHALEXIN 250 MG/1
250 CAPSULE ORAL 3 TIMES DAILY
Qty: 21 CAPSULE | Refills: 0 | Status: SHIPPED | OUTPATIENT
Start: 2023-01-06 | End: 2023-01-13

## 2023-01-06 NOTE — TELEPHONE ENCOUNTER
Head cold  Eye tearing and nose running,    Going down into chest some she says  shes been away for 3 weeks  Asking if theres anything we could send in for her for this?   shes down in [place] and wont drive up here    shes done claritan d and mucinex so far       Please advise on next step for her please?

## 2023-02-09 ENCOUNTER — FOLLOW UP (OUTPATIENT)
Dept: URBAN - METROPOLITAN AREA CLINIC 6 | Facility: CLINIC | Age: 88
End: 2023-02-09

## 2023-02-09 DIAGNOSIS — Z96.1: ICD-10-CM

## 2023-02-09 DIAGNOSIS — H40.1132: ICD-10-CM

## 2023-02-09 PROCEDURE — 92015 DETERMINE REFRACTIVE STATE: CPT

## 2023-02-09 PROCEDURE — 92012 INTRM OPH EXAM EST PATIENT: CPT

## 2023-02-09 PROCEDURE — 92020 GONIOSCOPY: CPT

## 2023-02-09 ASSESSMENT — TONOMETRY
OS_IOP_MMHG: 17
OD_IOP_MMHG: 17

## 2023-02-09 ASSESSMENT — VISUAL ACUITY
OU_CC: J1-2
OS_SC: 20/40-2
OD_SC: CF 2FT

## 2023-04-04 ENCOUNTER — RA CDI HCC (OUTPATIENT)
Dept: OTHER | Facility: HOSPITAL | Age: 88
End: 2023-04-04

## 2023-04-04 NOTE — PROGRESS NOTES
[address]  coding opportunities       Chart reviewed, no opportunity found: CHART REVIEWED, NO OPPORTUNITY FOUND        Patients Insurance     Medicare Insurance: Medicare

## 2023-04-06 ENCOUNTER — APPOINTMENT (OUTPATIENT)
Dept: LAB | Age: 88
End: 2023-04-06

## 2023-04-06 DIAGNOSIS — E78.2 MIXED HYPERLIPIDEMIA: ICD-10-CM

## 2023-04-06 DIAGNOSIS — R73.01 IMPAIRED FASTING GLUCOSE: ICD-10-CM

## 2023-04-06 LAB
BASOPHILS # BLD AUTO: 0.04 THOUSANDS/ÂΜL (ref 0–0.1)
BASOPHILS NFR BLD AUTO: 1 % (ref 0–1)
EOSINOPHIL # BLD AUTO: 0.14 THOUSAND/ÂΜL (ref 0–0.61)
EOSINOPHIL NFR BLD AUTO: 4 % (ref 0–6)
ERYTHROCYTE [DISTWIDTH] IN BLOOD BY AUTOMATED COUNT: 13.2 % (ref 11.6–15.1)
EST. AVERAGE GLUCOSE BLD GHB EST-MCNC: 128 MG/DL
HBA1C MFR BLD: 6.1 %
HCT VFR BLD AUTO: 38.8 % (ref 34.8–46.1)
HGB BLD-MCNC: 12.5 G/DL (ref 11.5–15.4)
IMM GRANULOCYTES # BLD AUTO: 0.01 THOUSAND/UL (ref 0–0.2)
IMM GRANULOCYTES NFR BLD AUTO: 0 % (ref 0–2)
LYMPHOCYTES # BLD AUTO: 1.55 THOUSANDS/ÂΜL (ref 0.6–4.47)
LYMPHOCYTES NFR BLD AUTO: 42 % (ref 14–44)
MCH RBC QN AUTO: 31.3 PG (ref 26.8–34.3)
MCHC RBC AUTO-ENTMCNC: 32.2 G/DL (ref 31.4–37.4)
MCV RBC AUTO: 97 FL (ref 82–98)
MONOCYTES # BLD AUTO: 0.46 THOUSAND/ÂΜL (ref 0.17–1.22)
MONOCYTES NFR BLD AUTO: 13 % (ref 4–12)
NEUTROPHILS # BLD AUTO: 1.48 THOUSANDS/ÂΜL (ref 1.85–7.62)
NEUTS SEG NFR BLD AUTO: 40 % (ref 43–75)
NRBC BLD AUTO-RTO: 0 /100 WBCS
PLATELET # BLD AUTO: 213 THOUSANDS/UL (ref 149–390)
PMV BLD AUTO: 10.9 FL (ref 8.9–12.7)
RBC # BLD AUTO: 3.99 MILLION/UL (ref 3.81–5.12)
WBC # BLD AUTO: 3.68 THOUSAND/UL (ref 4.31–10.16)

## 2023-04-07 LAB
ALBUMIN SERPL BCP-MCNC: 4 G/DL (ref 3.5–5)
ALP SERPL-CCNC: 67 U/L (ref 46–116)
ALT SERPL W P-5'-P-CCNC: 22 U/L (ref 12–78)
ANION GAP SERPL CALCULATED.3IONS-SCNC: 3 MMOL/L (ref 4–13)
AST SERPL W P-5'-P-CCNC: 24 U/L (ref 5–45)
BILIRUB SERPL-MCNC: 0.58 MG/DL (ref 0.2–1)
BUN SERPL-MCNC: 17 MG/DL (ref 5–25)
CALCIUM SERPL-MCNC: 9.5 MG/DL (ref 8.3–10.1)
CHLORIDE SERPL-SCNC: 105 MMOL/L (ref 96–108)
CHOLEST SERPL-MCNC: 228 MG/DL
CO2 SERPL-SCNC: 30 MMOL/L (ref 21–32)
CREAT SERPL-MCNC: 0.75 MG/DL (ref 0.6–1.3)
GFR SERPL CREATININE-BSD FRML MDRD: 71 ML/MIN/1.73SQ M
GLUCOSE P FAST SERPL-MCNC: 93 MG/DL (ref 65–99)
HDLC SERPL-MCNC: 131 MG/DL
LDLC SERPL CALC-MCNC: 86 MG/DL (ref 0–100)
NONHDLC SERPL-MCNC: 97 MG/DL
POTASSIUM SERPL-SCNC: 4.7 MMOL/L (ref 3.5–5.3)
PROT SERPL-MCNC: 7.2 G/DL (ref 6.4–8.4)
SODIUM SERPL-SCNC: 138 MMOL/L (ref 135–147)
TRIGL SERPL-MCNC: 53 MG/DL

## 2023-06-27 ENCOUNTER — OFFICE VISIT (OUTPATIENT)
Dept: PODIATRY | Facility: CLINIC | Age: 88
End: 2023-06-27
Payer: MEDICARE

## 2023-06-27 VITALS
SYSTOLIC BLOOD PRESSURE: 136 MMHG | HEART RATE: 72 BPM | HEIGHT: 62 IN | DIASTOLIC BLOOD PRESSURE: 78 MMHG | WEIGHT: 163 LBS | BODY MASS INDEX: 30 KG/M2

## 2023-06-27 DIAGNOSIS — B35.1 ONYCHOMYCOSIS: ICD-10-CM

## 2023-06-27 DIAGNOSIS — I73.9 PERIPHERAL VASCULAR DISEASE, UNSPECIFIED (HCC): Primary | ICD-10-CM

## 2023-06-27 PROCEDURE — 11721 DEBRIDE NAIL 6 OR MORE: CPT | Performed by: PODIATRIST

## 2023-06-27 NOTE — PROGRESS NOTES
PATIENT:  Keysha Szymanski  1935    ASSESSMENT/PLAN:  1  Peripheral vascular disease, unspecified ([address] )        2  Onychomycosis  Debridement           Patient was counseled on the condition and diagnosis  Educated proper daily foot care and exam   Instructed proper skin care / protection and footwear  The patient will return in 9 weeks  PROCEDURE:  All mycotic toenails were reduced and debrided in length, width, and girth using a nail nipper and dremel  Patient tolerated procedure(s) well without complications  HPI:  Keysha Szymanski is a 80 y  o year old female seen for at risk foot care  She has class findings  She has chronic nail fungus with tenderness  No new complaint  The patient denied any acute pedal disorder  PAST MEDICAL HISTORY:  Past Medical History:   Diagnosis Date   • Hypertension    • Osteoporosis        PAST SURGICAL HISTORY:  History reviewed  No pertinent surgical history  ALLERGIES:  Patient has no known allergies  MEDICATIONS:  Current Outpatient Medications   Medication Sig Dispense Refill   • brimonidine (Alphagan P) 0 1 %      • celecoxib (CeleBREX) 100 mg capsule Take 1 capsule (100 mg total) by mouth daily 90 capsule 3   • HALOG 0 1 % CREA      • omeprazole (PriLOSEC) 20 mg delayed release capsule Take 1 capsule (20 mg total) by mouth daily 90 capsule 3   • rosuvastatin (CRESTOR) 10 MG tablet Take 1 tablet (10 mg total) by mouth daily 90 tablet 3   • TRAVATAN Z 0 004 % ophthalmic solution        No current facility-administered medications for this visit  SOCIAL HISTORY:  Social History     Socioeconomic History   • Marital status:       Spouse name: None   • Number of children: None   • Years of education: None   • Highest education level: None   Occupational History   • None   Tobacco Use   • Smoking status: Former     Packs/day: 0 50     Types: Cigarettes   • Smokeless tobacco: Former   • Tobacco comments:     Patient reports she smoked for 20 years   Vaping Use   • Vaping Use: Never used   Substance and Sexual Activity   • Alcohol use: Yes   • Drug use: No   • Sexual activity: Not Currently   Other Topics Concern   • None   Social History Narrative   • None     Social Determinants of Health     Financial Resource Strain: Medium Risk (9/27/2022)    Overall Financial Resource Strain (CARDIA)    • Difficulty of Paying Living Expenses: Somewhat hard   Food Insecurity: Not on file   Transportation Needs: Unmet Transportation Needs (9/27/2022)    PRAPARE - Transportation    • Lack of Transportation (Medical): Yes    • Lack of Transportation (Non-Medical): Yes   Physical Activity: Not on file   Stress: Not on file   Social Connections: Not on file   Intimate Partner Violence: Not on file   Housing Stability: Not on file        REVIEW OF SYSTEMS:  GENERAL: No fever or chills  HEART: No chest pain, or palpitation  RESPIRATORY:  No acute SOB or cough  GI: No Nausea, vomit or diarrhea  NEUROLOGIC: No syncope or acute weakness    PHYSICAL EXAM:    There were no vitals taken for this visit  VASCULAR EXAM  Dorsalis pedis  +1, Posterior tibial artery  absent  The patient has class findings with skin atrophy, lack of digital hair, and nail dystrophy  There is trace lower extremity edema bilaterally  NEUROLOGIC EXAM  AAO X 3  Sensation is intact to light touch  No focal neurologic deficit  DERMATOLOGIC EXAM:   No ulcer  No abscess  No cellulitis noted  The patient has hypertrophic toenails X 8 with discoloration, onycholysis, and subungal debris  MUSCULOSKELETAL EXAM:   No acute joint pain  No acute edema, or redness  No acute musculoskeletal problem  Patient has deformity including bunion, right worse than left

## 2023-07-10 ENCOUNTER — TELEPHONE (OUTPATIENT)
Dept: INTERNAL MEDICINE CLINIC | Facility: CLINIC | Age: 88
End: 2023-07-10

## 2023-07-10 NOTE — TELEPHONE ENCOUNTER
Patient explained at her last visit that she was getting leg cramps so she did what you told her to do. Patient was taking the magnesium and the cramps went away. The leg cramps have come back and she upped the Magnesium but she is still getting them pretty bad. Patient wanted you to know that she is not dehydrated and she is drinking plenty of water. Please advise. .. Juan Miguel Nazario

## 2023-07-10 NOTE — TELEPHONE ENCOUNTER
Spoke with the patient and told her, as per Dr. Fernandez Whelan, to discontinue the Rosuvastatin. She verbalized understanding. She will monitor the cramping over the next few days to see if it subsides.

## 2023-07-31 ENCOUNTER — ESTABLISHED COMPREHENSIVE EXAM (OUTPATIENT)
Dept: URBAN - METROPOLITAN AREA CLINIC 6 | Facility: CLINIC | Age: 88
End: 2023-07-31

## 2023-07-31 DIAGNOSIS — Z96.1: ICD-10-CM

## 2023-07-31 DIAGNOSIS — H35.033: ICD-10-CM

## 2023-07-31 DIAGNOSIS — H40.1132: ICD-10-CM

## 2023-07-31 DIAGNOSIS — H34.8130: ICD-10-CM

## 2023-07-31 DIAGNOSIS — H35.373: ICD-10-CM

## 2023-07-31 PROCEDURE — 92014 COMPRE OPH EXAM EST PT 1/>: CPT

## 2023-07-31 ASSESSMENT — TONOMETRY
OD_IOP_MMHG: 17
OS_IOP_MMHG: 14

## 2023-07-31 ASSESSMENT — VISUAL ACUITY
OD_SC: CF 4FT
OS_SC: 20/50+3

## 2023-08-29 ENCOUNTER — OFFICE VISIT (OUTPATIENT)
Dept: PODIATRY | Facility: CLINIC | Age: 88
End: 2023-08-29
Payer: MEDICARE

## 2023-08-29 VITALS
SYSTOLIC BLOOD PRESSURE: 135 MMHG | HEART RATE: 80 BPM | BODY MASS INDEX: 30 KG/M2 | HEIGHT: 62 IN | DIASTOLIC BLOOD PRESSURE: 78 MMHG | WEIGHT: 163 LBS

## 2023-08-29 DIAGNOSIS — B35.1 ONYCHOMYCOSIS: ICD-10-CM

## 2023-08-29 DIAGNOSIS — I73.9 PERIPHERAL VASCULAR DISEASE, UNSPECIFIED (HCC): Primary | ICD-10-CM

## 2023-08-29 PROCEDURE — 11721 DEBRIDE NAIL 6 OR MORE: CPT | Performed by: PODIATRIST

## 2023-08-29 NOTE — PROGRESS NOTES
PATIENT:  Kendy Thomas  1935    ASSESSMENT/PLAN:  1. Peripheral vascular disease, unspecified ([address])        2. Onychomycosis  Debridement           Patient was counseled on the condition and diagnosis. Educated proper daily foot care and exam.  Instructed proper skin care / protection and footwear. The patient will return in 9 weeks. PROCEDURE:  All mycotic toenails were reduced and debrided in length, width, and girth using a nail nipper and dremel. Patient tolerated procedure(s) well without complications. HPI:  Kendy Thomas is a 80 y. o.year old female seen for at risk foot care. She has class findings. She has chronic nail fungus with tenderness. No new complaint. The patient denied any acute pedal disorder. PAST MEDICAL HISTORY:  Past Medical History:   Diagnosis Date   • Hypertension    • Osteoporosis        PAST SURGICAL HISTORY:  History reviewed. No pertinent surgical history. ALLERGIES:  Patient has no known allergies. MEDICATIONS:  Current Outpatient Medications   Medication Sig Dispense Refill   • brimonidine (Alphagan P) 0.1 %      • celecoxib (CeleBREX) 100 mg capsule Take 1 capsule (100 mg total) by mouth daily 90 capsule 3   • HALOG 0.1 % CREA      • omeprazole (PriLOSEC) 20 mg delayed release capsule Take 1 capsule (20 mg total) by mouth daily 90 capsule 3   • rosuvastatin (CRESTOR) 10 MG tablet Take 1 tablet (10 mg total) by mouth daily 90 tablet 3   • TRAVATAN Z 0.004 % ophthalmic solution        No current facility-administered medications for this visit. SOCIAL HISTORY:  Social History     Socioeconomic History   • Marital status:       Spouse name: None   • Number of children: None   • Years of education: None   • Highest education level: None   Occupational History   • None   Tobacco Use   • Smoking status: Former     Packs/day: 0.50     Types: Cigarettes   • Smokeless tobacco: Former   • Tobacco comments:     Patient reports she smoked for 20 years   Vaping Use   • Vaping Use: Never used   Substance and Sexual Activity   • Alcohol use: Yes   • Drug use: No   • Sexual activity: Not Currently   Other Topics Concern   • None   Social History Narrative   • None     Social Determinants of Health     Financial Resource Strain: Medium Risk (9/27/2022)    Overall Financial Resource Strain (CARDIA)    • Difficulty of Paying Living Expenses: Somewhat hard   Food Insecurity: Not on file   Transportation Needs: Unmet Transportation Needs (9/27/2022)    PRAPARE - Transportation    • Lack of Transportation (Medical): Yes    • Lack of Transportation (Non-Medical): Yes   Physical Activity: Not on file   Stress: Not on file   Social Connections: Not on file   Intimate Partner Violence: Not on file   Housing Stability: Not on file        REVIEW OF SYSTEMS:  GENERAL: No fever or chills  HEART: No chest pain, or palpitation  RESPIRATORY:  No acute SOB or cough  GI: No Nausea, vomit or diarrhea  NEUROLOGIC: No syncope or acute weakness    PHYSICAL EXAM:    /78   Pulse 80   Ht 5' 2" (1.575 m)   Wt 73.9 kg (163 lb)   BMI 29.81 kg/m²     VASCULAR EXAM  Dorsalis pedis  +1, Posterior tibial artery  absent. The patient has class findings with skin atrophy, lack of digital hair, and nail dystrophy. There is trace lower extremity edema bilaterally. NEUROLOGIC EXAM  AAO X 3. Sensation is intact to light touch. No focal neurologic deficit. DERMATOLOGIC EXAM:   No ulcer. No abscess. No cellulitis noted. The patient has hypertrophic toenails X 8 with discoloration, onycholysis, and subungal debris. MUSCULOSKELETAL EXAM:   No acute joint pain. No acute edema, or redness. No acute musculoskeletal problem. Patient has deformity including bunion, right worse than left.

## 2023-10-06 ENCOUNTER — APPOINTMENT (OUTPATIENT)
Dept: LAB | Age: 88
End: 2023-10-06
Payer: MEDICARE

## 2023-10-06 ENCOUNTER — RA CDI HCC (OUTPATIENT)
Dept: OTHER | Facility: HOSPITAL | Age: 88
End: 2023-10-06

## 2023-10-06 DIAGNOSIS — R73.01 IMPAIRED FASTING GLUCOSE: ICD-10-CM

## 2023-10-06 DIAGNOSIS — E78.2 MIXED HYPERLIPIDEMIA: ICD-10-CM

## 2023-10-06 LAB
ALBUMIN SERPL BCP-MCNC: 4.4 G/DL (ref 3.5–5)
ALP SERPL-CCNC: 58 U/L (ref 34–104)
ALT SERPL W P-5'-P-CCNC: 14 U/L (ref 7–52)
ANION GAP SERPL CALCULATED.3IONS-SCNC: 11 MMOL/L
AST SERPL W P-5'-P-CCNC: 22 U/L (ref 13–39)
BASOPHILS # BLD AUTO: 0.04 THOUSANDS/ÂΜL (ref 0–0.1)
BASOPHILS NFR BLD AUTO: 1 % (ref 0–1)
BILIRUB SERPL-MCNC: 0.58 MG/DL (ref 0.2–1)
BUN SERPL-MCNC: 17 MG/DL (ref 5–25)
CALCIUM SERPL-MCNC: 9.7 MG/DL (ref 8.4–10.2)
CHLORIDE SERPL-SCNC: 100 MMOL/L (ref 96–108)
CHOLEST SERPL-MCNC: 223 MG/DL
CO2 SERPL-SCNC: 29 MMOL/L (ref 21–32)
CREAT SERPL-MCNC: 0.87 MG/DL (ref 0.6–1.3)
EOSINOPHIL # BLD AUTO: 0.13 THOUSAND/ÂΜL (ref 0–0.61)
EOSINOPHIL NFR BLD AUTO: 3 % (ref 0–6)
ERYTHROCYTE [DISTWIDTH] IN BLOOD BY AUTOMATED COUNT: 14 % (ref 11.6–15.1)
EST. AVERAGE GLUCOSE BLD GHB EST-MCNC: 137 MG/DL
GFR SERPL CREATININE-BSD FRML MDRD: 59 ML/MIN/1.73SQ M
GLUCOSE P FAST SERPL-MCNC: 96 MG/DL (ref 65–99)
HBA1C MFR BLD: 6.4 %
HCT VFR BLD AUTO: 39.9 % (ref 34.8–46.1)
HDLC SERPL-MCNC: 125 MG/DL
HGB BLD-MCNC: 12.7 G/DL (ref 11.5–15.4)
IMM GRANULOCYTES # BLD AUTO: 0.01 THOUSAND/UL (ref 0–0.2)
IMM GRANULOCYTES NFR BLD AUTO: 0 % (ref 0–2)
LDLC SERPL CALC-MCNC: 81 MG/DL (ref 0–100)
LYMPHOCYTES # BLD AUTO: 1.96 THOUSANDS/ÂΜL (ref 0.6–4.47)
LYMPHOCYTES NFR BLD AUTO: 41 % (ref 14–44)
MCH RBC QN AUTO: 31.4 PG (ref 26.8–34.3)
MCHC RBC AUTO-ENTMCNC: 31.8 G/DL (ref 31.4–37.4)
MCV RBC AUTO: 99 FL (ref 82–98)
MONOCYTES # BLD AUTO: 0.55 THOUSAND/ÂΜL (ref 0.17–1.22)
MONOCYTES NFR BLD AUTO: 11 % (ref 4–12)
NEUTROPHILS # BLD AUTO: 2.13 THOUSANDS/ÂΜL (ref 1.85–7.62)
NEUTS SEG NFR BLD AUTO: 44 % (ref 43–75)
NONHDLC SERPL-MCNC: 98 MG/DL
NRBC BLD AUTO-RTO: 0 /100 WBCS
PLATELET # BLD AUTO: 238 THOUSANDS/UL (ref 149–390)
PMV BLD AUTO: 10.9 FL (ref 8.9–12.7)
POTASSIUM SERPL-SCNC: 4.3 MMOL/L (ref 3.5–5.3)
PROT SERPL-MCNC: 7.1 G/DL (ref 6.4–8.4)
RBC # BLD AUTO: 4.05 MILLION/UL (ref 3.81–5.12)
SODIUM SERPL-SCNC: 140 MMOL/L (ref 135–147)
TRIGL SERPL-MCNC: 87 MG/DL
WBC # BLD AUTO: 4.82 THOUSAND/UL (ref 4.31–10.16)

## 2023-10-06 PROCEDURE — 80053 COMPREHEN METABOLIC PANEL: CPT

## 2023-10-06 PROCEDURE — 80061 LIPID PANEL: CPT

## 2023-10-06 PROCEDURE — 85025 COMPLETE CBC W/AUTO DIFF WBC: CPT

## 2023-10-06 PROCEDURE — 83036 HEMOGLOBIN GLYCOSYLATED A1C: CPT

## 2023-10-06 PROCEDURE — 36415 COLL VENOUS BLD VENIPUNCTURE: CPT

## 2023-10-13 ENCOUNTER — OFFICE VISIT (OUTPATIENT)
Dept: INTERNAL MEDICINE CLINIC | Facility: CLINIC | Age: 88
End: 2023-10-13
Payer: MEDICARE

## 2023-10-13 VITALS
TEMPERATURE: 97.2 F | OXYGEN SATURATION: 98 % | HEART RATE: 70 BPM | SYSTOLIC BLOOD PRESSURE: 158 MMHG | WEIGHT: 172.6 LBS | DIASTOLIC BLOOD PRESSURE: 92 MMHG | HEIGHT: 62 IN | BODY MASS INDEX: 31.76 KG/M2 | RESPIRATION RATE: 16 BRPM

## 2023-10-13 DIAGNOSIS — M81.0 AGE-RELATED OSTEOPOROSIS WITHOUT CURRENT PATHOLOGICAL FRACTURE: ICD-10-CM

## 2023-10-13 DIAGNOSIS — Z00.00 MEDICARE ANNUAL WELLNESS VISIT, SUBSEQUENT: Primary | ICD-10-CM

## 2023-10-13 DIAGNOSIS — R73.01 IMPAIRED FASTING GLUCOSE: ICD-10-CM

## 2023-10-13 DIAGNOSIS — Z59.82 INABILITY TO ACQUIRE TRANSPORTATION: ICD-10-CM

## 2023-10-13 DIAGNOSIS — K21.9 GERD WITHOUT ESOPHAGITIS: ICD-10-CM

## 2023-10-13 DIAGNOSIS — E78.2 MIXED HYPERLIPIDEMIA: ICD-10-CM

## 2023-10-13 PROCEDURE — 99214 OFFICE O/P EST MOD 30 MIN: CPT | Performed by: INTERNAL MEDICINE

## 2023-10-13 PROCEDURE — G0439 PPPS, SUBSEQ VISIT: HCPCS | Performed by: INTERNAL MEDICINE

## 2023-10-13 PROCEDURE — 96372 THER/PROPH/DIAG INJ SC/IM: CPT | Performed by: INTERNAL MEDICINE

## 2023-10-13 SDOH — ECONOMIC STABILITY - TRANSPORTATION SECURITY: TRANSPORTATION INSECURITY: Z59.82

## 2023-10-13 NOTE — PROGRESS NOTES
Assessment and Plan:     Problem List Items Addressed This Visit        Digestive    GERD without esophagitis       Endocrine    Impaired fasting glucose    Relevant Orders    Hemoglobin A1C       Musculoskeletal and Integument    Osteoporosis    Relevant Medications    denosumab (PROLIA) subcutaneous injection 60 mg (Completed) (Start on 10/13/2023 11:15 AM)       Other    Mixed hyperlipidemia    Relevant Orders    Comprehensive metabolic panel    CBC and differential    Lipid panel   Other Visit Diagnoses     Medicare annual wellness visit, subsequent    -  Primary    Inability to acquire transportation        Relevant Orders    Ambulatory referral to social work care management program      Her chronic problems are stable. Since she was not sure if the leg issues improved off of the Crestor, she is going to try taking it just Monday, Wednesday, Friday. If numbers do not improve much, at her age, she is going to consider just stopping it. She will watch her diet for her sugar. Continue her omeprazole 20 mg daily for the reflux. Ordered labs for next visit. Depression Screening and Follow-up Plan: Patient was screened for depression during today's encounter. They screened negative with a PHQ-2 score of 0. Preventive health issues were discussed with patient, and age appropriate screening tests were ordered as noted in patient's After Visit Summary. Personalized health advice and appropriate referrals for health education or preventive services given if needed, as noted in patient's After Visit Summary. History of Present Illness:     Patient presents for a Medicare Wellness Visit    Patient comes in today for routine follow-up and Medicare wellness. She is having more more trouble with transportation and getting here for her appointments. She lives in [facility]. Probably will need to transfer to a primary care doctor down there. Her cholesterol is not too bad.   She stopped the Crestor because of leg cramps. But she is not sure they improved much. She states her legs improve throughout the day. The more movement, the better. So we both think it is probably arthritis in her back doing it. Her sugar looks okay. Her reflux is under control with her omeprazole. Denies any new complaints today. No further additions to her history. Patient Care Team:  Star Burger MD as PCP - General     Review of Systems:     Review of Systems   Respiratory:  Negative for shortness of breath. Cardiovascular:  Negative for chest pain. Gastrointestinal:  Negative for abdominal pain. Problem List:     Patient Active Problem List   Diagnosis   • Aortic stenosis, mild   • DJD (degenerative joint disease), multiple sites   • GERD without esophagitis   • Glaucoma   • Mixed hyperlipidemia   • Impaired fasting glucose   • Osteoporosis   • Psoriasis   • Precordial pain      Past Medical and Surgical History:     Past Medical History:   Diagnosis Date   • Hypertension    • Osteoporosis      History reviewed. No pertinent surgical history. Family History:     Family History   Problem Relation Age of Onset   • Hypertension Father       Social History:     Social History     Socioeconomic History   • Marital status:      Spouse name: None   • Number of children: None   • Years of education: None   • Highest education level: None   Occupational History   • None   Tobacco Use   • Smoking status: Former     Packs/day: 0.50     Types: Cigarettes   • Smokeless tobacco: Former   • Tobacco comments:     Patient reports she smoked for 20 years   Vaping Use   • Vaping Use: Never used   Substance and Sexual Activity   • Alcohol use:  Yes   • Drug use: No   • Sexual activity: Not Currently   Other Topics Concern   • None   Social History Narrative   • None     Social Determinants of Health     Financial Resource Strain: Low Risk  (10/8/2023)    Overall Financial Resource Strain (CARDIA)    • Difficulty of Paying Living Expenses: Not very hard   Food Insecurity: Not on file   Transportation Needs: Unmet Transportation Needs (10/8/2023)    PRAPARE - Transportation    • Lack of Transportation (Medical): No    • Lack of Transportation (Non-Medical): Yes   Physical Activity: Not on file   Stress: Not on file   Social Connections: Not on file   Intimate Partner Violence: Not on file   Housing Stability: Not on file      Medications and Allergies:     Current Outpatient Medications   Medication Sig Dispense Refill   • brimonidine (Alphagan P) 0.1 %      • celecoxib (CeleBREX) 100 mg capsule Take 1 capsule (100 mg total) by mouth daily 90 capsule 3   • HALOG 0.1 % CREA      • omeprazole (PriLOSEC) 20 mg delayed release capsule Take 1 capsule (20 mg total) by mouth daily 90 capsule 3   • TRAVATAN Z 0.004 % ophthalmic solution      • rosuvastatin (CRESTOR) 10 MG tablet Take 1 tablet (10 mg total) by mouth daily (Patient not taking: Reported on 10/13/2023) 90 tablet 3     No current facility-administered medications for this visit.      No Known Allergies   Immunizations:     Immunization History   Administered Date(s) Administered   • COVID-19 PFIZER VACCINE 0.3 ML IM 01/25/2021, 02/23/2021, 10/01/2021   • COVID-19 Pfizer Vac BIVALENT Donn-sucrose 12 Yr+ IM 09/22/2022   • COVID-19 Pfizer mRNA vacc PF donn-sucrose 12 yr and older (Comirnaty) 10/10/2023   • COVID-19 Pfizer vac (Donn-sucrose, gray cap) 12 yr+ IM 04/22/2022   • INFLUENZA 10/14/2015, 10/14/2015, 10/24/2017, 10/24/2017, 11/05/2019, 10/14/2021, 10/06/2022, 10/10/2023   • Influenza, high dose seasonal 0.7 mL 10/16/2018, 11/05/2019   • Pneumococcal Conjugate 13-Valent 08/26/2015   • Pneumococcal Conjugate Vaccine 20-valent (Pcv20), Polysace 10/06/2022   • Pneumococcal Polysaccharide PPV23 11/05/2019      Health Maintenance:         Topic Date Due   • Colorectal Cancer Screening  02/16/2019   • Breast Cancer Screening: Mammogram  11/15/2023     There are no preventive care reminders to display for this patient. Medicare Screening Tests and Risk Assessments:     Belinda Limon is here for her Subsequent Wellness visit. Health Risk Assessment:   Patient rates overall health as good. Patient feels that their physical health rating is slightly worse. Patient is satisfied with their life. Eyesight was rated as slightly worse. Hearing was rated as slightly worse. Patient feels that their emotional and mental health rating is same. Patients states they are never, rarely angry. Patient states they are never, rarely unusually tired/fatigued. Pain experienced in the last 7 days has been some. Patient's pain rating has been 8/10. Patient states that she has experienced no weight loss or gain in last 6 months. Depression Screening:   PHQ-2 Score: 0      Fall Risk Screening: In the past year, patient has experienced: history of falling in past year    Number of falls: 1  Injured during fall?: No    Feels unsteady when standing or walking?: Yes    Worried about falling?: Yes      Urinary Incontinence Screening:   Patient has not leaked urine accidently in the last six months. Home Safety:  Patient does not have trouble with stairs inside or outside of their home. Patient has working smoke alarms and has working carbon monoxide detector. Home safety hazards include: household clutter. Nutrition:   Current diet is Regular. Medications:   Patient is currently taking over-the-counter supplements. OTC medications include: see medication list. Patient is able to manage medications. Activities of Daily Living (ADLs)/Instrumental Activities of Daily Living (IADLs):   Walk and transfer into and out of bed and chair?: Yes  Dress and groom yourself?: Yes    Bathe or shower yourself?: Yes    Feed yourself?  Yes  Do your laundry/housekeeping?: Yes  Manage your money, pay your bills and track your expenses?: Yes  Make your own meals?: Yes    Do your own shopping?: Yes    Previous Hospitalizations:   Any hospitalizations or ED visits within the last 12 months?: No      Advance Care Planning:   Living will: Yes    Durable POA for healthcare: No    Advanced directive: Yes    Advanced directive counseling given: Yes      Cognitive Screening:   Provider or family/friend/caregiver concerned regarding cognition?: No    PREVENTIVE SCREENINGS      Cardiovascular Screening:    General: Screening Not Indicated and History Lipid Disorder      Diabetes Screening:     General: Screening Current      Colorectal Cancer Screening:     General: Screening Not Indicated      Breast Cancer Screening:     General: Screening Current      Cervical Cancer Screening:    General: Screening Not Indicated      Osteoporosis Screening:    General: Screening Not Indicated and History Osteoporosis      Abdominal Aortic Aneurysm (AAA) Screening:        General: Screening Not Indicated      Lung Cancer Screening:     General: Screening Not Indicated      Hepatitis C Screening:    General: Screening Not Indicated    Screening, Brief Intervention, and Referral to Treatment (SBIRT)    Screening  Typical number of drinks in a day: 2  Typical number of drinks in a week: 8  Interpretation: Low risk drinking behavior. AUDIT-C Screenin) How often did you have a drink containing alcohol in the past year? 4 or more times a week  2) How many drinks did you have on a typical day when you were drinking in the past year? 1 to 2  3) How often did you have 6 or more drinks on one occasion in the past year? never    AUDIT-C Score: 4  Interpretation: Score 3-12 (female): POSITIVE screen for alcohol misuse    AUDIT Screenin) How often during the last year have you found that you were not able to stop drinking once you had started?  0 - never  5) How often during the last year have you failed to do what was normally expected from you because of drinking? 0 - never  6) How often during the last year have you needed a first drink in the morning to get yourself going after a heavy drinking session? 0 - never  7) How often during the last year have you had a feeling of guilt or remorse after drinking? 0 - never  8) How often during the last year have you been unable to remember what happened the night before because you had been drinking? 0 - never  9) Have you or someone else been injured as a result of your drinking? 0 - no  10) Has a relative or friend or a doctor or another health worker been concerned about your drinking or suggested you cut down? 0 - no    AUDIT Score: 4  Interpretation: Low risk alcohol consumption    Single Item Drug Screening:  How often have you used an illegal drug (including marijuana) or a prescription medication for non-medical reasons in the past year? never    Single Item Drug Screen Score: 0  Interpretation: Negative screen for possible drug use disorder    Brief Intervention  Alcohol & drug use screenings were reviewed. No concerns regarding substance use disorder identified. No results found. Physical Exam:     /92 (BP Location: Left arm, Patient Position: Sitting, Cuff Size: Adult)   Pulse 70   Temp (!) 97.2 °F (36.2 °C) (Tympanic)   Resp 16   Ht 5' 2" (1.575 m)   Wt 78.3 kg (172 lb 9.6 oz)   SpO2 98%   BMI 31.57 kg/m²     Physical Exam  Vitals and nursing note reviewed. Constitutional:       Appearance: She is well-developed. Cardiovascular:      Rate and Rhythm: Normal rate and regular rhythm. Pulmonary:      Effort: Pulmonary effort is normal.      Breath sounds: Normal breath sounds. Abdominal:      General: Abdomen is flat. Tenderness: There is no abdominal tenderness. Musculoskeletal:      Right lower leg: No edema. Left lower leg: No edema. Neurological:      Mental Status: She is alert and oriented to person, place, and time. Psychiatric:         Behavior: Behavior normal.         Thought Content:  Thought content normal.         Judgment: Judgment normal. Soiba Hawthorne MD

## 2023-10-13 NOTE — PATIENT INSTRUCTIONS
Medicare Preventive Visit Patient Instructions  Thank you for completing your Welcome to Medicare Visit or Medicare Annual Wellness Visit today. Your next wellness visit will be due in one year (10/13/2024). The screening/preventive services that you may require over the next 5-10 years are detailed below. Some tests may not apply to you based off risk factors and/or age. Screening tests ordered at today's visit but not completed yet may show as past due. Also, please note that scanned in results may not display below. Preventive Screenings:  Service Recommendations Previous Testing/Comments   Colorectal Cancer Screening  * Colonoscopy    * Fecal Occult Blood Test (FOBT)/Fecal Immunochemical Test (FIT)  * Fecal DNA/Cologuard Test  * Flexible Sigmoidoscopy Age: 43-73 years old   Colonoscopy: every 10 years (may be performed more frequently if at higher risk)  OR  FOBT/FIT: every 1 year  OR  Cologuard: every 3 years  OR  Sigmoidoscopy: every 5 years  Screening may be recommended earlier than age 39 if at higher risk for colorectal cancer. Also, an individualized decision between you and your healthcare provider will decide whether screening between the ages of 77-80 would be appropriate. Colonoscopy: 02/16/2016  FOBT/FIT: Not on file  Cologuard: Not on file  Sigmoidoscopy: Not on file          Breast Cancer Screening Age: 36 years old  Frequency: every 1-2 years  Not required if history of left and right mastectomy Mammogram: 11/15/2022        Cervical Cancer Screening Between the ages of 21-29, pap smear recommended once every 3 years. Between the ages of 32-69, can perform pap smear with HPV co-testing every 5 years.    Recommendations may differ for women with a history of total hysterectomy, cervical cancer, or abnormal pap smears in past. Pap Smear: Not on file        Hepatitis C Screening Once for adults born between [address] [place] [address]  More frequently in patients at high risk for Hepatitis C Hep C Antibody: Not on file        Diabetes Screening 1-2 times per year if you're at risk for diabetes or have pre-diabetes Fasting glucose: 96 mg/dL (10/6/2023)  A1C: 6.4 % (10/6/2023)      Cholesterol Screening Once every 5 years if you don't have a lipid disorder. May order more often based on risk factors. Lipid panel: 10/06/2023          Other Preventive Screenings Covered by Medicare:  Abdominal Aortic Aneurysm (AAA) Screening: covered once if your at risk. You're considered to be at risk if you have a family history of AAA. Lung Cancer Screening: covers low dose CT scan once per year if you meet all of the following conditions: (1) Age 48-67; (2) No signs or symptoms of lung cancer; (3) Current smoker or have quit smoking within the last 15 years; (4) You have a tobacco smoking history of at least 20 pack years (packs per day multiplied by number of years you smoked); (5) You get a written order from a healthcare provider. Glaucoma Screening: covered annually if you're considered high risk: (1) You have diabetes OR (2) Family history of glaucoma OR (3)  aged 48 and older OR (3)  American aged 72 and older  Osteoporosis Screening: covered every 2 years if you meet one of the following conditions: (1) You're estrogen deficient and at risk for osteoporosis based off medical history and other findings; (2) Have a vertebral abnormality; (3) On glucocorticoid therapy for more than 3 months; (4) Have primary hyperparathyroidism; (5) On osteoporosis medications and need to assess response to drug therapy. Last bone density test (DXA Scan): 07/02/2019. HIV Screening: covered annually if you're between the age of 14-79. Also covered annually if you are younger than 13 and older than 72 with risk factors for HIV infection. For pregnant patients, it is covered up to 3 times per pregnancy.     Immunizations:  Immunization Recommendations   Influenza Vaccine Annual influenza vaccination during flu season is recommended for all persons aged >= 6 months who do not have contraindications   Pneumococcal Vaccine   * Pneumococcal conjugate vaccine = PCV13 (Prevnar 13), PCV15 (Vaxneuvance), PCV20 (Prevnar 20)  * Pneumococcal polysaccharide vaccine = PPSV23 (Pneumovax) Adults 96-51 yo with certain risk factors or if 69+ yo  If never received any pneumonia vaccine: recommend Prevnar 20 (PCV20)  Give PCV20 if previously received 1 dose of PCV13 or PPSV23   Hepatitis B Vaccine 3 dose series if at intermediate or high risk (ex: diabetes, end stage renal disease, liver disease)   Respiratory syncytial virus (RSV) Vaccine - COVERED BY MEDICARE PART D  * RSVPreF3 (Arexvy) CDC recommends that adults 61years of age and older may receive a single dose of RSV vaccine using shared clinical decision-making (SCDM)   Tetanus (Td) Vaccine - COST NOT COVERED BY MEDICARE PART B Following completion of primary series, a booster dose should be given every 10 years to maintain immunity against tetanus. Td may also be given as tetanus wound prophylaxis. Tdap Vaccine - COST NOT COVERED BY MEDICARE PART B Recommended at least once for all adults. For pregnant patients, recommended with each pregnancy. Shingles Vaccine (Shingrix) - COST NOT COVERED BY MEDICARE PART B  2 shot series recommended in those 19 years and older who have or will have weakened immune systems or those 50 years and older     Health Maintenance Due:      Topic Date Due   • Colorectal Cancer Screening  02/16/2019   • Breast Cancer Screening: Mammogram  11/15/2023     Immunizations Due:  There are no preventive care reminders to display for this patient. Advance Directives   What are advance directives? Advance directives are legal documents that state your wishes and plans for medical care. These plans are made ahead of time in case you lose your ability to make decisions for yourself.  Advance directives can apply to any medical decision, such as the treatments you want, and if you want to donate organs. What are the types of advance directives? There are many types of advance directives, and each state has rules about how to use them. You may choose a combination of any of the following:  Living will: This is a written record of the treatment you want. You can also choose which treatments you do not want, which to limit, and which to stop at a certain time. This includes surgery, medicine, IV fluid, and tube feedings. Durable power of  for healthcare [facility] [place]): This is a written record that states who you want to make healthcare choices for you when you are unable to make them for yourself. This person, called a proxy, is usually a family member or a friend. You may choose more than 1 proxy. Do not resuscitate (DNR) order:  A DNR order is used in case your heart stops beating or you stop breathing. It is a request not to have certain forms of treatment, such as CPR. A DNR order may be included in other types of advance directives. Medical directive: This covers the care that you want if you are in a coma, near death, or unable to make decisions for yourself. You can list the treatments you want for each condition. Treatment may include pain medicine, surgery, blood transfusions, dialysis, IV or tube feedings, and a ventilator (breathing machine). Values history: This document has questions about your views, beliefs, and how you feel and think about life. This information can help others choose the care that you would choose. Why are advance directives important? An advance directive helps you control your care. Although spoken wishes may be used, it is better to have your wishes written down. Spoken wishes can be misunderstood, or not followed. Treatments may be given even if you do not want them. An advance directive may make it easier for your family to make difficult choices about your care.    Weight Management   Why it is important to manage your weight: Being overweight increases your risk of health conditions such as heart disease, high blood pressure, type 2 diabetes, and certain types of cancer. It can also increase your risk for osteoarthritis, sleep apnea, and other respiratory problems. Aim for a slow, steady weight loss. Even a small amount of weight loss can lower your risk of health problems. How to lose weight safely:  A safe and healthy way to lose weight is to eat fewer calories and get regular exercise. You can lose up about 1 pound a week by decreasing the number of calories you eat by 500 calories each day. Healthy meal plan for weight management:  A healthy meal plan includes a variety of foods, contains fewer calories, and helps you stay healthy. A healthy meal plan includes the following:  Eat whole-grain foods more often. A healthy meal plan should contain fiber. Fiber is the part of grains, fruits, and vegetables that is not broken down by your body. Whole-grain foods are healthy and provide extra fiber in your diet. Some examples of whole-grain foods are whole-wheat breads and pastas, oatmeal, brown rice, and bulgur. Eat a variety of vegetables every day. Include dark, leafy greens such as spinach, kale, rubi greens, and mustard greens. Eat yellow and orange vegetables such as carrots, sweet potatoes, and winter squash. Eat a variety of fruits every day. Choose fresh or canned fruit (canned in its own juice or light syrup) instead of juice. Fruit juice has very little or no fiber. Eat low-fat dairy foods. Drink fat-free (skim) milk or 1% milk. Eat fat-free yogurt and low-fat cottage cheese. Try low-fat cheeses such as mozzarella and other reduced-fat cheeses. Choose meat and other protein foods that are low in fat. Choose beans or other legumes such as split peas or lentils. Choose fish, skinless poultry (chicken or turkey), or lean cuts of red meat (beef or pork). Before you cook meat or poultry, cut off any visible fat.    Use less fat and oil. Try baking foods instead of frying them. Add less fat, such as margarine, sour cream, regular salad dressing and mayonnaise to foods. Eat fewer high-fat foods. Some examples of high-fat foods include french fries, doughnuts, ice cream, and cakes. Eat fewer sweets. Limit foods and drinks that are high in sugar. This includes candy, cookies, regular soda, and sweetened drinks. Exercise:  Exercise at least 30 minutes per day on most days of the week. Some examples of exercise include walking, biking, dancing, and swimming. You can also fit in more physical activity by taking the stairs instead of the elevator or parking farther away from stores. Ask your healthcare provider about the best exercise plan for you. © Copyright [facility] 2018 Information is for End User's use only and may not be sold, redistributed or otherwise used for commercial purposes.  All illustrations and images included in CareNotes® are the copyrighted property of [employer].D.A[employer] Inc. or [address] [place] [address]

## 2023-10-17 ENCOUNTER — PATIENT OUTREACH (OUTPATIENT)
Dept: INTERNAL MEDICINE CLINIC | Facility: CLINIC | Age: 88
End: 2023-10-17

## 2023-10-17 NOTE — PROGRESS NOTES
OPCM THAI received [facility] referral for transportation issues. OPCM THAI reviewed patient's chart and reached out to both her home and mobile numbers.   No voicemail was set up, unable to leave a message

## 2023-10-23 ENCOUNTER — PATIENT OUTREACH (OUTPATIENT)
Dept: INTERNAL MEDICINE CLINIC | Facility: CLINIC | Age: 88
End: 2023-10-23

## 2023-10-23 NOTE — LETTER
[id number]   [facility] [address]  [facility] [place] [address]  [phone]    Re: Devon Ferguson to Reach   10/23/2023       Dear Tyler Hanna am a 1959 [facility] Work  and wanted to be certain you had information to contact me should you desire assistance with or have questions about non-medical aspects of your care such as [but not limited to] medical insurance, housing, transportation, material needs, or emergency needs. If I do not have an answer I will assist you in finding the appropriate agency or individual who can help. Please feel free to contact me at [phone]. Thank You.     Sincerely,         ANIYAH Cuevas

## 2023-10-23 NOTE — PROGRESS NOTES
OPCM SW attempted second outreach to patient regarding transportation. OPCM SW unable to leave voicemail as it is not set up.   OPCM SW sent UTR letter to [address] [place] [address]

## 2023-12-08 ENCOUNTER — TELEPHONE (OUTPATIENT)
Dept: INTERNAL MEDICINE CLINIC | Facility: CLINIC | Age: 88
End: 2023-12-08

## 2023-12-08 NOTE — TELEPHONE ENCOUNTER
Pt says she called yesterday ,  no messages in chart,   pt has sob, and chest justin,  I offered 1 oclock but she's in [place] and can't make it .      I suggested an urgent care around her area

## 2023-12-09 ENCOUNTER — OFFICE VISIT (OUTPATIENT)
Dept: URGENT CARE | Age: 88
End: 2023-12-09
Payer: MEDICARE

## 2023-12-09 VITALS
SYSTOLIC BLOOD PRESSURE: 140 MMHG | RESPIRATION RATE: 18 BRPM | HEART RATE: 76 BPM | DIASTOLIC BLOOD PRESSURE: 92 MMHG | OXYGEN SATURATION: 96 % | TEMPERATURE: 97.7 F

## 2023-12-09 DIAGNOSIS — R06.2 WHEEZING: Primary | ICD-10-CM

## 2023-12-09 PROCEDURE — 99213 OFFICE O/P EST LOW 20 MIN: CPT | Performed by: NURSE PRACTITIONER

## 2023-12-09 PROCEDURE — G0463 HOSPITAL OUTPT CLINIC VISIT: HCPCS | Performed by: NURSE PRACTITIONER

## 2023-12-09 RX ORDER — PREDNISONE 20 MG/1
20 TABLET ORAL 2 TIMES DAILY WITH MEALS
Qty: 10 TABLET | Refills: 0 | Status: SHIPPED | OUTPATIENT
Start: 2023-12-09 | End: 2023-12-14

## 2023-12-09 NOTE — PROGRESS NOTES
[facility]        NAME: Malena Nyhan is a 80 y.o. female  : 1935    MRN: [id number]  DATE: 2023  TIME: 3:32 PM    Assessment and Plan   Wheezing [R06.2]  1. Wheezing  predniSONE 20 mg tablet            Patient Instructions     Prednisone for wheezing  OTC medication for cough and congestion  Follow up with PCP in 3-5 days. Proceed to  ER if symptoms worsen. Chief Complaint     Chief Complaint   Patient presents with    Cold Like Symptoms    Cough     Symptoms started 1 week ago. Took OTC medication with no relief. History of Present Illness       HPI  Presents to clinic with complaint of chest congestion and cough. States when laying down at night, she hears a rumbling sound in her chest.  Duration of symptoms x 1 week    Review of Systems   Review of Systems   Constitutional:  Negative for fever. HENT:  Positive for congestion. Negative for postnasal drip and sore throat. Respiratory:  Positive for cough. Negative for chest tightness and shortness of breath. Cardiovascular:  Negative for chest pain. Neurological:  Negative for light-headedness.          Current Medications       Current Outpatient Medications:     brimonidine (Alphagan P) 0.1 %, , Disp: , Rfl:     celecoxib (CeleBREX) 100 mg capsule, Take 1 capsule (100 mg total) by mouth daily, Disp: 90 capsule, Rfl: 3    HALOG 0.1 % CREA, , Disp: , Rfl:     omeprazole (PriLOSEC) 20 mg delayed release capsule, Take 1 capsule (20 mg total) by mouth daily, Disp: 90 capsule, Rfl: 3    predniSONE 20 mg tablet, Take 1 tablet (20 mg total) by mouth 2 (two) times a day with meals for 5 days, Disp: 10 tablet, Rfl: 0    TRAVATAN Z 0.004 % ophthalmic solution, , Disp: , Rfl:     rosuvastatin (CRESTOR) 10 MG tablet, Take 1 tablet (10 mg total) by mouth daily (Patient not taking: Reported on 10/13/2023), Disp: 90 tablet, Rfl: 3    Current Allergies     Allergies as of 2023    (No Known Allergies)            The following portions of the patient's history were reviewed and updated as appropriate: allergies, current medications, past family history, past medical history, past social history, past surgical history and problem list.     Past Medical History:   Diagnosis Date    Hypertension     Osteoporosis        No past surgical history on file. Family History   Problem Relation Age of Onset    Hypertension Father          Medications have been verified. Objective   /92   Pulse 76   Temp 97.7 °F (36.5 °C)   Resp 18   SpO2 96%   No LMP recorded. Patient is postmenopausal.       Physical Exam     Physical Exam  Constitutional:       Appearance: She is not ill-appearing or diaphoretic. HENT:      Right Ear: Tympanic membrane normal.      Left Ear: Tympanic membrane normal.      Nose: No rhinorrhea. Mouth/Throat:      Pharynx: No posterior oropharyngeal erythema. Cardiovascular:      Rate and Rhythm: Regular rhythm. Heart sounds: Normal heart sounds.    Pulmonary:      Effort: Pulmonary effort is normal.      Comments: Chest congestion and wheezing in the lower lobes

## 2023-12-14 ENCOUNTER — OFFICE VISIT (OUTPATIENT)
Dept: PODIATRY | Facility: CLINIC | Age: 88
End: 2023-12-14
Payer: MEDICARE

## 2023-12-14 VITALS
HEIGHT: 62 IN | HEART RATE: 76 BPM | SYSTOLIC BLOOD PRESSURE: 122 MMHG | DIASTOLIC BLOOD PRESSURE: 82 MMHG | BODY MASS INDEX: 31.65 KG/M2 | WEIGHT: 172 LBS

## 2023-12-14 DIAGNOSIS — I73.9 PERIPHERAL VASCULAR DISEASE, UNSPECIFIED (HCC): Primary | ICD-10-CM

## 2023-12-14 DIAGNOSIS — B35.1 ONYCHOMYCOSIS: ICD-10-CM

## 2023-12-14 PROCEDURE — 11721 DEBRIDE NAIL 6 OR MORE: CPT | Performed by: PODIATRIST

## 2023-12-14 NOTE — PROGRESS NOTES
Pat Levels  1935  AT RISK FOOT CARE    1. Peripheral vascular disease, unspecified ([address])        2. Onychomycosis            Patient presents for at-risk foot care. Patient has no acute concerns today. Patient has significant lower extremity risk due to diminished pulses in the feet and trophic skin changes to the lower extremity including thick toenail, atrophic skin, and decreased hair growth. On exam patient has thickened, hypertrophic, discolored, brittle toenails with subungual debris and tenderness x10   Callus: none  Patient has diminished pedal pulses and decreased perfusion to the lower extremities  Patient has significant trophic changes to the skin including thick toenails, decreased pedal hair and atrophic skin. Today's treatment includes:  Debridement of toenails. Using nail nipper, fran, and curette, nails were sharply debrided, reduced in thickness and length. Devitalized nail tissue and fungal debris excised and removed. Patient tolerated well. Discussed proper shoe gear, daily inspections of feet, and general foot health with patient. Patient has Q8  findings and is recommended for at risk foot care every 9-10 weeks.

## 2024-01-26 DIAGNOSIS — K21.9 GERD WITHOUT ESOPHAGITIS: ICD-10-CM

## 2024-01-26 RX ORDER — OMEPRAZOLE 20 MG/1
20 CAPSULE, DELAYED RELEASE ORAL DAILY
Qty: 90 CAPSULE | Refills: 1 | Status: SHIPPED | OUTPATIENT
Start: 2024-01-26

## 2024-02-14 ENCOUNTER — FOLLOW UP (OUTPATIENT)
Dept: URBAN - METROPOLITAN AREA CLINIC 6 | Facility: CLINIC | Age: 89
End: 2024-02-14

## 2024-02-14 DIAGNOSIS — H40.1132: ICD-10-CM

## 2024-02-14 DIAGNOSIS — Z96.1: ICD-10-CM

## 2024-02-14 PROCEDURE — 92083 EXTENDED VISUAL FIELD XM: CPT

## 2024-02-14 PROCEDURE — 92012 INTRM OPH EXAM EST PATIENT: CPT

## 2024-02-14 ASSESSMENT — VISUAL ACUITY
OS_SC: 20/50
OD_SC: CF 4FT

## 2024-02-14 ASSESSMENT — TONOMETRY
OS_IOP_MMHG: 21
OD_IOP_MMHG: 19

## 2024-03-05 NOTE — ASSESSMENT & PLAN NOTE
On prolia  As per chart started on 10/13/2023   Due for DEXA  Will submit prior authorization for Prolia

## 2024-03-05 NOTE — PROGRESS NOTES
"Name: Dori Forbes      : 1935      MRN: [id number]  Encounter Provider: Toya Colvin MD  Encounter Date: 3/6/2024   Enc[facility] department: [facility]    Assessment & Plan     1. Encounter for screening mammogram for breast cancer    2. Age-related osteoporosis without current pathological fracture  Assessment & Plan:  On prolia  As per chart started on 10/13/2023   Due for DEXA  Will submit prior authorization for Prolia    Orders:  -     CBC and differential; Future    3. Polypharmacy  Assessment & Plan:  On celebrex for years  Unclear indication  Chronic aches and pains are mild  Ok to stop it.     We reviewed her OTC supplement.  Can continue vitamin D magnesium and vitamin for her eye.  Okay to stop to rest      4. GERD without esophagitis  Assessment & Plan:  On omeprazole for years  Also reports hiatal hernia  Could not wean off it       5. Open-angle glaucoma, unspecified glaucoma stage, unspecified laterality, unspecified open-angle glaucoma type  Assessment & Plan:  Follow-up with glaucoma specialist      6. Hyperlipidemia, unspecified hyperlipidemia type  -     Lipid Panel With Direct LDL; Future  -     CBC and differential; Future    7. Prediabetes  Assessment & Plan:  Continue healthy diet and regular exercise  Monitor    Orders:  -     Hemoglobin A1C; Future  -     Basic metabolic panel; Future  -     CBC and differential; Future    8. Vitamin D deficiency  -     Vitamin D 25 hydroxy; Future           Subjective      HPI  New pt    Vision problem, glaucoma  Limited driving within 1mile radius  \"Sight for shopping\"  Independent on iadls and alds    On celebrex for several years        Review of Systems    Current Outpatient Medications on File Prior to Visit   Medication Sig    brimonidine (Alphagan P) 0.1 %     denosumab (PROLIA) 60 mg/mL Inject 60 mg under the skin every 6 (six) months    HALOG 0.1 % CREA     omeprazole (PriLOSEC) 20 mg delayed release capsule Take 1 " "capsule (20 mg total) by mouth daily    travoprost (TRAVATAN-Z) 0.004 % ophthalmic solution Administer 1 drop to both eyes daily at bedtime    [DISCONTINUED] celecoxib (CeleBREX) 100 mg capsule Take 1 capsule (100 mg total) by mouth daily    [DISCONTINUED] TRAVATAN Z 0.004 % ophthalmic solution  (Patient not taking: Reported on 3/6/2024)       Objective     /80 (BP Location: Left arm, Patient Position: Sitting, Cuff Size: Large)   Pulse 66   Temp 97.9 °F (36.6 °C) (Probe)   Ht 5' 2.21\" (1.58 m)   Wt 74.4 kg (164 lb)   SpO2 97%   BMI 29.80 kg/m²     Physical Exam  Toya Colvin MD    "

## 2024-03-06 ENCOUNTER — OFFICE VISIT (OUTPATIENT)
Dept: INTERNAL MEDICINE CLINIC | Facility: CLINIC | Age: 89
End: 2024-03-06
Payer: MEDICARE

## 2024-03-06 VITALS
DIASTOLIC BLOOD PRESSURE: 80 MMHG | TEMPERATURE: 97.9 F | HEIGHT: 62 IN | SYSTOLIC BLOOD PRESSURE: 140 MMHG | HEART RATE: 66 BPM | BODY MASS INDEX: 30.18 KG/M2 | OXYGEN SATURATION: 97 % | WEIGHT: 164 LBS

## 2024-03-06 DIAGNOSIS — K21.9 GERD WITHOUT ESOPHAGITIS: ICD-10-CM

## 2024-03-06 DIAGNOSIS — Z79.899 POLYPHARMACY: ICD-10-CM

## 2024-03-06 DIAGNOSIS — E78.5 HYPERLIPIDEMIA, UNSPECIFIED HYPERLIPIDEMIA TYPE: ICD-10-CM

## 2024-03-06 DIAGNOSIS — Z12.31 ENCOUNTER FOR SCREENING MAMMOGRAM FOR BREAST CANCER: Primary | ICD-10-CM

## 2024-03-06 DIAGNOSIS — E55.9 VITAMIN D DEFICIENCY: ICD-10-CM

## 2024-03-06 DIAGNOSIS — H40.10X0 OPEN-ANGLE GLAUCOMA, UNSPECIFIED GLAUCOMA STAGE, UNSPECIFIED LATERALITY, UNSPECIFIED OPEN-ANGLE GLAUCOMA TYPE: ICD-10-CM

## 2024-03-06 DIAGNOSIS — R73.03 PREDIABETES: ICD-10-CM

## 2024-03-06 DIAGNOSIS — M81.0 AGE-RELATED OSTEOPOROSIS WITHOUT CURRENT PATHOLOGICAL FRACTURE: Primary | ICD-10-CM

## 2024-03-06 DIAGNOSIS — M81.0 AGE-RELATED OSTEOPOROSIS WITHOUT CURRENT PATHOLOGICAL FRACTURE: ICD-10-CM

## 2024-03-06 PROCEDURE — G2211 COMPLEX E/M VISIT ADD ON: HCPCS | Performed by: GENERAL ACUTE CARE HOSPITAL

## 2024-03-06 PROCEDURE — 99204 OFFICE O/P NEW MOD 45 MIN: CPT | Performed by: GENERAL ACUTE CARE HOSPITAL

## 2024-03-06 RX ORDER — TRAVOPROST OPHTHALMIC SOLUTION 0.04 MG/ML
1 SOLUTION OPHTHALMIC
COMMUNITY

## 2024-03-06 NOTE — ASSESSMENT & PLAN NOTE
On celebrex for years  Unclear indication  Chronic aches and pains are mild  Ok to stop it.     We reviewed her OTC supplement.  Can continue vitamin D magnesium and vitamin for her eye.  Okay to stop to rest

## 2024-03-06 NOTE — ASSESSMENT & PLAN NOTE
Was on atorvastatin  Stopped due to leg cramp  Cramp did not improve right after but after she started Mg it resolved.

## 2024-03-07 ENCOUNTER — TELEPHONE (OUTPATIENT)
Age: 89
End: 2024-03-07

## 2024-03-26 ENCOUNTER — OFFICE VISIT (OUTPATIENT)
Dept: PODIATRY | Facility: CLINIC | Age: 89
End: 2024-03-26
Payer: MEDICARE

## 2024-03-26 VITALS
HEIGHT: 62 IN | DIASTOLIC BLOOD PRESSURE: 77 MMHG | SYSTOLIC BLOOD PRESSURE: 135 MMHG | HEART RATE: 69 BPM | WEIGHT: 162 LBS | BODY MASS INDEX: 29.81 KG/M2

## 2024-03-26 DIAGNOSIS — B35.1 ONYCHOMYCOSIS: ICD-10-CM

## 2024-03-26 DIAGNOSIS — I73.9 PERIPHERAL VASCULAR DISEASE, UNSPECIFIED (HCC): Primary | ICD-10-CM

## 2024-03-26 PROCEDURE — 11721 DEBRIDE NAIL 6 OR MORE: CPT | Performed by: PODIATRIST

## 2024-03-26 NOTE — PROGRESS NOTES
PATIENT:  Dori Forbes  1935    ASSESSMENT/PLAN:  1. Peripheral vascular disease, unspecified (HCC)        2. Onychomycosis  Debridement           Patient was counseled on the condition and diagnosis. Educated proper daily foot care and exam.  Instructed proper skin care / protection and footwear.  The patient will return in 9 weeks.      PROCEDURE:  All mycotic toenails were reduced and debrided in length, width, and girth using a nail nipper and dremel.  Patient tolerated procedure(s) well without complications.      HPI:  Dori Forbes is a 88 y.o.year old female seen for at risk foot care.  She has class findings.  She has chronic nail fungus with tenderness.  The patient denied any acute pedal disorder or injury.       PAST MEDICAL HISTORY:  Past Medical History:   Diagnosis Date    Hypertension     Osteoporosis        PAST SURGICAL HISTORY:  History reviewed. No pertinent surgical history.     ALLERGIES:  Patient has no known allergies.    MEDICATIONS:  Current Outpatient Medications   Medication Sig Dispense Refill    brimonidine (Alphagan P) 0.1 %       denosumab (PROLIA) 60 mg/mL Inject 60 mg under the skin every 6 (six) months      HALOG 0.1 % CREA       omeprazole (PriLOSEC) 20 mg delayed release capsule Take 1 capsule (20 mg total) by mouth daily 90 capsule 1    travoprost (TRAVATAN-Z) 0.004 % ophthalmic solution Administer 1 drop to both eyes daily at bedtime       No current facility-administered medications for this visit.       SOCIAL HISTORY:  Social History     Socioeconomic History    Marital status:      Spouse name: None    Number of children: None    Years of education: None    Highest education level: None   Occupational History    None   Tobacco Use    Smoking status: Former     Current packs/day: 0.00     Average packs/day: 0.5 packs/day for 30.0 years (15.0 ttl pk-yrs)     Types: Cigarettes     Start date:      Quit date:      Years since quittin.2     "Smokeless tobacco: Former    Tobacco comments:     Patient reports she smoked for 20 years   Vaping Use    Vaping status: Never Used   Substance and Sexual Activity    Alcohol use: Yes     Alcohol/week: 1.0 standard drink of alcohol     Types: 1 Glasses of wine per week     Comment: glass of wine a day    Drug use: No    Sexual activity: Not Currently   Other Topics Concern    None   Social History Narrative    None     Social Determinants of Health     Financial Resource Strain: Low Risk  (10/8/2023)    Overall Financial Resource Strain (CARDIA)     Difficulty of Paying Living Expenses: Not very hard   Food Insecurity: Not on file   Transportation Needs: Unmet Transportation Needs (10/8/2023)    PRAPARE - Transportation     Lack of Transportation (Medical): No     Lack of Transportation (Non-Medical): Yes   Physical Activity: Not on file   Stress: Not on file   Social Connections: Not on file   Intimate Partner Violence: Not on file   Housing Stability: Not on file        REVIEW OF SYSTEMS:  GENERAL: No fever or chills  HEART: No chest pain, or palpitation  RESPIRATORY:  No acute SOB or cough  GI: No Nausea, vomit or diarrhea  NEUROLOGIC: No syncope or acute weakness    PHYSICAL EXAM:    /77   Pulse 69   Ht 5' 2\" (1.575 m)   Wt 73.5 kg (162 lb)   BMI 29.63 kg/m²     VASCULAR EXAM  Dorsalis pedis  +1, Posterior tibial artery  absent.  The patient has class findings with skin atrophy, lack of digital hair, and nail dystrophy.  There is trace lower extremity edema bilaterally.      NEUROLOGIC EXAM  AAO X 3.  Sensation is intact to light touch.  No focal neurologic deficit.          DERMATOLOGIC EXAM:   No ulcer.  No abscess.  No cellulitis noted.  The patient has hypertrophic toenails X 8 with discoloration, onycholysis, and subungal debris.      MUSCULOSKELETAL EXAM:   No acute joint pain.  No acute edema, or redness.  No acute musculoskeletal problem.  Patient has deformity including bunion, right worse " than left.

## 2024-03-28 ENCOUNTER — TELEPHONE (OUTPATIENT)
Dept: INTERNAL MEDICINE CLINIC | Facility: CLINIC | Age: 89
End: 2024-03-28

## 2024-03-28 NOTE — TELEPHONE ENCOUNTER
Patient has been approved for Prolia and is due. Please schedule for after 4/13/24 but within 1 week of that date. Thank you.

## 2024-04-15 ENCOUNTER — CLINICAL SUPPORT (OUTPATIENT)
Dept: INTERNAL MEDICINE CLINIC | Facility: CLINIC | Age: 89
End: 2024-04-15

## 2024-04-15 DIAGNOSIS — M81.0 AGE-RELATED OSTEOPOROSIS WITHOUT CURRENT PATHOLOGICAL FRACTURE: Primary | ICD-10-CM

## 2024-04-19 ENCOUNTER — OFFICE VISIT (OUTPATIENT)
Dept: OBGYN CLINIC | Facility: CLINIC | Age: 89
End: 2024-04-19
Payer: MEDICARE

## 2024-04-19 ENCOUNTER — APPOINTMENT (OUTPATIENT)
Dept: RADIOLOGY | Age: 89
End: 2024-04-19
Payer: MEDICARE

## 2024-04-19 VITALS — BODY MASS INDEX: 29.81 KG/M2 | WEIGHT: 162 LBS | HEIGHT: 62 IN

## 2024-04-19 DIAGNOSIS — M25.562 PAIN IN BOTH KNEES, UNSPECIFIED CHRONICITY: ICD-10-CM

## 2024-04-19 DIAGNOSIS — M17.0 BILATERAL PRIMARY OSTEOARTHRITIS OF KNEE: Primary | ICD-10-CM

## 2024-04-19 DIAGNOSIS — M25.561 PAIN IN BOTH KNEES, UNSPECIFIED CHRONICITY: ICD-10-CM

## 2024-04-19 PROCEDURE — 20610 DRAIN/INJ JOINT/BURSA W/O US: CPT

## 2024-04-19 PROCEDURE — 73564 X-RAY EXAM KNEE 4 OR MORE: CPT

## 2024-04-19 PROCEDURE — 99204 OFFICE O/P NEW MOD 45 MIN: CPT | Performed by: STUDENT IN AN ORGANIZED HEALTH CARE EDUCATION/TRAINING PROGRAM

## 2024-04-19 RX ORDER — BUPIVACAINE HYDROCHLORIDE 2.5 MG/ML
1 INJECTION, SOLUTION INFILTRATION; PERINEURAL
Status: COMPLETED | OUTPATIENT
Start: 2024-04-19 | End: 2024-04-19

## 2024-04-19 RX ORDER — BETAMETHASONE SODIUM PHOSPHATE AND BETAMETHASONE ACETATE 3; 3 MG/ML; MG/ML
12 INJECTION, SUSPENSION INTRA-ARTICULAR; INTRALESIONAL; INTRAMUSCULAR; SOFT TISSUE
Status: COMPLETED | OUTPATIENT
Start: 2024-04-19 | End: 2024-04-19

## 2024-04-19 RX ORDER — LIDOCAINE HYDROCHLORIDE 10 MG/ML
1 INJECTION, SOLUTION INFILTRATION; PERINEURAL
Status: COMPLETED | OUTPATIENT
Start: 2024-04-19 | End: 2024-04-19

## 2024-04-19 RX ADMIN — BUPIVACAINE HYDROCHLORIDE 1 ML: 2.5 INJECTION, SOLUTION INFILTRATION; PERINEURAL at 14:15

## 2024-04-19 RX ADMIN — BETAMETHASONE SODIUM PHOSPHATE AND BETAMETHASONE ACETATE 12 MG: 3; 3 INJECTION, SUSPENSION INTRA-ARTICULAR; INTRALESIONAL; INTRAMUSCULAR; SOFT TISSUE at 14:15

## 2024-04-19 RX ADMIN — LIDOCAINE HYDROCHLORIDE 1 ML: 10 INJECTION, SOLUTION INFILTRATION; PERINEURAL at 14:15

## 2024-04-19 NOTE — ASSESSMENT & PLAN NOTE
Patient has bilateral end stage knee osteoarthritis L>R  -WBAT  -Activity modification to limit strain or impact on the joint  -NSAIDs as needed  -Tylenol 1000mg up to three times daily as needed. Do not exceed 3000mg daily  -Supervised physical therapy. Script provided   -Home exercise program directed by PT  -Weight loss discussed   -Knee sleeve or brace for comfort  -Cane or walker recommended to offload joint  -Corticosteroid injection was offered, accepted, and administered to bilateral knee.  Risk benefits and alternatives were discussed prior to injection.  Patient tolerated the procedure well.  -Patient may follow up prn for further evaluation and treatment

## 2024-04-19 NOTE — PROGRESS NOTES
Date: 24  Droi Forbes   MRN# [id number]  : 1935      Chief Complaint: Bilateral Knee Pain L>R    Assessment and Plan:  The patient verbalized understanding of exam findings and treatment plan. We engaged in the shared decision-making process and treatment options were discussed at length with the patient. Surgical and conservative management discussed today along with risks and benefits. Patient was agreeable with the plan and all questions were answered to satisfaction.     Bilateral primary osteoarthritis of knee  Patient has bilateral end stage knee osteoarthritis L>R  -WBAT  -Activity modification to limit strain or impact on the joint  -NSAIDs as needed  -Tylenol 1000mg up to three times daily as needed. Do not exceed 3000mg daily  -Supervised physical therapy. Script provided   -Home exercise program directed by PT  -Weight loss discussed   -Knee sleeve or brace for comfort  -Cane or walker recommended to offload joint  -Corticosteroid injection was offered, accepted, and administered to bilateral knee.  Risk benefits and alternatives were discussed prior to injection.  Patient tolerated the procedure well.  -Patient may follow up prn for further evaluation and treatment        Subjective:     Knee Pain  Patient presents to the clinic today with complaints of bilateral knee pain. This is evaluated as a personal injury. The pain began a few years ago. The pain is located medial, lateral and rates their pain as 7/10. She describes the symptoms as aching. Symptoms improve with rest. The symptoms are worse with activity. The knee has not given out or felt unstable. The patient can bend and straighten the knee fully.  The patient is active in none. Treatment to date has been Tylenol, without significant relief. No orthopedic PMH noted.    External Records Reviewed: none    Prior treatment:  NSAIDs Yes    Bracing No   Physical Therapy No   Cortisone Injections No   Viscosupplementation No  "    Allergy:  No Known Allergies  Medications:  All current active meds have been reviewed   Past Medical History:  Past Medical History:   Diagnosis Date    Hypertension     Osteoporosis      Past Surgical History:  History reviewed. No pertinent surgical history.  Family History:  Family History   Problem Relation Age of Onset    Asthma Mother     Stroke Father     Hypertension Father      Social History:  Social History     Substance and Sexual Activity   Alcohol Use Yes    Alcohol/week: 1.0 standard drink of alcohol    Types: 1 Glasses of wine per week    Comment: glass of wine a day     Social History     Substance and Sexual Activity   Drug Use No     Social History     Tobacco Use   Smoking Status Former    Current packs/day: 0.00    Average packs/day: 0.5 packs/day for 30.0 years (15.0 ttl pk-yrs)    Types: Cigarettes    Start date:     Quit date:     Years since quittin.3   Smokeless Tobacco Former   Tobacco Comments    Patient reports she smoked for 20 years           Review of Systems:  General- denies fever/chills  HEENT- denies hearing loss or sore throat  Eyes- denies eye pain or visual disturbances, denies red eyes  Respiratory- denies cough or SOB  Cardio- denies chest pain or palpitations  GI- denies abdominal pain  Endocrine- denies urinary frequency  Urinary- denies pain with urination  Musculoskeletal- Negative except noted above  Skin- denies rashes or wounds  Neurological- denies dizziness or headache  Psychiatric- denies anxiety or difficulty concentrating      Objective:   BP Readings from Last 1 Encounters:   24 135/77      Wt Readings from Last 1 Encounters:   24 73.5 kg (162 lb)      Pulse Readings from Last 1 Encounters:   24 69        BMI: Estimated body mass index is 29.63 kg/m² as calculated from the following:    Height as of this encounter: 5' 2\" (1.575 m).    Weight as of this encounter: 73.5 kg (162 lb).      Physical Exam  Ht 5' 2\" (1.575 m)   Wt " 73.5 kg (162 lb)   BMI 29.63 kg/m²   General/Constitutional: No apparent distress: well-nourished and well developed.  Eyes: normal ocular motion  Cardio: RRR, Normal S1S2, No m/r/g.   Lymphatic: No appreciable lymphadenopathy  Respiratory: Non-labored breathing, CTA b/l no w/c/r  Vascular: No edema, swelling or tenderness, except as noted in detailed exam. Extremities well perfused. No LE edema  Integumentary: No impressive skin lesions present, except as noted in detailed exam.  Neuro: No ataxia or tremors noted  Psych: Normal mood and affect, oriented to person, place and time. Appropriate affect.  Musculoskeletal: Normal, except as noted in detailed exam and in HPI.    Gait and Station:   normal    Left Knee Exam:      Inspection:  normal color, temperature, turgor and moisture    Overall limb alignment: neutral    Effusion: no    ROM 0 to 120 without pain    Extensor Lag: Absent    Palpation: Lateral joint line tenderness to palpation    stable to AP translation at 90 deg    Coronal plane stable in full extension    Coronal plane stable in mid-flexion     Motor: 5/5 EHL/FHL/TA/GS/Qd/Hs    Vascular: Toes WWP with BCR    Sensory: SILT DP/SP/Nusrat/Saph/Ti      Images:  I personally reviewed relevant images in the PACS system and my interpretation is as follows:    X-rays of the left and right knee: end stage joint space narrowing, subchondral sclerosis, subchondral cysts, osteophyte formation. No fracture or dislocation.     Large joint arthrocentesis: R knee  Universal Protocol:  Consent: Verbal consent obtained.  Consent given by: patient  Timeout called at: 4/19/2024 3:01 PM.  Patient understanding: patient states understanding of the procedure being performed  Patient identity confirmed: verbally with patient  Supporting Documentation  Indications: pain and diagnostic evaluation   Procedure Details  Location: knee - R knee  Needle size: 22 G  Ultrasound guidance: no  Approach: anterolateral  Medications  administered: 12 mg betamethasone acetate-betamethasone sodium phosphate 6 (3-3) mg/mL; 1 mL bupivacaine 0.25 %; 1 mL lidocaine 1 %    Patient tolerance: patient tolerated the procedure well with no immediate complications  Dressing:  Sterile dressing applied      Large joint arthrocentesis: L knee  Universal Protocol:  Consent: Verbal consent obtained.  Consent given by: patient  Timeout called at: 4/19/2024 3:01 PM.  Patient understanding: patient states understanding of the procedure being performed  Patient identity confirmed: verbally with patient  Supporting Documentation  Indications: pain and diagnostic evaluation   Procedure Details  Location: knee - L knee  Needle size: 22 G  Ultrasound guidance: no  Approach: anterolateral  Medications administered: 12 mg betamethasone acetate-betamethasone sodium phosphate 6 (3-3) mg/mL; 1 mL bupivacaine 0.25 %; 1 mL lidocaine 1 %    Patient tolerance: patient tolerated the procedure well with no immediate complications  Dressing:  Sterile dressing applied            Scribe Attestation      I,:  Oneil Coon PA-C am acting as a scribe while in the presence of the attending physician.:       I,:  Bennie Oconnor MD personally performed the services described in this documentation    as scribed in my presence.:             Bennie Oconnor MD  Adult Reconstruction Specialist   [facility]

## 2024-05-02 ENCOUNTER — EVALUATION (OUTPATIENT)
Dept: PHYSICAL THERAPY | Age: 89
End: 2024-05-02
Payer: MEDICARE

## 2024-05-02 DIAGNOSIS — M17.0 BILATERAL PRIMARY OSTEOARTHRITIS OF KNEE: Primary | ICD-10-CM

## 2024-05-02 PROCEDURE — 97162 PT EVAL MOD COMPLEX 30 MIN: CPT | Performed by: PHYSICAL THERAPIST

## 2024-05-02 PROCEDURE — 97110 THERAPEUTIC EXERCISES: CPT | Performed by: PHYSICAL THERAPIST

## 2024-05-02 NOTE — PROGRESS NOTES
PT Evaluation     Today's date: 2024  Patient name: Dori Forbes  : 1935  MRN: [id number]  Referring provider: Oneil Coon PA-C  Dx:   Encounter Diagnosis     ICD-10-CM    1. Bilateral primary osteoarthritis of knee  M17.0 Ambulatory Referral to Physical Therapy                     Assessment  Assessment details: PT IE: 2024.  Patient reported left > right knee pain.  Patient noted she had bilateral knee pain that radiates into the tibial and ankle region.  Patient noted she had bilateral knee injections 24.  Patient noted her bilateral knee pain is decreased with injections but she still has bilateral tibial and ankle pain.  Patient noted she was using the treadmill but noted she stopped since her pain was aggravated.  Patient noted she stopped the treadmill about 3 weeks ago.  Patient noted stair descent, lifting left lower extremity out of the car during car transfers.  Patient noted bilateral lower extremity cramping limits sleep, not pain.  Patient denies gait deficits except for transfers and taking several steps after prolonged sitting.  Patient denies use of spc.  Patient denies falls.  Patient noted only one episode of left knee buckle in her past without fall.  Patient denies bilateral lower extremity weakness.  Patient noted she has an ache or soreness in the bilateral tibial and ankle region. Patient denies bilateral knee edema.  Patient noted stair ascent is without pain limitations, but descent is pain limited.  Patient denies unilateral knee crepitus, locking and grinding sensation.  Patient exhibits intermittent bilateral tibial to ankle ache type symptoms that when present sitting she will walk to reduce her symptoms.  Impairments: abnormal gait, abnormal or restricted ROM, abnormal movement, activity intolerance, lacks appropriate home exercise program and pain with function  Understanding of Dx/Px/POC: excellent   Prognosis: good  Prognosis details: Patient is a 88  y.o. year old female seen for outpatient PT evaluation with pain, mobility and functional deficits due to bilateral knee pain secondary to bilateral OA. Patient presents to PT IE with the following problems, concerns, deficits and impairments: bilateral knee pain, decreased bilateral lower extremity range of motion, decreased bilateral lower extremity strength, + TTP, transfer dysfunctions, gait and stair dysfunctions, functional limitations and decreased tolerance to activity.  Patient would benefit from skilled PT services under the following PT treatment plan to address the above noted deficits: therapeutic exercises and activities to facilitate right le rom and strength, modalities, manual therapy techniques, gait and stair training, transfer training, balance and proprioception activities, IASTM techniques, Kinesio taping techniques and a hep.  Thank you for the referral.     Goals  Short Term goals - 4 weeks  1.  Patient will be independent HEP.   2.  Patient will report a 25 - 50% decrease in pain complaints.  3.  Increase strength 1/2 grade.  4.  Increase ROM 5-10 degrees.    Long Term goals - 8 weeks  1.  Patient will report elimination of pain complaints.  2.  Patient will return to all recreational activities without restriction.  3.  ROM WFL.  4.  Strength 5/5.  5.  Patient will report walking is without deficits due to without right knee pain limitations.  6.  Patient will report house hold stair climbing is without deficits due to without right knee pain limitations.  7.  Patient will report static standing activities improved during her house hold activities due to without right knee pain limitations.  8.  Patient will denies right knee pain limitations with all transfers.    Plan  Patient would benefit from: skilled physical therapy  Planned modality interventions: cryotherapy, TENS, thermotherapy: hydrocollator packs and unattended electrical stimulation  Planned therapy interventions: joint  mobilization, manual therapy, massage, balance, balance/weight bearing training, neuromuscular re-education, patient education, body mechanics training, postural training, compression, self care, strengthening, stretching, therapeutic activities, therapeutic exercise, therapeutic training, transfer training, flexibility, functional ROM exercises, gait training, graded activity, graded exercise, graded motor and home exercise program  Frequency: 2x week  Duration in weeks: 8  Treatment plan discussed with: patient      Subjective Evaluation    History of Present Illness  Mechanism of injury: Precautions: Patient's PMHx is remarkable for GERD without esophagitis, Aortic stenosis, mild, DJD (degenerative joint disease), multiple sites, Osteoporosis, Psoriasis, Glaucoma, and HTN.         X-rays of the left and right knee: end stage joint space narrowing, subchondral sclerosis, subchondral cysts, osteophyte formation. No fracture or dislocation.     Patient Goals  Patient goals for therapy: decreased pain, increased strength, independence with ADLs/IADLs, return to sport/leisure activities, decreased edema and increased motion  Patient goal: to move better  Pain  At best pain ratin  At worst pain ratin  Location: bilateral knees        Objective     Tenderness     Additional Tenderness Details  Patient is + minimal TTP at left lateral joint line.    Active Range of Motion     Lumbar   Flexion: 98 degrees   Extension: 12 degrees  with pain  Left Hip   Flexion: 70 degrees   Abduction: 18 degrees     Right Hip   Flexion: 78 degrees   Abduction: 22 degrees   Left Knee   Flexion: 96 degrees   Extension: -5 degrees   Extensor la degrees     Right Knee   Flexion: 98 degrees   Extension: -6 degrees   Extensor lag: 10 degrees   Left Ankle/Foot   Dorsiflexion (ke): 6 degrees   Plantar flexion: 36 degrees     Right Ankle/Foot   Dorsiflexion (ke): 4 degrees with pain  Plantar flexion: 30 degrees     Strength/Myotome  Testing     Left Hip   Planes of Motion   Flexion: 4  Extension: 4  Abduction: 4+  Adduction: 5    Right Hip   Planes of Motion   Flexion: 4  Extension: 4  Abduction: 4+  Adduction: 5    Left Knee   Flexion: 4+  Extension: 4-    Right Knee   Flexion: 5  Extension: 4    Left Ankle/Foot   Dorsiflexion: 5  Plantar flexion: 5    Right Ankle/Foot   Dorsiflexion: 5  Plantar flexion: 5    Ambulation     Ambulation: Level Surfaces   Ambulation without assistive device: independent    Additional Level Surfaces Ambulation Details  Patient ambulates with decrease in pace, decrease in bilateral knee extension with mid stance phase of gait.    Ambulation: Stairs   Ascend stairs: independent  Pattern: reciprocal  Railings: two rails  Descend stairs: independent  Pattern: non-reciprocal  Railings: two rails    Additional Stairs Ambulation Details  Patient performs stairs ascent in a reciprocal pattern with bilateral ue support via railing and descent in a non reciprocal pattern.                  Precautions: Precautions: Patient's PMHx is remarkable for GERD without esophagitis, Aortic stenosis, mild, DJD (degenerative joint disease), multiple sites, Osteoporosis, Psoriasis, Glaucoma, and HTN.      Access Code: [email or web address]  URL: [email or web address]  Date: 05/02/2024  Prepared by: Latrell Matthews    Exercises  - Seated Lumbar Flexion Stretch  - 3 x daily - 7 x weekly - 2 sets - 5 reps      Manuals 5/2                                                                Neuro Re-Ed                                                                                                        Ther Ex             bike             Seated repeated lumbar spine flexion 5 x 2 hep            Knee flexion stretch off second step:B:             Ankle DF stretch off the step:B:             LAQ:B:             Hip flexion:B:             Hamstring stretch with strap:B:             Gastrocnemius stretch with strap:R             slr flexion:B:         "     Bridges             SAQ:B:             Heel slides:L             Standing slr x 3:B:             Standing hamstring curls:B:             Standing heel raises and toe raises             SLS and Tandem stance:B:             Lunges:B:             Mini squats             Forward step ups:R 6\"            Lateral step ups:R 6\"            Step downs:R 6\"                                      Ther Activity                                       Gait Training                                       Modalities             MHP to right knee seated 10 min                            "

## 2024-05-06 ENCOUNTER — OFFICE VISIT (OUTPATIENT)
Dept: PHYSICAL THERAPY | Age: 89
End: 2024-05-06
Payer: MEDICARE

## 2024-05-06 DIAGNOSIS — M17.0 BILATERAL PRIMARY OSTEOARTHRITIS OF KNEE: Primary | ICD-10-CM

## 2024-05-06 PROCEDURE — 97110 THERAPEUTIC EXERCISES: CPT

## 2024-05-06 NOTE — PROGRESS NOTES
"Daily Note     Today's date: 2024  Patient name: Dori Forbes  : 1935  MRN: [id number]  Referring provider: Oneil Coon PA-C  Dx:   Encounter Diagnosis     ICD-10-CM    1. Bilateral primary osteoarthritis of knee  M17.0                      Subjective: Pt reported increased B knee pain today.       Objective: See treatment diary below      Assessment: Tolerated treatment fairly well. Progress as able       Plan: Cont with plan of care      Precautions: Precautions: Patient's PMHx is remarkable for GERD without esophagitis, Aortic stenosis, mild, DJD (degenerative joint disease), multiple sites, Osteoporosis, Psoriasis, Glaucoma, and HTN.      Access Code: [email or web address]  URL: [email or web address]  Date: 2024  Prepared by: Latrell Matthews    Exercises  - Seated Lumbar Flexion Stretch  - 3 x daily - 7 x weekly - 2 sets - 5 reps      Manuals /                        Neuro Re-Ed                                       Ther Ex             bike  10 min            Seated repeated lumbar spine flexion 5 x 2 hep 20x 2sec            Knee flexion stretch off second step:B:  NT           Ankle DF stretch off the step:B:  NT           LAQ:B:             Hip flexion:B:  20X            Hamstring stretch with strap:B:  20SEC 3X            Gastrocnemius stretch with strap:R  NT           slr flexion:B:  20X            Bridges  20X 2SEC            SAQ:B:  20X 3SEC            Heel slides:L  20X            Standing slr x 3:B:  20X            Standing hamstring curls:B:  20X            Standing heel raises and toe raises  NT            SLS and Tandem stance:B:  NT            Lunges:B:  NT            Mini squats  20X            Forward step ups:R 6\" NT            Lateral step ups:R 6\" NT            Step downs:R 6\" NT                                      Ther Activity                                       Gait Training                                       Modalities             MHP to right knee " seated 10 min

## 2024-05-08 ENCOUNTER — OFFICE VISIT (OUTPATIENT)
Dept: PHYSICAL THERAPY | Age: 89
End: 2024-05-08
Payer: MEDICARE

## 2024-05-08 DIAGNOSIS — M17.0 BILATERAL PRIMARY OSTEOARTHRITIS OF KNEE: Primary | ICD-10-CM

## 2024-05-08 PROCEDURE — 97110 THERAPEUTIC EXERCISES: CPT | Performed by: PHYSICAL THERAPIST

## 2024-05-08 PROCEDURE — 97112 NEUROMUSCULAR REEDUCATION: CPT | Performed by: PHYSICAL THERAPIST

## 2024-05-08 NOTE — PROGRESS NOTES
"Daily Note     Today's date: 2024  Patient name: Dori Forbes  : 1935  MRN: [id number]  Referring provider: Oneil Coon PA-C  Dx:   Encounter Diagnosis     ICD-10-CM    1. Bilateral primary osteoarthritis of knee  M17.0                      Subjective: Patient reported she had left knee pain this morning despite \"feeling really well\" after last PT visit.       Objective: See treatment diary below.      Assessment: Patient presents with ability to perform open kinetic chain activities without bilateral knee pain.  But, patient held closed kinetic chain activities due to establishing a hep that promotes pain reduction and elimination while concentrating on improving mobility and functional progress.  PT to promote strengthening in open kinetic chain to promote strength and functional mobility as well as promote pain reduction and progress to standing based functional progress.       Plan: Cont with plan of care      Precautions: Precautions: Patient's PMHx is remarkable for GERD without esophagitis, Aortic stenosis, mild, DJD (degenerative joint disease), multiple sites, Osteoporosis, Psoriasis, Glaucoma, and HTN.    Access Code: [email or web address]  URL: [email or web address]  Date: 2024  Prepared by: Latrell Matthews    Exercises  - Seated Lumbar Flexion Stretch  - 3 x daily - 7 x weekly - 1-2 sets - 10 reps  - Seated Long Arc Quad  - 1 x daily - 7 x weekly - 2 sets - 10 reps  - Seated March  - 1 x daily - 7 x weekly - 2 sets - 10 reps  - Hooklying Hamstring Stretch with Strap  - 1 x daily - 7 x weekly - 1 sets - 5 reps - 20 seconds hold  - Supine Active Straight Leg Raise  - 1 x daily - 7 x weekly - 2 sets - 10 reps  - Supine Bridge  - 1 x daily - 7 x weekly - 2 sets - 10 reps  - Supine Heel Slide  - 1 x daily - 7 x weekly - 2 sets - 10 reps  - Supine Short Arc Quad  - 1 x daily - 7 x weekly - 2 sets - 10 reps      Manuals                        Neuro Re-Ed                      " "                 Ther Ex             bike  10 min  10 min          Seated repeated lumbar spine flexion 5 x 2 hep 20x 2sec  2 x 10          Knee flexion stretch off second step:B:  NT NT          Ankle DF stretch off the step:B:  NT NT          LAQ:B:   2 x 10          Hip flexion:B:  20X  2 x 10          Hamstring stretch with strap:B:  20SEC 3X  20 sec x 3          Gastrocnemius stretch with strap:R  NT NT          slr flexion:B:  20X  2 x 10          Bridges  20X 2SEC  2 x 10          SAQ:B:  20X 3SEC  2 x 10          Heel slides:L  20X  2 x 10          Standing slr x 3:B:  20X  NT          Standing hamstring curls:B:  20X  NT          Standing heel raises and toe raises  NT  NT          SLS and Tandem stance:B:  NT  NT          Lunges:B:  NT  NT          Mini squats  20X  NT          Forward step ups:R 6\" NT  NT          Lateral step ups:R 6\" NT  NT          Step downs:R 6\" NT  NT                                    Ther Activity                                       Gait Training                                       Modalities             MHP to right knee seated 10 min  10 min                            "

## 2024-05-13 ENCOUNTER — OFFICE VISIT (OUTPATIENT)
Dept: PHYSICAL THERAPY | Age: 89
End: 2024-05-13
Payer: MEDICARE

## 2024-05-13 DIAGNOSIS — M17.0 BILATERAL PRIMARY OSTEOARTHRITIS OF KNEE: Primary | ICD-10-CM

## 2024-05-13 PROCEDURE — 97110 THERAPEUTIC EXERCISES: CPT

## 2024-05-13 NOTE — PROGRESS NOTES
"Daily Note     Today's date: 2024  Patient name: Dori Forbes  : 1935  MRN: [id number]  Referring provider: Oneil Coon PA-C  Dx:   Encounter Diagnosis     ICD-10-CM    1. Bilateral primary osteoarthritis of knee  M17.0                      Subjective: Patient reported \"My knee still hurts\"       Objective: See treatment diary below.      Assessment: Short term relief of symptoms between sessions.         Plan: Cont with plan of care      Precautions: Precautions: Patient's PMHx is remarkable for GERD without esophagitis, Aortic stenosis, mild, DJD (degenerative joint disease), multiple sites, Osteoporosis, Psoriasis, Glaucoma, and HTN.    Access Code: [email or web address]  URL: [email or web address]  Date: 2024  Prepared by: Latrell Matthews    Exercises  - Seated Lumbar Flexion Stretch  - 3 x daily - 7 x weekly - 1-2 sets - 10 reps  - Seated Long Arc Quad  - 1 x daily - 7 x weekly - 2 sets - 10 reps  - Seated March  - 1 x daily - 7 x weekly - 2 sets - 10 reps  - Hooklying Hamstring Stretch with Strap  - 1 x daily - 7 x weekly - 1 sets - 5 reps - 20 seconds hold  - Supine Active Straight Leg Raise  - 1 x daily - 7 x weekly - 2 sets - 10 reps  - Supine Bridge  - 1 x daily - 7 x weekly - 2 sets - 10 reps  - Supine Heel Slide  - 1 x daily - 7 x weekly - 2 sets - 10 reps  - Supine Short Arc Quad  - 1 x daily - 7 x weekly - 2 sets - 10 reps      Manuals          Ktape to R knee    MAURILIO          Neuro Re-Ed                                       Ther Ex             bike  10 min  10 min NT         Nustep     10 min          Seated repeated lumbar spine flexion 5 x 2 hep 20x 2sec  2 x 10 20X 3SEC          Knee flexion stretch off second step:B:  NT NT NT         Ankle DF stretch off the step:B:  NT NT NT         LAQ:B:   2 x 10 20X          Hip flexion:B:  20X  2 x 10 20X          Hamstring stretch with strap:B:  20SEC 3X  20 sec x 3 20SEC 5X          Gastrocnemius stretch with " "strap:R  NT NT NT         slr flexion:B:  20X  2 x 10 20X          Bridges  20X 2SEC  2 x 10 20X          SAQ:B:  20X 3SEC  2 x 10 20X 3SEC          Heel slides:L  20X  2 x 10 20X          Standing slr x 3:B:  20X  NT 20X          Standing hamstring curls:B:  20X  NT 20X          Standing heel raises and toe raises  NT  NT 20X          SLS and Tandem stance:B:  NT  NT NT         Lunges:B:  NT  NT NT         Mini squats  20X  NT 20X         Forward step ups:R 6\" NT  NT NT         Lateral step ups:R 6\" NT  NT NT         Step downs:R 6\" NT  NT NT                                   Ther Activity                                       Gait Training                                       Modalities             MHP to right knee seated 10 min  10 min 10 MIN                            "

## 2024-05-15 ENCOUNTER — OFFICE VISIT (OUTPATIENT)
Dept: PHYSICAL THERAPY | Age: 89
End: 2024-05-15
Payer: MEDICARE

## 2024-05-15 DIAGNOSIS — M17.0 BILATERAL PRIMARY OSTEOARTHRITIS OF KNEE: Primary | ICD-10-CM

## 2024-05-15 PROCEDURE — 97110 THERAPEUTIC EXERCISES: CPT

## 2024-05-15 NOTE — PROGRESS NOTES
"Daily Note     Today's date: 2024  Patient name: Dori Forbes  : 1935  MRN: [id number]  Referring provider: Oneil Coon PA-C  Dx:   Encounter Diagnosis     ICD-10-CM    1. Bilateral primary osteoarthritis of knee  M17.0                      Subjective: Patient reported \"My knee still hurts\"       Objective: See treatment diary below.      Assessment: Short term relief of symptoms between sessions.         Plan: Cont with plan of care      Precautions: Precautions: Patient's PMHx is remarkable for GERD without esophagitis, Aortic stenosis, mild, DJD (degenerative joint disease), multiple sites, Osteoporosis, Psoriasis, Glaucoma, and HTN.    Access Code: [email or web address]  URL: [email or web address]  Date: 2024  Prepared by: Latrell Matthews    Exercises  - Seated Lumbar Flexion Stretch  - 3 x daily - 7 x weekly - 1-2 sets - 10 reps  - Seated Long Arc Quad  - 1 x daily - 7 x weekly - 2 sets - 10 reps  - Seated March  - 1 x daily - 7 x weekly - 2 sets - 10 reps  - Hooklying Hamstring Stretch with Strap  - 1 x daily - 7 x weekly - 1 sets - 5 reps - 20 seconds hold  - Supine Active Straight Leg Raise  - 1 x daily - 7 x weekly - 2 sets - 10 reps  - Supine Bridge  - 1 x daily - 7 x weekly - 2 sets - 10 reps  - Supine Heel Slide  - 1 x daily - 7 x weekly - 2 sets - 10 reps  - Supine Short Arc Quad  - 1 x daily - 7 x weekly - 2 sets - 10 reps      Manuals 5/2 5/6 58  515        Ktape to R knee    MAURILIO  NT        Neuro Re-Ed                                       Ther Ex             bike  10 min  10 min NT NT        Nustep     10 min  10 MIN         Seated repeated lumbar spine flexion 5 x 2 hep 20x 2sec  2 x 10 20X 3SEC  20X 3SEC         Knee flexion stretch off second step:B:  NT NT NT NT        Ankle DF stretch off the step:B:  NT NT NT NT        LAQ:B:   2 x 10 20X  20X         Hip flexion:B:  20X  2 x 10 20X  20X         Hamstring stretch with strap:B:  20SEC 3X  20 sec x 3 20SEC 5X  NT    " "    Gastrocnemius stretch with strap:R  NT NT NT NT        slr flexion:B:  20X  2 x 10 20X  20X         Bridges  20X 2SEC  2 x 10 20X  20X         SAQ:B:  20X 3SEC  2 x 10 20X 3SEC  20X 3SEC         Heel slides:L  20X  2 x 10 20X  20X         Standing slr x 3:B:  20X  NT 20X  20X         Standing hamstring curls:B:  20X  NT 20X  20X         Standing heel raises and toe raises  NT  NT 20X  20X         SLS and Tandem stance:B:  NT  NT NT NT        Lunges:B:  NT  NT NT NT         Mini squats  20X  NT 20X 20X         Forward step ups:R 6\" NT  NT NT 20X         Lateral step ups:R 6\" NT  NT NT 20X         Step downs:R 6\" NT  NT NT NT                                   Ther Activity                                       Gait Training                                       Modalities             MHP to right knee seated 10 min  10 min 10 MIN  10 MIN                           "

## 2024-05-16 ENCOUNTER — PROBLEM (OUTPATIENT)
Dept: URBAN - METROPOLITAN AREA CLINIC 6 | Facility: CLINIC | Age: 89
End: 2024-05-16

## 2024-05-16 DIAGNOSIS — Z96.1: ICD-10-CM

## 2024-05-16 DIAGNOSIS — H57.89: ICD-10-CM

## 2024-05-16 DIAGNOSIS — H40.1132: ICD-10-CM

## 2024-05-16 PROCEDURE — 92012 INTRM OPH EXAM EST PATIENT: CPT

## 2024-05-16 ASSESSMENT — VISUAL ACUITY
OD_SC: CF 1FT
OS_SC: 20/50+2

## 2024-05-16 ASSESSMENT — TONOMETRY
OD_IOP_MMHG: 18
OS_IOP_MMHG: 17

## 2024-05-20 ENCOUNTER — OFFICE VISIT (OUTPATIENT)
Dept: PHYSICAL THERAPY | Age: 89
End: 2024-05-20
Payer: MEDICARE

## 2024-05-20 DIAGNOSIS — M17.0 BILATERAL PRIMARY OSTEOARTHRITIS OF KNEE: Primary | ICD-10-CM

## 2024-05-20 PROCEDURE — 97110 THERAPEUTIC EXERCISES: CPT

## 2024-05-20 NOTE — PROGRESS NOTES
"Daily Note     Today's date: 2024  Patient name: Dori Forbes  : 1935  MRN: [id number]  Referring provider: Oneil Coon PA-C  Dx:   Encounter Diagnosis     ICD-10-CM    1. Bilateral primary osteoarthritis of knee  M17.0                      Subjective: Patient reported \"My knee still hurts\"       Objective: See treatment diary below.      Assessment: Short term relief of symptoms between sessions.         Plan: Cont with plan of care      Precautions: Precautions: Patient's PMHx is remarkable for GERD without esophagitis, Aortic stenosis, mild, DJD (degenerative joint disease), multiple sites, Osteoporosis, Psoriasis, Glaucoma, and HTN.    Access Code: [email or web address]  URL: [email or web address]  Date: 2024  Prepared by: Latrell Matthews    Exercises  - Seated Lumbar Flexion Stretch  - 3 x daily - 7 x weekly - 1-2 sets - 10 reps  - Seated Long Arc Quad  - 1 x daily - 7 x weekly - 2 sets - 10 reps  - Seated March  - 1 x daily - 7 x weekly - 2 sets - 10 reps  - Hooklying Hamstring Stretch with Strap  - 1 x daily - 7 x weekly - 1 sets - 5 reps - 20 seconds hold  - Supine Active Straight Leg Raise  - 1 x daily - 7 x weekly - 2 sets - 10 reps  - Supine Bridge  - 1 x daily - 7 x weekly - 2 sets - 10 reps  - Supine Heel Slide  - 1 x daily - 7 x weekly - 2 sets - 10 reps  - Supine Short Arc Quad  - 1 x daily - 7 x weekly - 2 sets - 10 reps      Manuals 5/2 5/6 5/8  515 5/20       Ktape to R knee    MAURILIO  NT NT       Neuro Re-Ed             IASTM to                           Ther Ex             bike  10 min  10 min NT NT NT       Nustep     10 min  10 MIN  10 MIN        Seated repeated lumbar spine flexion 5 x 2 hep 20x 2sec  2 x 10 20X 3SEC  20X 3SEC  20X 3SEC        Knee flexion stretch off second step:B:  NT NT NT NT NT       Ankle DF stretch off the step:B:  NT NT NT NT NT       LAQ:B:   2 x 10 20X  20X  20X        Hip flexion:B:  20X  2 x 10 20X  20X  20X        Hamstring stretch with " "strap:B:  20SEC 3X  20 sec x 3 20SEC 5X  NT 20SEC 3X        Gastrocnemius stretch with strap:R  NT NT NT NT NT       slr flexion:B:  20X  2 x 10 20X  20X  20X        Bridges  20X 2SEC  2 x 10 20X  20X  20X 3SEC        SAQ:B:  20X 3SEC  2 x 10 20X 3SEC  20X 3SEC  20X 3SEC        Heel slides:L  20X  2 x 10 20X  20X  20X        Standing slr x 3:B:  20X  NT 20X  20X  20X        Standing hamstring curls:B:  20X  NT 20X  20X  20X        Standing heel raises and toe raises  NT  NT 20X  20X  20X        SLS and Tandem stance:B:  NT  NT NT NT NT       Lunges:B:  NT  NT NT NT  NT       Mini squats  20X  NT 20X 20X  20X        Forward step ups:R 6\" NT  NT NT 20X  20X        Lateral step ups:R 6\" NT  NT NT 20X  20X        Step downs:R 6\" NT  NT NT NT  NT                                 Ther Activity                                       Gait Training                                       Modalities             MHP to right knee seated 10 min  10 min 10 MIN  10 MIN  10 MIN                          "

## 2024-05-22 ENCOUNTER — APPOINTMENT (OUTPATIENT)
Dept: PHYSICAL THERAPY | Age: 89
End: 2024-05-22
Payer: MEDICARE

## 2024-06-06 ENCOUNTER — OFFICE VISIT (OUTPATIENT)
Dept: PHYSICAL THERAPY | Age: 89
End: 2024-06-06
Payer: MEDICARE

## 2024-06-06 DIAGNOSIS — M17.0 BILATERAL PRIMARY OSTEOARTHRITIS OF KNEE: Primary | ICD-10-CM

## 2024-06-06 PROCEDURE — 97140 MANUAL THERAPY 1/> REGIONS: CPT | Performed by: PHYSICAL THERAPIST

## 2024-06-06 PROCEDURE — 97110 THERAPEUTIC EXERCISES: CPT | Performed by: PHYSICAL THERAPIST

## 2024-06-06 NOTE — PROGRESS NOTES
Daily Note     Today's date: 2024  Patient name: Dori Forbes  : 1935  MRN: [id number]  Referring provider: Oneil Coon PA-C  Dx:   Encounter Diagnosis     ICD-10-CM    1. Bilateral primary osteoarthritis of knee  M17.0                      Subjective: Patient reported lumbar spine pain is more limiting to her standing based functional activities versus bilateral knee pain despite bilateral knee pain persisting at minimal to moderate levels.       Objective: See treatment diary below.      Assessment: Patient presents with lumbar spine flexion bias towards lumbar spine pain reduction.  Patient lacks longs term pain reduction during standing activities, with pain both in the lumbar spine and bilateral knees.  Patient exhibits use of IASTM techniques to left knee and lateral distal lower extremity as creating short term pain reduction and gait progress.  But, PT remains warranted to improve long term lumbar spine and bilateral knee pain reduction and standing based functional mobility so as to promote long term standing and walking based functional progress.         Plan: Cont with plan of care      Precautions: Precautions: Patient's PMHx is remarkable for GERD without esophagitis, Aortic stenosis, mild, DJD (degenerative joint disease), multiple sites, Osteoporosis, Psoriasis, Glaucoma, and HTN.    Access Code: [email or web address]  URL: [email or web address]  Date: 2024  Prepared by: Latrell Matthews    Exercises  - Seated Lumbar Flexion Stretch  - 3 x daily - 7 x weekly - 1-2 sets - 10 reps  - Seated Long Arc Quad  - 1 x daily - 7 x weekly - 2 sets - 10 reps  - Seated March  - 1 x daily - 7 x weekly - 2 sets - 10 reps  - Hooklying Hamstring Stretch with Strap  - 1 x daily - 7 x weekly - 1 sets - 5 reps - 20 seconds hold  - Supine Active Straight Leg Raise  - 1 x daily - 7 x weekly - 2 sets - 10 reps  - Supine Bridge  - 1 x daily - 7 x weekly - 2 sets - 10 reps  - Supine Heel Slide  - 1 x  "daily - 7 x weekly - 2 sets - 10 reps  - Supine Short Arc Quad  - 1 x daily - 7 x weekly - 2 sets - 10 reps      Manuals 5/2 5/6 5/8 5/13 5/15 5/20 6/6      Ktape to R knee    MAURILIO  NT NT       Neuro Re-Ed             IASTM to left knee and anterior tibialis       10 min supine with bolster                   Ther Ex             bike  10 min  10 min NT NT NT NT      Nustep     10 min  10 MIN  10 MIN  10 min      Seated repeated lumbar spine flexion 5 x 2 hep 20x 2sec  2 x 10 20X 3SEC  20X 3SEC  20X 3SEC  2  x 10      Knee flexion stretch off second step:B:  NT NT NT NT NT NT      Ankle DF stretch off the step:B:  NT NT NT NT NT NT      LAQ:B:   2 x 10 20X  20X  20X  2 x 10      Hip flexion:B:  20X  2 x 10 20X  20X  20X  2 x 10      Hamstring stretch with strap:B:  20SEC 3X  20 sec x 3 20SEC 5X  NT 20SEC 3X  NT      Gastrocnemius stretch with strap:R  NT NT NT NT NT NT      slr flexion:B:  20X  2 x 10 20X  20X  20X  NT      Bridges  20X 2SEC  2 x 10 20X  20X  20X 3SEC  NT      SAQ:B:  20X 3SEC  2 x 10 20X 3SEC  20X 3SEC  20X 3SEC  2 x 10      Heel slides:L  20X  2 x 10 20X  20X  20X  NT                   Standing slr x 3:B:  20X  NT 20X  20X  20X  10 x 2      Standing hamstring curls:B:  20X  NT 20X  20X  20X  10 x 2      Standing heel raises and toe raises  NT  NT 20X  20X  20X  2 x 10      SLS and Tandem stance:B:  NT  NT NT NT NT NT      Lunges:B:  NT  NT NT NT  NT NT      Mini squats  20X  NT 20X 20X  20X  2 x 10      Forward step ups:R 6\" NT  NT NT 20X  20X  NT      Lateral step ups:R 6\" NT  NT NT 20X  20X  NT      Step downs:R 6\" NT  NT NT NT  NT NT                                Ther Activity                                       Gait Training                                       Modalities             MHP to right knee seated 10 min  10 min 10 MIN  10 MIN  10 MIN  NT                        "

## 2024-06-10 ENCOUNTER — OFFICE VISIT (OUTPATIENT)
Dept: PHYSICAL THERAPY | Age: 89
End: 2024-06-10
Payer: MEDICARE

## 2024-06-10 DIAGNOSIS — M17.0 BILATERAL PRIMARY OSTEOARTHRITIS OF KNEE: Primary | ICD-10-CM

## 2024-06-10 PROCEDURE — 97110 THERAPEUTIC EXERCISES: CPT

## 2024-06-10 NOTE — PROGRESS NOTES
"Daily Note     Today's date: 2024  Patient name: Dori Forbes  : 1935  MRN: [id number]  Referring provider: Oneil Coon PA-C  Dx:   Encounter Diagnosis     ICD-10-CM    1. Bilateral primary osteoarthritis of knee  M17.0                      Subjective: Patient reported \"I felt great over the weekend, I am in more pain again today\"       Objective: See treatment diary below.      Assessment: Improved tolerance to exercises and decreased pain.       Plan: Cont with plan of care      Precautions: Precautions: Patient's PMHx is remarkable for GERD without esophagitis, Aortic stenosis, mild, DJD (degenerative joint disease), multiple sites, Osteoporosis, Psoriasis, Glaucoma, and HTN.    Access Code: [email or web address]  URL: [email or web address]  Date: 2024  Prepared by: Latrell Matthews    Exercises  - Seated Lumbar Flexion Stretch  - 3 x daily - 7 x weekly - 1-2 sets - 10 reps  - Seated Long Arc Quad  - 1 x daily - 7 x weekly - 2 sets - 10 reps  - Seated March  - 1 x daily - 7 x weekly - 2 sets - 10 reps  - Hooklying Hamstring Stretch with Strap  - 1 x daily - 7 x weekly - 1 sets - 5 reps - 20 seconds hold  - Supine Active Straight Leg Raise  - 1 x daily - 7 x weekly - 2 sets - 10 reps  - Supine Bridge  - 1 x daily - 7 x weekly - 2 sets - 10 reps  - Supine Heel Slide  - 1 x daily - 7 x weekly - 2 sets - 10 reps  - Supine Short Arc Quad  - 1 x daily - 7 x weekly - 2 sets - 10 reps      Manuals 5/2 5/6 5/8 15 5 6/6 6/10     Ktape to R knee    MAURILIO  NT NT  NT     Neuro Re-Ed             IASTM to left knee and anterior tibialis       10 min supine with bolster 10 min                   Ther Ex             bike  10 min  10 min NT NT NT NT NT     Nustep     10 min  10 MIN  10 MIN  10 min 10 MIN      Seated repeated lumbar spine flexion 5 x 2 hep 20x 2sec  2 x 10 20X 3SEC  20X 3SEC  20X 3SEC  2  x 10 20X      Knee flexion stretch off second step:B:  NT NT NT NT NT NT NT     Ankle DF stretch " "off the step:B:  NT NT NT NT NT NT NT     LAQ:B:   2 x 10 20X  20X  20X  2 x 10 20X      Hip flexion:B:  20X  2 x 10 20X  20X  20X  2 x 10 20X      Hamstring stretch with strap:B:  20SEC 3X  20 sec x 3 20SEC 5X  NT 20SEC 3X  NT NT     Gastrocnemius stretch with strap:R  NT NT NT NT NT NT NT     slr flexion:B:  20X  2 x 10 20X  20X  20X  NT 20X      Bridges  20X 2SEC  2 x 10 20X  20X  20X 3SEC  NT 20X      SAQ:B:  20X 3SEC  2 x 10 20X 3SEC  20X 3SEC  20X 3SEC  2 x 10 20X      Heel slides:L  20X  2 x 10 20X  20X  20X  NT 20X                   Standing slr x 3:B:  20X  NT 20X  20X  20X  10 x 2 20X      Standing hamstring curls:B:  20X  NT 20X  20X  20X  10 x 2 20X      Standing heel raises and toe raises  NT  NT 20X  20X  20X  2 x 10 20X      SLS and Tandem stance:B:  NT  NT NT NT NT NT NT     Lunges:B:  NT  NT NT NT  NT NT NT     Mini squats  20X  NT 20X 20X  20X  2 x 10 20X      Forward step ups:R 6\" NT  NT NT 20X  20X  NT NT     Lateral step ups:R 6\" NT  NT NT 20X  20X  NT NT     Step downs:R 6\" NT  NT NT NT  NT NT NT                               Ther Activity                                       Gait Training                                       Modalities             MHP to right knee seated 10 min  10 min 10 MIN  10 MIN  10 MIN  NT 10 MIN                        "

## 2024-06-13 ENCOUNTER — OFFICE VISIT (OUTPATIENT)
Dept: PHYSICAL THERAPY | Age: 89
End: 2024-06-13
Payer: MEDICARE

## 2024-06-13 DIAGNOSIS — M17.0 BILATERAL PRIMARY OSTEOARTHRITIS OF KNEE: Primary | ICD-10-CM

## 2024-06-13 PROCEDURE — 97140 MANUAL THERAPY 1/> REGIONS: CPT

## 2024-06-13 PROCEDURE — 97110 THERAPEUTIC EXERCISES: CPT

## 2024-06-13 NOTE — PROGRESS NOTES
"Daily Note     Today's date: 2024  Patient name: Dori Forbes  : 1935  MRN: [id number]  Referring provider: Oneil Coon PA-C  Dx:   Encounter Diagnosis     ICD-10-CM    1. Bilateral primary osteoarthritis of knee  M17.0                      Subjective: Patient reported \"my left calf hurts more when my back hurts\"       Objective: See treatment diary below.      Assessment: Good tolerance to today's session. Progress as able       Plan: Cont with plan of care      Precautions: Precautions: Patient's PMHx is remarkable for GERD without esophagitis, Aortic stenosis, mild, DJD (degenerative joint disease), multiple sites, Osteoporosis, Psoriasis, Glaucoma, and HTN.    Access Code: [email or web address]  URL: [email or web address]  Date: 2024  Prepared by: Latrell Matthews    Exercises  - Seated Lumbar Flexion Stretch  - 3 x daily - 7 x weekly - 1-2 sets - 10 reps  - Seated Long Arc Quad  - 1 x daily - 7 x weekly - 2 sets - 10 reps  - Seated March  - 1 x daily - 7 x weekly - 2 sets - 10 reps  - Hooklying Hamstring Stretch with Strap  - 1 x daily - 7 x weekly - 1 sets - 5 reps - 20 seconds hold  - Supine Active Straight Leg Raise  - 1 x daily - 7 x weekly - 2 sets - 10 reps  - Supine Bridge  - 1 x daily - 7 x weekly - 2 sets - 10 reps  - Supine Heel Slide  - 1 x daily - 7 x weekly - 2 sets - 10 reps  - Supine Short Arc Quad  - 1 x daily - 7 x weekly - 2 sets - 10 reps      Manuals /2 5/6 5/8 5/13 5/15 5/20 6/6 6/10 6/13    Ktape to R knee    MAURILIO  NT NT  NT     Neuro Re-Ed             IASTM to left knee and anterior tibialis       10 min supine with bolster 10 min  10 min                  Ther Ex             bike  10 min  10 min NT NT NT NT NT NT    Nustep     10 min  10 MIN  10 MIN  10 min 10 MIN  10 MIN     Seated repeated lumbar spine flexion 5 x 2 hep 20x 2sec  2 x 10 20X 3SEC  20X 3SEC  20X 3SEC  2  x 10 20X  20X     Knee flexion stretch off second step:B:  NT NT NT NT NT NT NT NT    Ankle DF " "stretch off the step:B:  NT NT NT NT NT NT NT NT    LAQ:B:   2 x 10 20X  20X  20X  2 x 10 20X  20X     Hip flexion:B:  20X  2 x 10 20X  20X  20X  2 x 10 20X  20X     Hamstring stretch with strap:B:  20SEC 3X  20 sec x 3 20SEC 5X  NT 20SEC 3X  NT NT 20SEC 5X     Gastrocnemius stretch with strap:R  NT NT NT NT NT NT NT 20sec 5x     slr flexion:B:  20X  2 x 10 20X  20X  20X  NT 20X  20x     Bridges  20X 2SEC  2 x 10 20X  20X  20X 3SEC  NT 20X  20x 3sec     SAQ:B:  20X 3SEC  2 x 10 20X 3SEC  20X 3SEC  20X 3SEC  2 x 10 20X  20x 3sec     Heel slides:L  20X  2 x 10 20X  20X  20X  NT 20X                   Standing slr x 3:B:  20X  NT 20X  20X  20X  10 x 2 20X  20X     Standing hamstring curls:B:  20X  NT 20X  20X  20X  10 x 2 20X  20X     Standing heel raises and toe raises  NT  NT 20X  20X  20X  2 x 10 20X  20X     SLS and Tandem stance:B:  NT  NT NT NT NT NT NT NT    Lunges:B:  NT  NT NT NT  NT NT NT NT    Mini squats  20X  NT 20X 20X  20X  2 x 10 20X      Forward step ups:R 6\" NT  NT NT 20X  20X  NT NT NT    Lateral step ups:R 6\" NT  NT NT 20X  20X  NT NT NT    Step downs:R 6\" NT  NT NT NT  NT NT NT NT                              Ther Activity                                       Gait Training                                       Modalities             MHP to right knee seated 10 min  10 min 10 MIN  10 MIN  10 MIN  NT 10 MIN  10 MIN                       "

## 2024-06-17 ENCOUNTER — OFFICE VISIT (OUTPATIENT)
Dept: PHYSICAL THERAPY | Age: 89
End: 2024-06-17
Payer: MEDICARE

## 2024-06-17 DIAGNOSIS — M17.0 BILATERAL PRIMARY OSTEOARTHRITIS OF KNEE: Primary | ICD-10-CM

## 2024-06-17 PROCEDURE — 97110 THERAPEUTIC EXERCISES: CPT

## 2024-06-17 PROCEDURE — 97140 MANUAL THERAPY 1/> REGIONS: CPT

## 2024-06-17 NOTE — PROGRESS NOTES
"Daily Note     Today's date: 2024  Patient name: Dori Forbes  : 1935  MRN: [id number]  Referring provider: Oneil Coon PA-C  Dx:   Encounter Diagnosis     ICD-10-CM    1. Bilateral primary osteoarthritis of knee  M17.0                      Subjective: Patient reported \"my knee still hurts\"       Objective: See treatment diary below.      Assessment: Short term relief of symptoms between sessions. Progress as able       Plan: Cont with plan of care      Precautions: Precautions: Patient's PMHx is remarkable for GERD without esophagitis, Aortic stenosis, mild, DJD (degenerative joint disease), multiple sites, Osteoporosis, Psoriasis, Glaucoma, and HTN.    Access Code: [email or web address]  URL: [email or web address]  Date: 2024  Prepared by: Latrell Matthews    Exercises  - Seated Lumbar Flexion Stretch  - 3 x daily - 7 x weekly - 1-2 sets - 10 reps  - Seated Long Arc Quad  - 1 x daily - 7 x weekly - 2 sets - 10 reps  - Seated March  - 1 x daily - 7 x weekly - 2 sets - 10 reps  - Hooklying Hamstring Stretch with Strap  - 1 x daily - 7 x weekly - 1 sets - 5 reps - 20 seconds hold  - Supine Active Straight Leg Raise  - 1 x daily - 7 x weekly - 2 sets - 10 reps  - Supine Bridge  - 1 x daily - 7 x weekly - 2 sets - 10 reps  - Supine Heel Slide  - 1 x daily - 7 x weekly - 2 sets - 10 reps  - Supine Short Arc Quad  - 1 x daily - 7 x weekly - 2 sets - 10 reps      Manuals 5/2 5/6 5/8 5/13 5/15 5/20 6/6 6/10 6/13 6/17   Ktape to R knee    MAURILIO  NT NT  NT     Neuro Re-Ed             IASTM to left knee and anterior tibialis       10 min supine with bolster 10 min  10 min  10 min                 Ther Ex             bike  10 min  10 min NT NT NT NT NT NT NT   Nustep     10 min  10 MIN  10 MIN  10 min 10 MIN  10 MIN  10 MIN    Seated repeated lumbar spine flexion 5 x 2 hep 20x 2sec  2 x 10 20X 3SEC  20X 3SEC  20X 3SEC  2  x 10 20X  20X  20X    Knee flexion stretch off second step:B:  NT NT NT NT NT NT NT " "NT NT   Ankle DF stretch off the step:B:  NT NT NT NT NT NT NT NT NT   LAQ:B:   2 x 10 20X  20X  20X  2 x 10 20X  20X  20X    Hip flexion:B:  20X  2 x 10 20X  20X  20X  2 x 10 20X  20X  20X    Hamstring stretch with strap:B:  20SEC 3X  20 sec x 3 20SEC 5X  NT 20SEC 3X  NT NT 20SEC 5X  [id number]   Gastrocnemius stretch with strap:R  NT NT NT NT NT NT NT 20sec 5x  20SEC 5X    slr flexion:B:  20X  2 x 10 20X  20X  20X  NT 20X  20x  20X    Bridges  20X 2SEC  2 x 10 20X  20X  20X 3SEC  NT 20X  20x 3sec  20X    SAQ:B:  20X 3SEC  2 x 10 20X 3SEC  20X 3SEC  20X 3SEC  2 x 10 20X  20x 3sec  20x 3sec    Heel slides:L  20X  2 x 10 20X  20X  20X  NT 20X   20x                Standing slr x 3:B:  20X  NT 20X  20X  20X  10 x 2 20X  20X  20x   Standing hamstring curls:B:  20X  NT 20X  20X  20X  10 x 2 20X  20X  20x    Standing heel raises and toe raises  NT  NT 20X  20X  20X  2 x 10 20X  20X  20x   SLS and Tandem stance:B:  NT  NT NT NT NT NT NT NT NT   Lunges:B:  NT  NT NT NT  NT NT NT NT NT   Mini squats  20X  NT 20X 20X  20X  2 x 10 20X   20X   Forward step ups:R 6\" NT  NT NT 20X  20X  NT NT NT NT   Lateral step ups:R 6\" NT  NT NT 20X  20X  NT NT NT NT   Step downs:R 6\" NT  NT NT NT  NT NT NT NT NT                             Ther Activity                                       Gait Training                                       Modalities             MHP to right knee seated 10 min  10 min 10 MIN  10 MIN  10 MIN  NT 10 MIN  10 MIN  10 MIN                      "

## 2024-06-19 ENCOUNTER — OFFICE VISIT (OUTPATIENT)
Dept: PHYSICAL THERAPY | Age: 89
End: 2024-06-19
Payer: MEDICARE

## 2024-06-19 DIAGNOSIS — M17.0 BILATERAL PRIMARY OSTEOARTHRITIS OF KNEE: Primary | ICD-10-CM

## 2024-06-19 PROCEDURE — 97140 MANUAL THERAPY 1/> REGIONS: CPT

## 2024-06-19 PROCEDURE — 97110 THERAPEUTIC EXERCISES: CPT

## 2024-06-19 PROCEDURE — 97150 GROUP THERAPEUTIC PROCEDURES: CPT

## 2024-06-19 NOTE — PROGRESS NOTES
Daily Note     Today's date: 2024  Patient name: Dori Forbes  : 1935  MRN: [id number]  Referring provider: Oneil Coon PA-C  Dx:   Encounter Diagnosis     ICD-10-CM    1. Bilateral primary osteoarthritis of knee  M17.0                      Subjective: Patient reports that she is doing okay. Right now is feeling better - but took two ibuprofen so is feeling pretty good.       Objective: See treatment diary below.    [id number] 1on1 (10 mins)   [id number] IEP   [id number] group   [id number] 1on1 (15 mins)   8428-5422 IEP     25 mins 1on1 tot, group     Assessment: Continued per primary therapist POC. Added pause with LAQ to improve quad motor control. Has good response and improved symptomology following IASTM. Did report some mild 'achy' in anterior shin with bridges likely due to extended position of spine which she reported she has been told she has arthritis in. Overall, doing nicely with good tolerance to overall exercises. Would potentially be able to progress to ankle weights next visit. No evidence skin irritation following MHP.       Plan: Cont with plan of care      Precautions: Precautions: Patient's PMHx is remarkable for GERD without esophagitis, Aortic stenosis, mild, DJD (degenerative joint disease), multiple sites, Osteoporosis, Psoriasis, Glaucoma, and HTN.    Access Code: [email or web address]  URL: [email or web address]  Date: 2024  Prepared by: Latrell Matthews    Exercises  - Seated Lumbar Flexion Stretch  - 3 x daily - 7 x weekly - 1-2 sets - 10 reps  - Seated Long Arc Quad  - 1 x daily - 7 x weekly - 2 sets - 10 reps  - Seated March  - 1 x daily - 7 x weekly - 2 sets - 10 reps  - Hooklying Hamstring Stretch with Strap  - 1 x daily - 7 x weekly - 1 sets - 5 reps - 20 seconds hold  - Supine Active Straight Leg Raise  - 1 x daily - 7 x weekly - 2 sets - 10 reps  - Supine Bridge  - 1 x daily - 7 x weekly - 2 sets - 10 reps  - Supine Heel Slide  - 1 x daily - 7 x weekly - 2 sets  "- 10 reps  - Supine Short Arc Quad  - 1 x daily - 7 x weekly - 2 sets - 10 reps      Manuals 6/19 5/6 5/8 5/13 5/15 5/20 6/6 6/10 6/13 6/17   Ktape to R knee    MAURILIO  NT NT  NT     Neuro Re-Ed             IASTM to left knee and anterior tibialis       10 min supine with bolster 10 min  10 min  10 min                 Ther Ex             bike  10 min  10 min NT NT NT NT NT NT NT   Nustep  10 min   10 min  10 MIN  10 MIN  10 min 10 MIN  10 MIN  10 MIN    Seated repeated lumbar spine flexion 20x 20x 2sec  2 x 10 20X 3SEC  20X 3SEC  20X 3SEC  2  x 10 20X  20X  20X    Knee flexion stretch off second step:B:  NT NT NT NT NT NT NT NT NT   Ankle DF stretch off the step:B:  NT NT NT NT NT NT NT NT NT   LAQ:B: 20x  2 x 10 20X  20X  20X  2 x 10 20X  20X  20X    Hip flexion:B: 20x  20X  2 x 10 20X  20X  20X  2 x 10 20X  20X  20X    Hamstring stretch with strap:B: 10x10\" 20SEC 3X  20 sec x 3 20SEC 5X  NT 20SEC 3X  NT NT 20SEC 5X  [id number]   Gastrocnemius stretch with strap:R 10x10\"  NT NT NT NT NT NT NT 20sec 5x  20SEC 5X    slr flexion:B: 20x B 20X  2 x 10 20X  20X  20X  NT 20X  20x  20X    Bridges 20x  20X 2SEC  2 x 10 20X  20X  20X 3SEC  NT 20X  20x 3sec  20X    SAQ:B: 20x3\" 20X 3SEC  2 x 10 20X 3SEC  20X 3SEC  20X 3SEC  2 x 10 20X  20x 3sec  20x 3sec    Heel slides:L NT  20X  2 x 10 20X  20X  20X  NT 20X   20x                Standing slr x 3:B: 20x  20X  NT 20X  20X  20X  10 x 2 20X  20X  20x   Standing hamstring curls:B: 20x  20X  NT 20X  20X  20X  10 x 2 20X  20X  20x    Standing heel raises and toe raises 20x  NT  NT 20X  20X  20X  2 x 10 20X  20X  20x   SLS and Tandem stance:B:  NT  NT NT NT NT NT NT NT NT   Lunges:B:  NT  NT NT NT  NT NT NT NT NT   Mini squats 20x  20X  NT 20X 20X  20X  2 x 10 20X   20X   Forward step ups:R  NT  NT NT 20X  20X  NT NT NT NT   Lateral step ups:R  NT  NT NT 20X  20X  NT NT NT NT   Step downs:R  NT  NT NT NT  NT NT NT NT NT                             Ther Activity                               "         Gait Training                                       Modalities             MHP to right knee seated NT  10 min 10 MIN  10 MIN  10 MIN  NT 10 MIN  10 MIN  10 MIN

## 2024-06-25 ENCOUNTER — APPOINTMENT (OUTPATIENT)
Dept: PHYSICAL THERAPY | Age: 89
End: 2024-06-25
Payer: MEDICARE

## 2024-06-27 ENCOUNTER — OFFICE VISIT (OUTPATIENT)
Dept: PHYSICAL THERAPY | Age: 89
End: 2024-06-27
Payer: MEDICARE

## 2024-06-27 DIAGNOSIS — M17.0 BILATERAL PRIMARY OSTEOARTHRITIS OF KNEE: Primary | ICD-10-CM

## 2024-06-27 PROCEDURE — 97140 MANUAL THERAPY 1/> REGIONS: CPT | Performed by: PHYSICAL THERAPIST

## 2024-06-27 PROCEDURE — 97110 THERAPEUTIC EXERCISES: CPT | Performed by: PHYSICAL THERAPIST

## 2024-06-27 NOTE — PROGRESS NOTES
Daily Note     Today's date: 2024  Patient name: Dori Forbes  : 1935  MRN: [id number]  Referring provider: Oneil Coon PA-C  Dx:   Encounter Diagnosis     ICD-10-CM    1. Bilateral primary osteoarthritis of knee  M17.0                      Subjective: Patient reports she has intermittent bouts of pain aggravation and pain relief.  Patient noted when her pain is reduced she can walk to and from the local clubhouse while when pain is aggravated she can not make that same trip.       Objective: See treatment diary below.      Assessment: Patient presents with ability to perform both open and closed kinetic activities without pain aggravation.  Patient presents with left > right knee pain aggravation with prolonged weight baring activities thus continued progress with closed kinetic chain activities to promote standing based functional activities.       Plan: Cont with plan of care      Precautions: Precautions: Patient's PMHx is remarkable for GERD without esophagitis, Aortic stenosis, mild, DJD (degenerative joint disease), multiple sites, Osteoporosis, Psoriasis, Glaucoma, and HTN.    Access Code: [email or web address]  URL: [email or web address]  Date: 2024  Prepared by: Latrell Matthews    Exercises  - Seated Lumbar Flexion Stretch  - 3 x daily - 7 x weekly - 1-2 sets - 10 reps  - Seated Long Arc Quad  - 1 x daily - 7 x weekly - 2 sets - 10 reps  - Seated March  - 1 x daily - 7 x weekly - 2 sets - 10 reps  - Hooklying Hamstring Stretch with Strap  - 1 x daily - 7 x weekly - 1 sets - 5 reps - 20 seconds hold  - Supine Active Straight Leg Raise  - 1 x daily - 7 x weekly - 2 sets - 10 reps  - Supine Bridge  - 1 x daily - 7 x weekly - 2 sets - 10 reps  - Supine Heel Slide  - 1 x daily - 7 x weekly - 2 sets - 10 reps  - Supine Short Arc Quad  - 1 x daily - 7 x weekly - 2 sets - 10 reps      Manuals 6/19 6/27  5/6 5/8 5/13 5/15 5/20 6/6 6/10 6/13 6/17   Ktape to R knee      MAURILIO  NT NT  NT    "  Neuro Re-Ed               IASTM to left knee and anterior tibialis  10 min       10 min supine with bolster 10 min  10 min  10 min                   Ther Ex               bike    10 min  10 min NT NT NT NT NT NT NT   Nustep  10 min 10 min    10 min  10 MIN  10 MIN  10 min 10 MIN  10 MIN  10 MIN    Seated repeated lumbar spine flexion 20x 2 x 10  20x 2sec  2 x 10 20X 3SEC  20X 3SEC  20X 3SEC  2  x 10 20X  20X  20X    Knee flexion stretch off second step:B:  NT  NT NT NT NT NT NT NT NT NT   Ankle DF stretch off the step:B:  NT  NT NT NT NT NT NT NT NT NT   LAQ:B: 20x 2 x 10    2 x 10 20X  20X  20X  2 x 10 20X  20X  20X    Hip flexion:B: 20x  2 x 10  20X  2 x 10 20X  20X  20X  2 x 10 20X  20X  20X    Hamstring stretch with strap:B: 10x10\" 10 sec 10  20SEC 3X  20 sec x 3 20SEC 5X  NT 20SEC 3X  NT NT 20SEC 5X  [id number]   Gastrocnemius stretch with strap:R 10x10\"  NT  NT NT NT NT NT NT NT 20sec 5x  20SEC 5X    slr flexion:B: 20x B 2 x 10  20X  2 x 10 20X  20X  20X  NT 20X  20x  20X    Bridges 20x  2 x 10  20X 2SEC  2 x 10 20X  20X  20X 3SEC  NT 20X  20x 3sec  20X    SAQ:B: 20x3\" 3 sec x 20  20X 3SEC  2 x 10 20X 3SEC  20X 3SEC  20X 3SEC  2 x 10 20X  20x 3sec  20x 3sec    Heel slides:L NT  2 x 10  20X  2 x 10 20X  20X  20X  NT 20X   20x                  Standing slr x 3:B: 20x  2# 2 x 10  20X  NT 20X  20X  20X  10 x 2 20X  20X  20x   Standing hamstring curls:B: 20x  2# x 20  20X  NT 20X  20X  20X  10 x 2 20X  20X  20x    Standing heel raises and toe raises 20x  2 x 10  NT  NT 20X  20X  20X  2 x 10 20X  20X  20x   SLS and Tandem stance:B:  NT  NT  NT NT NT NT NT NT NT NT   Lunges:B:  NT  NT  NT NT NT  NT NT NT NT NT   Mini squats 20x  NT  20X  NT 20X 20X  20X  2 x 10 20X   20X   Forward step ups:R  NT  NT  NT NT 20X  20X  NT NT NT NT   Lateral step ups:R  NT  NT  NT NT 20X  20X  NT NT NT NT   Step downs:R  NT  NT  NT NT NT  NT NT NT NT NT                                 Ther Activity                                          "    Gait Training                                             Modalities               MHP to right knee seated NT    10 min 10 MIN  10 MIN  10 MIN  NT 10 MIN  10 MIN  10 MIN

## 2024-07-01 ENCOUNTER — OFFICE VISIT (OUTPATIENT)
Dept: PHYSICAL THERAPY | Age: 89
End: 2024-07-01
Payer: MEDICARE

## 2024-07-01 DIAGNOSIS — M17.0 BILATERAL PRIMARY OSTEOARTHRITIS OF KNEE: Primary | ICD-10-CM

## 2024-07-01 PROCEDURE — 97110 THERAPEUTIC EXERCISES: CPT | Performed by: PHYSICAL THERAPIST

## 2024-07-01 PROCEDURE — 97112 NEUROMUSCULAR REEDUCATION: CPT | Performed by: PHYSICAL THERAPIST

## 2024-07-01 NOTE — PROGRESS NOTES
PT Evaluation    / PT Reassessment      Today's date: 2024  Patient name: Dori Forbes  : 1935  MRN: [id number]  Referring provider: Oneil Coon PA-C  Dx:   Encounter Diagnosis     ICD-10-CM    1. Bilateral primary osteoarthritis of knee  M17.0                      Assessment  Impairments: abnormal gait, abnormal or restricted ROM, abnormal movement, activity intolerance, lacks appropriate home exercise program and pain with function    Assessment details: PT Reassessment: 24.  Patient reported the following progress since onset of PT: decrease in bilateral knee pain, mobility progress in each knee, decrease in oral OTC medication.  But, she noted the following deficits that still persists: popliteal region pain in left > right knee pain, bilateral knee soreness with weight baring functional activities.    PT IE: 2024.  Patient reported left > right knee pain.  Patient noted she had bilateral knee pain that radiates into the tibial and ankle region.  Patient noted she had bilateral knee injections 24.  Patient noted her bilateral knee pain is decreased with injections but she still has bilateral tibial and ankle pain.  Patient noted she was using the treadmill but noted she stopped since her pain was aggravated.  Patient noted she stopped the treadmill about 3 weeks ago.  Patient noted stair descent, lifting left lower extremity out of the car during car transfers.  Patient noted bilateral lower extremity cramping limits sleep, not pain.  Patient denies gait deficits except for transfers and taking several steps after prolonged sitting.  Patient denies use of spc.  Patient denies falls.  Patient noted only one episode of left knee buckle in her past without fall.  Patient denies bilateral lower extremity weakness.  Patient noted she has an ache or soreness in the bilateral tibial and ankle region. Patient denies bilateral knee edema.  Patient noted stair ascent is without pain  limitations, but descent is pain limited.  Patient denies unilateral knee crepitus, locking and grinding sensation.  Patient exhibits intermittent bilateral tibial to ankle ache type symptoms that when present sitting she will walk to reduce her symptoms.  Understanding of Dx/Px/POC: excellent     Prognosis: good  Prognosis details: Patient is a 88 y.o. year old female seen for outpatient PT reevaluation with pain, mobility and functional deficits due to bilateral knee pain secondary to bilateral OA. Patient presents with the progress since onset of PT: decrease in bilateral knee pain, increase in bilateral lower extremity mobility and strength, gait progress and functional progress with self and house hold functional activities.  Patient presents to PT reassessment with the following problems, concerns, deficits and impairments: bilateral knee pain, decreased bilateral lower extremity range of motion, decreased bilateral lower extremity strength, + TTP, transfer dysfunctions, gait and stair dysfunctions, functional limitations and decreased tolerance to activity.  Patient would benefit from skilled PT services under the following PT treatment plan to address the above noted deficits: therapeutic exercises and activities to facilitate right le rom and strength, modalities, manual therapy techniques, gait and stair training, transfer training, balance and proprioception activities, IASTM techniques, Kinesio taping techniques and a hep.  Thank you for the referral.     Goals  Short Term goals - 4 weeks  1.  Patient will be independent HEP.  MET.  2.  Patient will report a 25 - 50% decrease in pain complaints.MET.  3.  Increase strength 1/2 grade.MET.  4.  Increase ROM 5-10 degrees.MET.    Long Term goals - 8 weeks  1.  Patient will report elimination of pain complaints.Partially MET.  2.  Patient will return to all recreational activities without restriction. Partially MET.  3.  ROM WFL.  Partially MET.  4.  Strength  5/5.  Partially MET.  5.  Patient will report walking is without deficits due to without right knee pain limitations.  Partially MET.  6.  Patient will report house hold stair climbing is without deficits due to without right knee pain limitations.  Partially MET.  7.  Patient will report static standing activities improved during her house hold activities due to without right knee pain limitations.  Partially MET.  8.  Patient will denies right knee pain limitations with all transfers.  Partially MET.    Plan  Patient would benefit from: skilled physical therapy  Planned modality interventions: cryotherapy, TENS, thermotherapy: hydrocollator packs and unattended electrical stimulation    Planned therapy interventions: joint mobilization, manual therapy, massage, balance, balance/weight bearing training, neuromuscular re-education, patient education, body mechanics training, postural training, compression, self care, strengthening, stretching, therapeutic activities, therapeutic exercise, therapeutic training, transfer training, flexibility, functional ROM exercises, gait training, graded activity, graded exercise, graded motor and home exercise program    Frequency: 2x week  Duration in weeks: 8  Treatment plan discussed with: patient        Subjective Evaluation    History of Present Illness  Mechanism of injury: Precautions: Patient's PMHx is remarkable for GERD without esophagitis, Aortic stenosis, mild, DJD (degenerative joint disease), multiple sites, Osteoporosis, Psoriasis, Glaucoma, and HTN.         X-rays of the left and right knee: end stage joint space narrowing, subchondral sclerosis, subchondral cysts, osteophyte formation. No fracture or dislocation.     Patient Goals  Patient goals for therapy: decreased pain, increased strength, independence with ADLs/IADLs, return to sport/leisure activities, decreased edema and increased motion  Patient goal: to move better  Pain  At best pain ratin  At worst  pain ratin  Location: bilateral knees          Objective     Tenderness     Additional Tenderness Details  Patient is + minimal TTP at left lateral joint line.    Active Range of Motion     Lumbar   Flexion: 98 degrees   Extension: 12 degrees  with pain  Left Hip   Flexion: 82 degrees   Abduction: 22 degrees     Right Hip   Flexion: 85 degrees   Abduction: 24 degrees   Left Knee   Flexion: 102 degrees   Extension: -4 degrees   Extensor la degrees     Right Knee   Flexion: 104 degrees   Extension: -5 degrees   Extensor lag: 10 degrees   Left Ankle/Foot   Dorsiflexion (ke): 8 degrees   Plantar flexion: 40 degrees     Right Ankle/Foot   Dorsiflexion (ke): 6 degrees with pain  Plantar flexion: 35 degrees     Strength/Myotome Testing     Left Hip   Planes of Motion   Flexion: 4+  Extension: 4+  Abduction: 4+  Adduction: 5    Right Hip   Planes of Motion   Flexion: 4+  Extension: 4+  Abduction: 4+  Adduction: 5    Left Knee   Flexion: 4+  Extension: 4    Right Knee   Flexion: 5  Extension: 4    Left Ankle/Foot   Dorsiflexion: 5  Plantar flexion: 5    Right Ankle/Foot   Dorsiflexion: 5  Plantar flexion: 5    Ambulation     Ambulation: Level Surfaces   Ambulation without assistive device: independent    Additional Level Surfaces Ambulation Details  Patient ambulates with decrease in pace, decrease in bilateral knee extension with mid stance phase of gait.    Ambulation: Stairs   Ascend stairs: independent  Pattern: reciprocal  Railings: two rails  Descend stairs: independent  Pattern: non-reciprocal  Railings: two rails    Additional Stairs Ambulation Details  Patient performs stairs ascent in a reciprocal pattern with bilateral ue support via railing and descent in a non reciprocal pattern.                    Precautions: Precautions: Patient's PMHx is remarkable for GERD without esophagitis, Aortic stenosis, mild, DJD (degenerative joint disease), multiple sites, Osteoporosis, Psoriasis, Glaucoma, and  "HTN.    Access Code: [email or web address]  URL: [email or web address]  Date: 05/08/2024  Prepared by: Latrell Matthews    Exercises  - Seated Lumbar Flexion Stretch  - 3 x daily - 7 x weekly - 1-2 sets - 10 reps  - Seated Long Arc Quad  - 1 x daily - 7 x weekly - 2 sets - 10 reps  - Seated March  - 1 x daily - 7 x weekly - 2 sets - 10 reps  - Hooklying Hamstring Stretch with Strap  - 1 x daily - 7 x weekly - 1 sets - 5 reps - 20 seconds hold  - Supine Active Straight Leg Raise  - 1 x daily - 7 x weekly - 2 sets - 10 reps  - Supine Bridge  - 1 x daily - 7 x weekly - 2 sets - 10 reps  - Supine Heel Slide  - 1 x daily - 7 x weekly - 2 sets - 10 reps  - Supine Short Arc Quad  - 1 x daily - 7 x weekly - 2 sets - 10 reps      Manuals 6/19 6/27 7/1  5/8 5/13 5/15 5/20 6/6 6/10 6/13 6/17   Ktape to R knee      MAURILIO  NT NT  NT     Neuro Re-Ed               IASTM to left knee and anterior tibialis  10 min       10 min supine with bolster 10 min  10 min  10 min                   Ther Ex               bike     10 min NT NT NT NT NT NT NT   Nustep  10 min 10 min 10 min   10 min  10 MIN  10 MIN  10 min 10 MIN  10 MIN  10 MIN    Seated repeated lumbar spine flexion 20x 2 x 10 2 x 10  2 x 10 20X 3SEC  20X 3SEC  20X 3SEC  2  x 10 20X  20X  20X    Knee flexion stretch off second step:B:  NT NT  NT NT NT NT NT NT NT NT   Ankle DF stretch off the step:B:  NT NT  NT NT NT NT NT NT NT NT   LAQ:B: 20x 2 x 10  2 x 10  2 x 10 20X  20X  20X  2 x 10 20X  20X  20X    Hip flexion:B: 20x  2 x 10 2 x 10  2 x 10 20X  20X  20X  2 x 10 20X  20X  20X    Hamstring stretch with strap:B: 10x10\" 10 sec 10 20 sec x 5  20 sec x 3 20SEC 5X  NT 20SEC 3X  NT NT 20SEC 5X  [id number]   Gastrocnemius stretch with strap:R 10x10\"  NT NT  NT NT NT NT NT NT 20sec 5x  20SEC 5X    slr flexion:B: 20x B 2 x 10 2 x 10  2 x 10 20X  20X  20X  NT 20X  20x  20X    Bridges 20x  2 x 10 2 x 10  2 x 10 20X  20X  20X 3SEC  NT 20X  20x 3sec  20X    SAQ:B: 20x3\" 3 sec x 20 2 x 10  2 x " 10 20X 3SEC  20X 3SEC  20X 3SEC  2 x 10 20X  20x 3sec  20x 3sec    Heel slides:L NT  2 x 10 2 x 10  2 x 10 20X  20X  20X  NT 20X   20x                  Standing slr x 3:B: 20x  2# 2 x 10 2# x 20  NT 20X  20X  20X  10 x 2 20X  20X  20x   Standing hamstring curls:B: 20x  2# x 20 2# x 20  NT 20X  20X  20X  10 x 2 20X  20X  20x    Standing heel raises and toe raises 20x  2 x 10 2 x 10  NT 20X  20X  20X  2 x 10 20X  20X  20x   SLS and Tandem stance:B:  NT NT  NT NT NT NT NT NT NT NT   Lunges:B:  NT NT  NT NT NT  NT NT NT NT NT   Mini squats 20x  NT 2 x 10  NT 20X 20X  20X  2 x 10 20X   20X   Forward step ups:R  NT NT  NT NT 20X  20X  NT NT NT NT   Lateral step ups:R  NT NT  NT NT 20X  20X  NT NT NT NT   Step downs:R  NT NT  NT NT NT  NT NT NT NT NT                                 Ther Activity                                             Gait Training                                             Modalities               MHP to right knee seated NT    10 min 10 MIN  10 MIN  10 MIN  NT 10 MIN  10 MIN  10 MIN

## 2024-07-03 ENCOUNTER — OFFICE VISIT (OUTPATIENT)
Dept: PHYSICAL THERAPY | Age: 89
End: 2024-07-03
Payer: MEDICARE

## 2024-07-03 DIAGNOSIS — M17.0 BILATERAL PRIMARY OSTEOARTHRITIS OF KNEE: Primary | ICD-10-CM

## 2024-07-03 PROCEDURE — 97140 MANUAL THERAPY 1/> REGIONS: CPT | Performed by: PHYSICAL THERAPIST

## 2024-07-03 PROCEDURE — 97110 THERAPEUTIC EXERCISES: CPT | Performed by: PHYSICAL THERAPIST

## 2024-07-03 NOTE — PROGRESS NOTES
Daily Note     Today's date: 2024  Patient name: Dori Forbes  : 1935  MRN: [id number]  Referring provider: Oneil Coon PA-C  Dx:   Encounter Diagnosis     ICD-10-CM    1. Bilateral primary osteoarthritis of knee  M17.0                      Subjective: Patient reports she has intermittent bouts of left > right knee pain aggravation with standing based functional activities.  Patient reported minimal bilateral knee pain persists.  Patient noted she has increased her standing activities due to decrease in overall bilateral knee pain reduction, but she reported moderate left > right knee pain limits prolonged standing based functional activities.      Objective: See treatment diary below.      Assessment: Patient presents with bilateral knee strength progress as exhibited by increase in resistance noted below on exercise diary.  Patient presents with weakness and fatigue limiting prolonged standing based therapeutic exercises that mimics standing based deficits.  But, she exhibits an increase in mobility as well prior to knee pain aggravation that allows for swing phase of gait to be improved and less problematic.  Patient presents with left > right knee pain aggravation with prolonged weight baring activities thus continued progress with closed kinetic chain activities to promote standing based functional activities.       Plan: Cont with plan of care        Precautions: Precautions: Patient's PMHx is remarkable for GERD without esophagitis, Aortic stenosis, mild, DJD (degenerative joint disease), multiple sites, Osteoporosis, Psoriasis, Glaucoma, and HTN.    Access Code: [email or web address]  URL: [email or web address]  Date: 2024  Prepared by: Latrell Matthews    Exercises  - Seated Lumbar Flexion Stretch  - 3 x daily - 7 x weekly - 1-2 sets - 10 reps  - Seated Long Arc Quad  - 1 x daily - 7 x weekly - 2 sets - 10 reps  - Seated March  - 1 x daily - 7 x weekly - 2 sets - 10 reps  -  "Hooklying Hamstring Stretch with Strap  - 1 x daily - 7 x weekly - 1 sets - 5 reps - 20 seconds hold  - Supine Active Straight Leg Raise  - 1 x daily - 7 x weekly - 2 sets - 10 reps  - Supine Bridge  - 1 x daily - 7 x weekly - 2 sets - 10 reps  - Supine Heel Slide  - 1 x daily - 7 x weekly - 2 sets - 10 reps  - Supine Short Arc Quad  - 1 x daily - 7 x weekly - 2 sets - 10 reps      Manuals 6/19 6/27 7/1 7/3  5/13 5/15 5/20 6/6 6/10 6/13 6/17   Ktape to R knee      MAURILIO  NT NT  NT     Neuro Re-Ed               IASTM to left knee and anterior tibialis  10 min  10 min     10 min supine with bolster 10 min  10 min  10 min                   Ther Ex               bike      NT NT NT NT NT NT NT   Nustep  10 min 10 min 10 min 10 min  10 min  10 MIN  10 MIN  10 min 10 MIN  10 MIN  10 MIN    Seated repeated lumbar spine flexion 20x 2 x 10 2 x 10 NT  20X 3SEC  20X 3SEC  20X 3SEC  2  x 10 20X  20X  20X    Knee flexion stretch off second step:B:  NT NT NT  NT NT NT NT NT NT NT   Ankle DF stretch off the step:B:  NT NT NT  NT NT NT NT NT NT NT   LAQ:B: 20x 2 x 10  2 x 10 2# x 20  20X  20X  20X  2 x 10 20X  20X  20X    Hip flexion:B: 20x  2 x 10 2 x 10 2# x 20  20X  20X  20X  2 x 10 20X  20X  20X    Hamstring stretch with strap:B: 10x10\" 10 sec 10 20 sec x 5 NT  20SEC 5X  NT 20SEC 3X  NT NT 20SEC 5X  [id number]   Gastrocnemius stretch with strap:R 10x10\"  NT NT NT  NT NT NT NT NT 20sec 5x  20SEC 5X    slr flexion:B: 20x B 2 x 10 2 x 10 1.5# x 20  20X  20X  20X  NT 20X  20x  20X    Bridges 20x  2 x 10 2 x 10 3 sec x 20  20X  20X  20X 3SEC  NT 20X  20x 3sec  20X    SAQ:B: 20x3\" 3 sec x 20 2 x 10 1.5# x 20  20X 3SEC  20X 3SEC  20X 3SEC  2 x 10 20X  20x 3sec  20x 3sec    Heel slides:L NT  2 x 10 2 x 10 1.5# x 20  20X  20X  20X  NT 20X   20x                  Standing slr x 3:B: 20x  2# 2 x 10 2# x 20 2.5# x 20  20X  20X  20X  10 x 2 20X  20X  20x   Standing hamstring curls:B: 20x  2# x 20 2# x 20 2.5# x 20  20X  20X  20X  10 x 2 20X  20X  " "20x    Standing heel raises and toe raises 20x  2 x 10 2 x 10 2 x 10  20X  20X  20X  2 x 10 20X  20X  20x   SLS and Tandem stance:B:  NT NT NT  NT NT NT NT NT NT NT   Lunges:B:  NT NT 2 x 10 on foam with 2.5#  NT NT  NT NT NT NT NT   Mini squats 20x  NT 2 x 10 2 x 10   20X 20X  20X  2 x 10 20X   20X   Forward step ups:R  NT NT 6\" x 20 with 2.5#  NT 20X  20X  NT NT NT NT   Lateral step ups:R  NT NT NT  NT 20X  20X  NT NT NT NT   Step downs:R  NT NT NT  NT NT  NT NT NT NT NT                                 Ther Activity                                             Gait Training                                             Modalities               MHP to right knee seated NT     10 MIN  10 MIN  10 MIN  NT 10 MIN  10 MIN  10 MIN                     "

## 2024-07-08 ENCOUNTER — OFFICE VISIT (OUTPATIENT)
Dept: PHYSICAL THERAPY | Age: 89
End: 2024-07-08
Payer: MEDICARE

## 2024-07-08 DIAGNOSIS — M17.0 BILATERAL PRIMARY OSTEOARTHRITIS OF KNEE: Primary | ICD-10-CM

## 2024-07-08 PROCEDURE — 97110 THERAPEUTIC EXERCISES: CPT | Performed by: PHYSICAL THERAPIST

## 2024-07-08 PROCEDURE — 97140 MANUAL THERAPY 1/> REGIONS: CPT | Performed by: PHYSICAL THERAPIST

## 2024-07-08 NOTE — PROGRESS NOTES
Daily Note     Today's date: 2024  Patient name: Dori Forbes  : 1935  MRN: [id number]  Referring provider: Oneil Coon PA-C  Dx:   Encounter Diagnosis     ICD-10-CM    1. Bilateral primary osteoarthritis of knee  M17.0                      Subjective: Patient reports left knee pain is at 6 of 10.  Patient noted she has increased her standing activities due to decrease in overall bilateral knee pain reduction, but she reported moderate left > right knee pain limits prolonged standing based functional activities.      Objective: See treatment diary below.      Assessment: Patient presents with bilateral knee strength and mobility progress promoting functional progress like walking, transfers and stair climbing improvements since onset of PT.  But, patient presents with left > right knee pain aggravation with prolonged weight baring activities that limits standing based functional activities.  Thus, PT is warranted to facilitate long term bilateral knee pain reduction and bilateral lower extremity strength progress to promote full functional progress during self and house hold activities.         Plan: Cont with plan of care        Precautions: Precautions: Patient's PMHx is remarkable for GERD without esophagitis, Aortic stenosis, mild, DJD (degenerative joint disease), multiple sites, Osteoporosis, Psoriasis, Glaucoma, and HTN.    Access Code: [email or web address]  URL: [email or web address]  Date: 2024  Prepared by: Latrell Matthews    Exercises  - Seated Lumbar Flexion Stretch  - 3 x daily - 7 x weekly - 1-2 sets - 10 reps  - Seated Long Arc Quad  - 1 x daily - 7 x weekly - 2 sets - 10 reps  - Seated March  - 1 x daily - 7 x weekly - 2 sets - 10 reps  - Hooklying Hamstring Stretch with Strap  - 1 x daily - 7 x weekly - 1 sets - 5 reps - 20 seconds hold  - Supine Active Straight Leg Raise  - 1 x daily - 7 x weekly - 2 sets - 10 reps  - Supine Bridge  - 1 x daily - 7 x weekly - 2 sets - 10  "reps  - Supine Heel Slide  - 1 x daily - 7 x weekly - 2 sets - 10 reps  - Supine Short Arc Quad  - 1 x daily - 7 x weekly - 2 sets - 10 reps      Manuals 6/19 6/27 7/1 7/3 7/8   5/15 5/20 6/6 6/10 6/13 6/17   Ktape to R knee        NT NT  NT     Neuro Re-Ed                IASTM to left knee and anterior tibialis  10 min  10 min 10 min     10 min supine with bolster 10 min  10 min  10 min                    Ther Ex                bike        NT NT NT NT NT NT   Nustep  10 min 10 min 10 min 10 min 10 min   10 MIN  10 MIN  10 min 10 MIN  10 MIN  10 MIN    Seated repeated lumbar spine flexion 20x 2 x 10 2 x 10 NT    20X 3SEC  20X 3SEC  2  x 10 20X  20X  20X    Knee flexion stretch off second step:B:  NT NT NT NT   NT NT NT NT NT NT   Ankle DF stretch off the step:B:  NT NT NT NT   NT NT NT NT NT NT   LAQ:B: 20x 2 x 10  2 x 10 2# x 20 2.5# x 20   20X  20X  2 x 10 20X  20X  20X    Hip flexion:B: 20x  2 x 10 2 x 10 2# x 20 2.5# x 20   20X  20X  2 x 10 20X  20X  20X    Hamstring stretch with strap:B: 10x10\" 10 sec 10 20 sec x 5 NT 20 sec x 5   NT 20SEC 3X  NT NT 20SEC 5X  [id number]   Gastrocnemius stretch with strap:R 10x10\"  NT NT NT NT   NT NT NT NT 20sec 5x  20SEC 5X    slr flexion:B: 20x B 2 x 10 2 x 10 1.5# x 20 1.5# x 20   20X  20X  NT 20X  20x  20X    Bridges 20x  2 x 10 2 x 10 3 sec x 20 3 sec x 20   20X  20X 3SEC  NT 20X  20x 3sec  20X    SAQ:B: 20x3\" 3 sec x 20 2 x 10 1.5# x 20 1.5# x 20   20X 3SEC  20X 3SEC  2 x 10 20X  20x 3sec  20x 3sec    Heel slides:L NT  2 x 10 2 x 10 1.5# x 20 1.5# x 20   20X  20X  NT 20X   20x                   Standing slr x 3:B: 20x  2# 2 x 10 2# x 20 2.5# x 20 2.5# x 20   20X  20X  10 x 2 20X  20X  20x   Standing hamstring curls:B: 20x  2# x 20 2# x 20 2.5# x 20 2.5# x 20   20X  20X  10 x 2 20X  20X  20x    Standing heel raises and toe raises 20x  2 x 10 2 x 10 2 x 10 2 x 10   20X  20X  2 x 10 20X  20X  20x   SLS and Tandem stance:B:  NT NT NT NT   NT NT NT NT NT NT   Lunges:B:  NT NT 2 x " "10 on foam with 2.5# 2 x 10 on foam with 2.5#   NT  NT NT NT NT NT   Mini squats 20x  NT 2 x 10 2 x 10  2 x 10   20X  20X  2 x 10 20X   20X   Forward step ups:R  NT NT 6\" x 20 with 2.5# 6\" x 20 with 2.5#   20X  20X  NT NT NT NT   Lateral step ups:R  NT NT NT NT   20X  20X  NT NT NT NT   Step downs:R  NT NT NT NT   NT  NT NT NT NT NT                                   Ther Activity                                                Gait Training                                                Modalities                MHP to right knee seated NT       10 MIN  10 MIN  NT 10 MIN  10 MIN  10 MIN                      "

## 2024-07-10 ENCOUNTER — OFFICE VISIT (OUTPATIENT)
Dept: PHYSICAL THERAPY | Age: 89
End: 2024-07-10
Payer: MEDICARE

## 2024-07-10 DIAGNOSIS — M17.0 BILATERAL PRIMARY OSTEOARTHRITIS OF KNEE: Primary | ICD-10-CM

## 2024-07-10 PROCEDURE — 97110 THERAPEUTIC EXERCISES: CPT | Performed by: PHYSICAL THERAPIST

## 2024-07-10 PROCEDURE — 97140 MANUAL THERAPY 1/> REGIONS: CPT | Performed by: PHYSICAL THERAPIST

## 2024-07-10 NOTE — PROGRESS NOTES
Daily Note     Today's date: 2024  Patient name: Dori Forbes  : 1935  MRN: [id number]  Referring provider: Oneil Coon PA-C  Dx:   Encounter Diagnosis     ICD-10-CM    1. Bilateral primary osteoarthritis of knee  M17.0                      Subjective: Patient reports left knee pain is intermittent and up to a 5 of 10 at its worst.  Patient noted left knee > right knee pain limits prolonged weight baring functional activities, but she continues to perform all her house hold and functional activities despite pain aggravation with weight baring activities.      Objective: See treatment diary below.      Assessment: Patient presents with left knee pain > right knee pain limiting prolonged standing based functional activities.  But, she presents with ability to resume all her own functional activities despite pain aggravation.  Thus, due to functional and impairment progress patient agrees with PT POC to d/c to hep.         Plan: Patient provided with final written hep and d/c to hep.       Precautions: Precautions: Patient's PMHx is remarkable for GERD without esophagitis, Aortic stenosis, mild, DJD (degenerative joint disease), multiple sites, Osteoporosis, Psoriasis, Glaucoma, and HTN.    Access Code: [email or web address]  URL: [email or web address]  Date: 2024  Prepared by: Latrell Matthews    Exercises  - Seated Lumbar Flexion Stretch  - 3 x daily - 7 x weekly - 1-2 sets - 10 reps  - Seated Long Arc Quad  - 1 x daily - 7 x weekly - 2 sets - 10 reps  - Seated March  - 1 x daily - 7 x weekly - 2 sets - 10 reps  - Hooklying Hamstring Stretch with Strap  - 1 x daily - 7 x weekly - 1 sets - 5 reps - 20 seconds hold  - Supine Active Straight Leg Raise  - 1 x daily - 7 x weekly - 2 sets - 10 reps  - Supine Bridge  - 1 x daily - 7 x weekly - 2 sets - 10 reps  - Supine Heel Slide  - 1 x daily - 7 x weekly - 2 sets - 10 reps  - Supine Short Arc Quad  - 1 x daily - 7 x weekly - 2 sets - 10  "reps  Access Code: [email or web address]  URL: [email or web address]  Date: 07/10/2024  Prepared by: Tiffanie Mireles    Exercises  - Supine Active Straight Leg Raise  - 1 x daily - 7 x weekly - 3 sets - 10 reps  - Seated Long Arc Quad  - 1 x daily - 7 x weekly - 3 sets - 10 reps  - Supine Quad Set  - 1 x daily - 7 x weekly - 3 sets - 10 reps  - Supine Hamstring Stretch with Strap  - 1 x daily - 7 x weekly - 3 sets - 10 reps  - Mini Squat with Counter Support  - 1 x daily - 7 x weekly - 3 sets - 10 reps  - Supine Knee Extension Strengthening  - 1 x daily - 7 x weekly - 3 sets - 10 reps  - Standing Knee Flexion Stretch on Step  - 1 x daily - 7 x weekly - 3 sets - 10 reps  - Standing Knee Flexion AROM with Chair Support  - 1 x daily - 7 x weekly - 3 sets - 10 reps        Manuals 6/19 6/27 7/1 7/3 7/8 7/10  5/15 5/20 6/6 6/10 6/13 6/17   Ktape to R knee        NT NT  NT     Neuro Re-Ed                IASTM to left knee and anterior tibialis  10 min  10 min 10 min 10 min    10 min supine with bolster 10 min  10 min  10 min                    Ther Ex                bike        NT NT NT NT NT NT   Nustep  10 min 10 min 10 min 10 min 10 min 10 min  10 MIN  10 MIN  10 min 10 MIN  10 MIN  10 MIN    Seated repeated lumbar spine flexion 20x 2 x 10 2 x 10 NT NT NT  20X 3SEC  20X 3SEC  2  x 10 20X  20X  20X    Knee flexion stretch off second step:B:  NT NT NT NT NT  NT NT NT NT NT NT   Ankle DF stretch off the step:B:  NT NT NT NT NT  NT NT NT NT NT NT   LAQ:B: 20x 2 x 10  2 x 10 2# x 20 2.5# x 20 2.5# x 20  20X  20X  2 x 10 20X  20X  20X    Hip flexion:B: 20x  2 x 10 2 x 10 2# x 20 2.5# x 20 2.5# x 20  20X  20X  2 x 10 20X  20X  20X    Hamstring stretch with strap:B: 10x10\" 10 sec 10 20 sec x 5 NT 20 sec x 5 20 sec x 5  NT 20SEC 3X  NT NT 20SEC 5X  [id number]   Gastrocnemius stretch with strap:R 10x10\"  NT NT NT NT NT  NT NT NT NT 20sec 5x  20SEC 5X    slr flexion:B: 20x B 2 x 10 2 x 10 1.5# x 20 1.5# x 20 2# x 20  20X  20X  NT " "20X  20x  20X    Bridges 20x  2 x 10 2 x 10 3 sec x 20 3 sec x 20 3 sec x 20  20X  20X 3SEC  NT 20X  20x 3sec  20X    SAQ:B: 20x3\" 3 sec x 20 2 x 10 1.5# x 20 1.5# x 20 2# x 20  20X 3SEC  20X 3SEC  2 x 10 20X  20x 3sec  20x 3sec    Heel slides:L NT  2 x 10 2 x 10 1.5# x 20 1.5# x 20 2# x 20  20X  20X  NT 20X   20x                   Standing slr x 3:B: 20x  2# 2 x 10 2# x 20 2.5# x 20 2.5# x 20 2# x 20  20X  20X  10 x 2 20X  20X  20x   Standing hamstring curls:B: 20x  2# x 20 2# x 20 2.5# x 20 2.5# x 20 2# x 20  20X  20X  10 x 2 20X  20X  20x    Standing heel raises and toe raises 20x  2 x 10 2 x 10 2 x 10 2 x 10 2 x 10  20X  20X  2 x 10 20X  20X  20x   SLS and Tandem stance:B:  NT NT NT NT NT  NT NT NT NT NT NT   Lunges:B:  NT NT 2 x 10 on foam with 2.5# 2 x 10 on foam with 2.5# 2 x 10 foam with 2.5#  NT  NT NT NT NT NT   Mini squats 20x  NT 2 x 10 2 x 10  2 x 10 2 x 10  20X  20X  2 x 10 20X   20X   Forward step ups:R  NT NT 6\" x 20 with 2.5# 6\" x 20 with 2.5# 6\" x 20 with 2.5#  20X  20X  NT NT NT NT   Lateral step ups:R  NT NT NT NT NT  20X  20X  NT NT NT NT   Step downs:R  NT NT NT NT NT  NT  NT NT NT NT NT                                   Ther Activity                                                Gait Training                                                Modalities                MHP to right knee seated NT       10 MIN  10 MIN  NT 10 MIN  10 MIN  10 MIN                      "

## 2024-07-26 ENCOUNTER — OFFICE VISIT (OUTPATIENT)
Dept: OBGYN CLINIC | Facility: CLINIC | Age: 89
End: 2024-07-26
Payer: MEDICARE

## 2024-07-26 VITALS
BODY MASS INDEX: 29.81 KG/M2 | HEIGHT: 62 IN | WEIGHT: 162 LBS | DIASTOLIC BLOOD PRESSURE: 82 MMHG | HEART RATE: 66 BPM | SYSTOLIC BLOOD PRESSURE: 143 MMHG

## 2024-07-26 DIAGNOSIS — M17.0 BILATERAL PRIMARY OSTEOARTHRITIS OF KNEE: Primary | ICD-10-CM

## 2024-07-26 PROCEDURE — 99214 OFFICE O/P EST MOD 30 MIN: CPT | Performed by: STUDENT IN AN ORGANIZED HEALTH CARE EDUCATION/TRAINING PROGRAM

## 2024-07-26 PROCEDURE — 20610 DRAIN/INJ JOINT/BURSA W/O US: CPT | Performed by: STUDENT IN AN ORGANIZED HEALTH CARE EDUCATION/TRAINING PROGRAM

## 2024-07-26 RX ADMIN — LIDOCAINE HYDROCHLORIDE 1 ML: 10 INJECTION, SOLUTION INFILTRATION; PERINEURAL at 14:30

## 2024-07-26 RX ADMIN — BETAMETHASONE SODIUM PHOSPHATE AND BETAMETHASONE ACETATE 12 MG: 3; 3 INJECTION, SUSPENSION INTRA-ARTICULAR; INTRALESIONAL; INTRAMUSCULAR; SOFT TISSUE at 14:30

## 2024-07-26 RX ADMIN — BUPIVACAINE HYDROCHLORIDE 1 ML: 2.5 INJECTION, SOLUTION INFILTRATION; PERINEURAL at 14:30

## 2024-07-27 RX ORDER — LIDOCAINE HYDROCHLORIDE 10 MG/ML
1 INJECTION, SOLUTION INFILTRATION; PERINEURAL
Status: COMPLETED | OUTPATIENT
Start: 2024-07-26 | End: 2024-07-26

## 2024-07-27 RX ORDER — BETAMETHASONE SODIUM PHOSPHATE AND BETAMETHASONE ACETATE 3; 3 MG/ML; MG/ML
12 INJECTION, SUSPENSION INTRA-ARTICULAR; INTRALESIONAL; INTRAMUSCULAR; SOFT TISSUE
Status: COMPLETED | OUTPATIENT
Start: 2024-07-26 | End: 2024-07-26

## 2024-07-27 RX ORDER — BUPIVACAINE HYDROCHLORIDE 2.5 MG/ML
1 INJECTION, SOLUTION INFILTRATION; PERINEURAL
Status: COMPLETED | OUTPATIENT
Start: 2024-07-26 | End: 2024-07-26

## 2024-07-27 NOTE — PROGRESS NOTES
Date: 24  Dori Forbes   MRN# [id number]  : 1935      Chief Complaint: Bilateral Knee Pain L>R    Assessment and Plan:  The patient verbalized understanding of exam findings and treatment plan. We engaged in the shared decision-making process and treatment options were discussed at length with the patient. Surgical and conservative management discussed today along with risks and benefits. Patient was agreeable with the plan and all questions were answered to satisfaction.     Bilateral primary osteoarthritis of knee  Patient has bilateral end stage knee osteoarthritis L>R  -WBAT  -Activity modification to limit strain or impact on the joint  -NSAIDs as needed  -Tylenol 1000mg up to three times daily as needed. Do not exceed 3000mg daily  -Supervised physical therapy. Script provided   -Home exercise program directed by PT  -Weight loss discussed   -Knee sleeve or brace for comfort  -Cane or walker recommended to offload joint  -Corticosteroid injection was offered, accepted, and administered to bilateral knee.  Risk benefits and alternatives were discussed prior to injection.  Patient tolerated the procedure well.  -Patient may follow up prn for further evaluation and treatment       Subjective:     Knee Pain  Patient presents to the clinic today for continued valuation and treatment of bilateral knee pain secondary to an established diagnosis of osteoarthritis.  Patient did receive a CSI to bilateral knees during her last visit and she states that the knee has improved significantly from the injections, but there was the insidious return of the knee pain.  She does occasionally take Tylenol, has participated in formal physical therapy with transition to a home exercise program which she states provided significant relief.  She is also currently on Prolia.  No other injuries or complaints.    External Records Reviewed: Previous clinic notes and radiographic reports    Prior  treatment:  NSAIDs Yes    Bracing No   Physical Therapy No   Cortisone Injections No   Viscosupplementation No     Allergy:  No Known Allergies  Medications:  All current active meds have been reviewed   Past Medical History:  Past Medical History:   Diagnosis Date    Hypertension     Osteoporosis      Past Surgical History:  History reviewed. No pertinent surgical history.  Family History:  Family History   Problem Relation Age of Onset    Asthma Mother     Stroke Father     Hypertension Father      Social History:  Social History     Substance and Sexual Activity   Alcohol Use Yes    Alcohol/week: 1.0 standard drink of alcohol    Types: 1 Glasses of wine per week    Comment: glass of wine a day     Social History     Substance and Sexual Activity   Drug Use No     Social History     Tobacco Use   Smoking Status Former    Current packs/day: 0.00    Average packs/day: 0.5 packs/day for 30.0 years (15.0 ttl pk-yrs)    Types: Cigarettes    Start date:     Quit date:     Years since quittin.5   Smokeless Tobacco Former   Tobacco Comments    Patient reports she smoked for 20 years           Review of Systems:  General- denies fever/chills  HEENT- denies hearing loss or sore throat  Eyes- denies eye pain or visual disturbances, denies red eyes  Respiratory- denies cough or SOB  Cardio- denies chest pain or palpitations  GI- denies abdominal pain  Endocrine- denies urinary frequency  Urinary- denies pain with urination  Musculoskeletal- Negative except noted above  Skin- denies rashes or wounds  Neurological- denies dizziness or headache  Psychiatric- denies anxiety or difficulty concentrating      Objective:   BP Readings from Last 1 Encounters:   24 143/82      Wt Readings from Last 1 Encounters:   24 73.5 kg (162 lb)      Pulse Readings from Last 1 Encounters:   24 66        BMI: Estimated body mass index is 29.63 kg/m² as calculated from the following:    Height as of this encounter: 5'  "2\" (1.575 m).    Weight as of this encounter: 73.5 kg (162 lb).      Physical Exam  /82   Pulse 66   Ht 5' 2\" (1.575 m)   Wt 73.5 kg (162 lb)   BMI 29.63 kg/m²   General/Constitutional: No apparent distress: well-nourished and well developed.  Eyes: normal ocular motion  Cardio: RRR, Normal S1S2, No m/r/g.   Lymphatic: No appreciable lymphadenopathy  Respiratory: Non-labored breathing, CTA b/l no w/c/r  Vascular: No edema, swelling or tenderness, except as noted in detailed exam. Extremities well perfused. No LE edema  Integumentary: No impressive skin lesions present, except as noted in detailed exam.  Neuro: No ataxia or tremors noted  Psych: Normal mood and affect, oriented to person, place and time. Appropriate affect.  Musculoskeletal: Normal, except as noted in detailed exam and in HPI.    Gait and Station:   normal    Bilateral knee Exam:      Inspection:  normal color, temperature, turgor and moisture    Overall limb alignment: Partially correctable varus    Effusion: no    ROM 0 to 120 without pain    Extensor Lag: Absent    Palpation: Medial and lateral joint line tenderness to palpation    stable to AP translation at 90 deg    Coronal plane stable in full extension    Coronal plane stable in mid-flexion     Motor: 5/5 EHL/FHL/TA/GS/Qd/Hs    Vascular: Toes WWP with BCR    Sensory: SILT DP/SP/Nusrat/Saph/Ti      Images:  I personally reviewed relevant images in the PACS system and my interpretation is as follows:    X-rays of the left and right knee: end stage joint space narrowing, subchondral sclerosis, subchondral cysts, osteophyte formation. No fracture or dislocation.     Large joint arthrocentesis: R knee  Universal Protocol:  Consent: Verbal consent obtained.  Consent given by: patient  Timeout called at: 4/19/2024 3:01 PM.  Patient understanding: patient states understanding of the procedure being performed  Patient identity confirmed: verbally with patient  Supporting " Documentation  Indications: pain and diagnostic evaluation   Procedure Details  Location: knee - R knee  Needle size: 22 G  Ultrasound guidance: no  Approach: anterolateral  Medications administered: 12 mg betamethasone acetate-betamethasone sodium phosphate 6 (3-3) mg/mL; 1 mL bupivacaine 0.25 %; 1 mL lidocaine 1 %    Patient tolerance: patient tolerated the procedure well with no immediate complications  Dressing:  Sterile dressing applied      Large joint arthrocentesis: L knee  Universal Protocol:  Consent: Verbal consent obtained.  Consent given by: patient  Timeout called at: 4/19/2024 3:01 PM.  Patient understanding: patient states understanding of the procedure being performed  Patient identity confirmed: verbally with patient  Supporting Documentation  Indications: pain and diagnostic evaluation   Procedure Details  Location: knee - L knee  Needle size: 22 G  Ultrasound guidance: no  Approach: anterolateral  Medications administered: 12 mg betamethasone acetate-betamethasone sodium phosphate 6 (3-3) mg/mL; 1 mL bupivacaine 0.25 %; 1 mL lidocaine 1 %    Patient tolerance: patient tolerated the procedure well with no immediate complications  Dressing:  Sterile dressing applied            Scribe Attestation      I,:   am acting as a scribe while in the presence of the attending physician.:       I,:   personally performed the services described in this documentation    as scribed in my presence.:             Bennie Oconnor MD  Adult Reconstruction Specialist   [facility]

## 2024-08-12 ENCOUNTER — OFFICE VISIT (OUTPATIENT)
Dept: PODIATRY | Facility: CLINIC | Age: 89
End: 2024-08-12
Payer: MEDICARE

## 2024-08-12 VITALS
DIASTOLIC BLOOD PRESSURE: 80 MMHG | HEART RATE: 65 BPM | WEIGHT: 162.4 LBS | BODY MASS INDEX: 29.88 KG/M2 | HEIGHT: 62 IN | SYSTOLIC BLOOD PRESSURE: 146 MMHG

## 2024-08-12 DIAGNOSIS — I73.9 PERIPHERAL VASCULAR DISEASE, UNSPECIFIED (HCC): Primary | ICD-10-CM

## 2024-08-12 DIAGNOSIS — B35.1 ONYCHOMYCOSIS: ICD-10-CM

## 2024-08-12 PROCEDURE — 11721 DEBRIDE NAIL 6 OR MORE: CPT | Performed by: PODIATRIST

## 2024-08-12 NOTE — PROGRESS NOTES
"Name: Dori Forbes      : 1935      MRN: [id number]  Encounter Provider: Guillermo Ly DPM  Encounter Date: 2024   Encounter department: [facility] PODIATRY [facility]    Assessment & Plan     1. Peripheral vascular disease, unspecified (HCC)  2. Onychomycosis      Nail debridement completed today 1 through 10 in thickness and in length using a nail nipper and Dremel today.        Return in about 9 weeks (around 10/14/2024) for Routine foot care.    Subjective     Patient returns for follow-up on at risk footcare.  Patient has history of chronic nail fungus with tenderness to the areas.  She denies any current open ulcerations or lesions.        Constitutional:  Negative for chills and fever.   Respiratory:  Negative for chest tightness and shortness of breath.    Gastrointestinal:  Negative for nausea and vomiting.     Current Outpatient Medications on File Prior to Visit   Medication Sig   • brimonidine (Alphagan P) 0.1 %    • HALOG 0.1 % CREA    • omeprazole (PriLOSEC) 20 mg delayed release capsule Take 1 capsule (20 mg total) by mouth daily   • travoprost (TRAVATAN-Z) 0.004 % ophthalmic solution Administer 1 drop to both eyes daily at bedtime   • denosumab (PROLIA) 60 mg/mL Inject 60 mg under the skin every 6 (six) months (Patient not taking: Reported on 2024)       Objective     /80   Pulse 65   Ht 5' 2\" (1.575 m)   Wt 73.7 kg (162 lb 6.4 oz)   BMI 29.70 kg/m²     Vascular: Intact pedal pulses bilateral DP and PT.  Neurological: Gross protective sensation intact bilateral  Musculoskeletal: Muscle strength bilateral intact with dorsiflexion, inversion, eversion and plantarflexion.  Dermatological: No open lesions or ulcerations noted bilateral.    "

## 2024-08-21 DIAGNOSIS — K21.9 GERD WITHOUT ESOPHAGITIS: ICD-10-CM

## 2024-08-21 NOTE — TELEPHONE ENCOUNTER
Reason for call:   [x] Refill   [] Prior Auth  [] Other:     Office:   [x] PCP/Provider - [facility]  [] Specialty/Provider -     Medication:       Does the patient have enough for 3 days?   [] Yes   [x] No - Send as HP to POD

## 2024-08-27 ENCOUNTER — ESTABLISHED COMPREHENSIVE EXAM (OUTPATIENT)
Dept: URBAN - METROPOLITAN AREA CLINIC 6 | Facility: CLINIC | Age: 89
End: 2024-08-27

## 2024-08-27 DIAGNOSIS — H34.8130: ICD-10-CM

## 2024-08-27 DIAGNOSIS — H35.373: ICD-10-CM

## 2024-08-27 DIAGNOSIS — H02.206: ICD-10-CM

## 2024-08-27 DIAGNOSIS — H35.033: ICD-10-CM

## 2024-08-27 DIAGNOSIS — H40.1132: ICD-10-CM

## 2024-08-27 DIAGNOSIS — H02.203: ICD-10-CM

## 2024-08-27 DIAGNOSIS — H04.123: ICD-10-CM

## 2024-08-27 PROCEDURE — 92202 OPSCPY EXTND ON/MAC DRAW: CPT

## 2024-08-27 PROCEDURE — 92020 GONIOSCOPY: CPT

## 2024-08-27 PROCEDURE — 92133 CPTRZD OPH DX IMG PST SGM ON: CPT

## 2024-08-27 PROCEDURE — 92014 COMPRE OPH EXAM EST PT 1/>: CPT

## 2024-08-27 ASSESSMENT — VISUAL ACUITY
OS_SC: 20/60-2
OD_SC: CF 2FT

## 2024-08-27 ASSESSMENT — TONOMETRY
OS_IOP_MMHG: 20
OD_IOP_MMHG: 17

## 2024-09-24 ENCOUNTER — APPOINTMENT (OUTPATIENT)
Dept: LAB | Age: 89
End: 2024-09-24
Payer: MEDICARE

## 2024-09-24 DIAGNOSIS — E78.5 HYPERLIPIDEMIA, UNSPECIFIED HYPERLIPIDEMIA TYPE: ICD-10-CM

## 2024-09-24 DIAGNOSIS — E78.2 MIXED HYPERLIPIDEMIA: ICD-10-CM

## 2024-09-24 DIAGNOSIS — M81.0 AGE-RELATED OSTEOPOROSIS WITHOUT CURRENT PATHOLOGICAL FRACTURE: ICD-10-CM

## 2024-09-24 DIAGNOSIS — R73.03 PREDIABETES: ICD-10-CM

## 2024-09-24 DIAGNOSIS — E55.9 VITAMIN D DEFICIENCY: ICD-10-CM

## 2024-09-24 LAB
25(OH)D3 SERPL-MCNC: 41.9 NG/ML (ref 30–100)
ALBUMIN SERPL BCG-MCNC: 4.4 G/DL (ref 3.5–5)
ALP SERPL-CCNC: 55 U/L (ref 34–104)
ALT SERPL W P-5'-P-CCNC: 8 U/L (ref 7–52)
ANION GAP SERPL CALCULATED.3IONS-SCNC: 9 MMOL/L (ref 4–13)
AST SERPL W P-5'-P-CCNC: 15 U/L (ref 13–39)
BASOPHILS # BLD AUTO: 0.04 THOUSANDS/ΜL (ref 0–0.1)
BASOPHILS NFR BLD AUTO: 1 % (ref 0–1)
BILIRUB SERPL-MCNC: 0.45 MG/DL (ref 0.2–1)
BUN SERPL-MCNC: 13 MG/DL (ref 5–25)
CALCIUM SERPL-MCNC: 9.3 MG/DL (ref 8.4–10.2)
CHLORIDE SERPL-SCNC: 103 MMOL/L (ref 96–108)
CHOLEST SERPL-MCNC: 263 MG/DL
CO2 SERPL-SCNC: 30 MMOL/L (ref 21–32)
CREAT SERPL-MCNC: 0.69 MG/DL (ref 0.6–1.3)
EOSINOPHIL # BLD AUTO: 0.26 THOUSAND/ΜL (ref 0–0.61)
EOSINOPHIL NFR BLD AUTO: 5 % (ref 0–6)
ERYTHROCYTE [DISTWIDTH] IN BLOOD BY AUTOMATED COUNT: 13.2 % (ref 11.6–15.1)
EST. AVERAGE GLUCOSE BLD GHB EST-MCNC: 134 MG/DL
GFR SERPL CREATININE-BSD FRML MDRD: 77 ML/MIN/1.73SQ M
GLUCOSE P FAST SERPL-MCNC: 92 MG/DL (ref 65–99)
HBA1C MFR BLD: 6.3 %
HCT VFR BLD AUTO: 41.6 % (ref 34.8–46.1)
HDLC SERPL-MCNC: 91 MG/DL
HGB BLD-MCNC: 13.1 G/DL (ref 11.5–15.4)
IMM GRANULOCYTES # BLD AUTO: 0.01 THOUSAND/UL (ref 0–0.2)
IMM GRANULOCYTES NFR BLD AUTO: 0 % (ref 0–2)
LDLC SERPL CALC-MCNC: 153 MG/DL (ref 0–100)
LDLC SERPL DIRECT ASSAY-MCNC: 140 MG/DL (ref 0–100)
LYMPHOCYTES # BLD AUTO: 2.2 THOUSANDS/ΜL (ref 0.6–4.47)
LYMPHOCYTES NFR BLD AUTO: 46 % (ref 14–44)
MCH RBC QN AUTO: 31.9 PG (ref 26.8–34.3)
MCHC RBC AUTO-ENTMCNC: 31.5 G/DL (ref 31.4–37.4)
MCV RBC AUTO: 101 FL (ref 82–98)
MONOCYTES # BLD AUTO: 0.63 THOUSAND/ΜL (ref 0.17–1.22)
MONOCYTES NFR BLD AUTO: 13 % (ref 4–12)
NEUTROPHILS # BLD AUTO: 1.72 THOUSANDS/ΜL (ref 1.85–7.62)
NEUTS SEG NFR BLD AUTO: 35 % (ref 43–75)
NONHDLC SERPL-MCNC: 172 MG/DL
NRBC BLD AUTO-RTO: 0 /100 WBCS
PLATELET # BLD AUTO: 257 THOUSANDS/UL (ref 149–390)
PMV BLD AUTO: 10.7 FL (ref 8.9–12.7)
POTASSIUM SERPL-SCNC: 4.2 MMOL/L (ref 3.5–5.3)
PROT SERPL-MCNC: 7 G/DL (ref 6.4–8.4)
RBC # BLD AUTO: 4.11 MILLION/UL (ref 3.81–5.12)
SODIUM SERPL-SCNC: 142 MMOL/L (ref 135–147)
TRIGL SERPL-MCNC: 97 MG/DL
WBC # BLD AUTO: 4.86 THOUSAND/UL (ref 4.31–10.16)

## 2024-09-24 PROCEDURE — 80053 COMPREHEN METABOLIC PANEL: CPT

## 2024-09-24 PROCEDURE — 80061 LIPID PANEL: CPT

## 2024-09-24 PROCEDURE — 83036 HEMOGLOBIN GLYCOSYLATED A1C: CPT

## 2024-09-24 PROCEDURE — 83721 ASSAY OF BLOOD LIPOPROTEIN: CPT

## 2024-09-24 PROCEDURE — 82306 VITAMIN D 25 HYDROXY: CPT

## 2024-09-24 PROCEDURE — 36415 COLL VENOUS BLD VENIPUNCTURE: CPT

## 2024-09-24 PROCEDURE — 85025 COMPLETE CBC W/AUTO DIFF WBC: CPT

## 2024-09-27 ENCOUNTER — PATIENT MESSAGE (OUTPATIENT)
Dept: INTERNAL MEDICINE CLINIC | Facility: CLINIC | Age: 89
End: 2024-09-27

## 2024-09-30 NOTE — PATIENT COMMUNICATION
Pt is rescheduled for 11/13 and added to [facility]. Opened pts chart to verify we received her lab results.

## 2024-10-14 ENCOUNTER — OFFICE VISIT (OUTPATIENT)
Dept: PODIATRY | Facility: CLINIC | Age: 89
End: 2024-10-14
Payer: MEDICARE

## 2024-10-14 VITALS
SYSTOLIC BLOOD PRESSURE: 140 MMHG | DIASTOLIC BLOOD PRESSURE: 82 MMHG | BODY MASS INDEX: 29.26 KG/M2 | HEIGHT: 62 IN | WEIGHT: 159 LBS | HEART RATE: 67 BPM

## 2024-10-14 DIAGNOSIS — M20.11 HAV (HALLUX ABDUCTO VALGUS), RIGHT: ICD-10-CM

## 2024-10-14 DIAGNOSIS — B35.1 ONYCHOMYCOSIS: Primary | ICD-10-CM

## 2024-10-14 DIAGNOSIS — M20.12 HAV (HALLUX ABDUCTO VALGUS), LEFT: ICD-10-CM

## 2024-10-14 DIAGNOSIS — M20.42 HAMMER TOES, BILATERAL: ICD-10-CM

## 2024-10-14 DIAGNOSIS — M79.674 PAIN IN TOES OF BOTH FEET: ICD-10-CM

## 2024-10-14 DIAGNOSIS — M20.41 HAMMER TOES, BILATERAL: ICD-10-CM

## 2024-10-14 DIAGNOSIS — M79.675 PAIN IN TOES OF BOTH FEET: ICD-10-CM

## 2024-10-14 DIAGNOSIS — I73.9 PERIPHERAL VASCULAR DISEASE, UNSPECIFIED (HCC): ICD-10-CM

## 2024-10-14 PROCEDURE — 11721 DEBRIDE NAIL 6 OR MORE: CPT | Performed by: PODIATRIST

## 2024-10-14 PROCEDURE — 99213 OFFICE O/P EST LOW 20 MIN: CPT | Performed by: PODIATRIST

## 2024-10-14 NOTE — PROGRESS NOTES
Name: Dori Forbes      : 1935      MRN: [id number]  Encounter Provider: Guillermo Ly DPM  Encounter Date: 10/14/2024   Encounter department: [facility] PODIATRY [facility]    Assessment & Plan     1. Onychomycosis  2. Peripheral vascular disease, unspecified (HCC)  3. Hav (hallux abducto valgus), right  4. Hav (hallux abducto valgus), left  5. Hammer toes, bilateral  6. Pain in toes of both feet [M79.674, M79.675]      Nail treatment completed 10 in thickness and in length.    Class findings:    Class B:    Advanced Trophic Changes:   Decrease of hair growth   Nail thickening   Skin discoloration   Thin and shiny skin texture       Class C:  Temperature changes (cold feet)  Edema        Q9- 1 class B and 2 class C findings      I personally discussed with patient at the visit today:     The diagnosis of this encounter  2.   Possible etiologies of the diagnosis  3.   Treatment options including advantages and disadvantages of treatment with potential risks and complications associated with these treatments  4.   Prevention strategies and ways to decrease pain     I answered all of the patients questions.    It appears that the blister has completely resolved at this time.  If she does have worsening issues notify the office she does have a bunion deformity as well as hammertoe contractures which is likely causing some irritation to this area.    Return in about 9 weeks (around 2024).    Subjective     Patient returns today for routine at risk footcare.  She also complains of an area that she had a blister formation on the second toe left foot.  She denies any trauma to the area denies any other new acute changes.  She does relate some discomfort and tenderness to the toes with thickening of the nails.        Constitutional:  Negative for chills and fever.   Respiratory:  Negative for chest tightness and shortness of breath.    Gastrointestinal:  Negative for nausea and  "vomiting.     Current Outpatient Medications on File Prior to Visit   Medication Sig    brimonidine (Alphagan P) 0.1 %     denosumab (PROLIA) 60 mg/mL Inject 60 mg under the skin every 6 (six) months    HALOG 0.1 % CREA     omeprazole (PriLOSEC) 20 mg delayed release capsule Take 1 capsule (20 mg total) by mouth daily    travoprost (TRAVATAN-Z) 0.004 % ophthalmic solution Administer 1 drop to both eyes daily at bedtime       Objective     /82   Pulse 67   Ht 5' 2\" (1.575 m)   Wt 72.1 kg (159 lb)   BMI 29.08 kg/m²     Thickness and dystrophic appearance of the nails x 10.  There is no other areas of open ulcerations or lesions noted currently.  There is some hammertoe contractures noted to the lesser digits.  There is no signs of open ulcerations or lesions no blister formation noted currently.  No other areas of acute discomfort or tenderness on examination.  "

## 2024-10-29 ENCOUNTER — TELEPHONE (OUTPATIENT)
Age: 89
End: 2024-10-29

## 2024-10-29 NOTE — TELEPHONE ENCOUNTER
Pt wants to know if she can get her Prolia at her appt on 11/13. She wants to make sure the office gets clearance from her insurance company. Please, advise and let pt know.

## 2024-11-13 ENCOUNTER — OFFICE VISIT (OUTPATIENT)
Dept: INTERNAL MEDICINE CLINIC | Facility: CLINIC | Age: 89
End: 2024-11-13
Payer: MEDICARE

## 2024-11-13 VITALS
HEART RATE: 66 BPM | OXYGEN SATURATION: 95 % | HEIGHT: 62 IN | DIASTOLIC BLOOD PRESSURE: 100 MMHG | BODY MASS INDEX: 29.26 KG/M2 | WEIGHT: 159 LBS | SYSTOLIC BLOOD PRESSURE: 138 MMHG

## 2024-11-13 DIAGNOSIS — Z00.00 MEDICARE ANNUAL WELLNESS VISIT, SUBSEQUENT: ICD-10-CM

## 2024-11-13 DIAGNOSIS — R73.03 PREDIABETES: ICD-10-CM

## 2024-11-13 DIAGNOSIS — M81.0 AGE-RELATED OSTEOPOROSIS WITHOUT CURRENT PATHOLOGICAL FRACTURE: Primary | ICD-10-CM

## 2024-11-13 DIAGNOSIS — E78.2 MIXED HYPERLIPIDEMIA: ICD-10-CM

## 2024-11-13 DIAGNOSIS — R63.4 UNINTENTIONAL WEIGHT LOSS: ICD-10-CM

## 2024-11-13 DIAGNOSIS — E78.5 HYPERLIPIDEMIA, UNSPECIFIED HYPERLIPIDEMIA TYPE: ICD-10-CM

## 2024-11-13 DIAGNOSIS — R06.02 SOB (SHORTNESS OF BREATH): ICD-10-CM

## 2024-11-13 DIAGNOSIS — M79.10 MUSCLE PAIN: ICD-10-CM

## 2024-11-13 DIAGNOSIS — K44.9 HIATAL HERNIA: ICD-10-CM

## 2024-11-13 PROCEDURE — 99214 OFFICE O/P EST MOD 30 MIN: CPT | Performed by: GENERAL ACUTE CARE HOSPITAL

## 2024-11-13 PROCEDURE — G0439 PPPS, SUBSEQ VISIT: HCPCS | Performed by: GENERAL ACUTE CARE HOSPITAL

## 2024-11-13 PROCEDURE — 96372 THER/PROPH/DIAG INJ SC/IM: CPT

## 2024-11-13 RX ORDER — ROSUVASTATIN CALCIUM 10 MG/1
10 TABLET, COATED ORAL DAILY
Qty: 90 TABLET | Refills: 3 | Status: SHIPPED | OUTPATIENT
Start: 2024-11-13

## 2024-11-13 NOTE — ASSESSMENT & PLAN NOTE
LDL increased after stopping statin  It was initially stopped due to leg cramp but feels not associated. Her leg cramp resolved after starting Mg.   Restart crestor 20mg daily

## 2024-11-13 NOTE — PATIENT INSTRUCTIONS
Please schedule your bone scan  Recommend getting RSV vaccine, also get your 2nd shingles vaccine  Medicare Preventive Visit Patient Instructions  Thank you for completing your Welcome to Medicare Visit or Medicare Annual Wellness Visit today. Your next wellness visit will be due in one year (11/14/2025).  The screening/preventive services that you may require over the next 5-10 years are detailed below. Some tests may not apply to you based off risk factors and/or age. Screening tests ordered at today's visit but not completed yet may show as past due. Also, please note that scanned in results may not display below.  Preventive Screenings:  Service Recommendations Previous Testing/Comments   Colorectal Cancer Screening  * Colonoscopy    * Fecal Occult Blood Test (FOBT)/Fecal Immunochemical Test (FIT)  * Fecal DNA/Cologuard Test  * Flexible Sigmoidoscopy Age: 45-75 years old   Colonoscopy: every 10 years (may be performed more frequently if at higher risk)  OR  FOBT/FIT: every 1 year  OR  Cologuard: every 3 years  OR  Sigmoidoscopy: every 5 years  Screening may be recommended earlier than age 45 if at higher risk for colorectal cancer. Also, an individualized decision between you and your healthcare provider will decide whether screening between the ages of 76-85 would be appropriate. Colonoscopy: 02/16/2016  FOBT/FIT: Not on file  Cologuard: Not on file  Sigmoidoscopy: Not on file    Screening Not Indicated     Breast Cancer Screening Age: 40+ years old  Frequency: every 1-2 years  Not required if history of left and right mastectomy Mammogram: 11/15/2022    Screening Current   Cervical Cancer Screening Between the ages of 21-29, pap smear recommended once every 3 years.   Between the ages of 30-65, can perform pap smear with HPV co-testing every 5 years.   Recommendations may differ for women with a history of total hysterectomy, cervical cancer, or abnormal pap smears in past. Pap Smear: Not on file    Screening  Not Indicated   Hepatitis C Screening Once for adults born between 1945 and 1965  More frequently in patients at high risk for Hepatitis C Hep C Antibody: Not on file        Diabetes Screening 1-2 times per year if you're at risk for diabetes or have pre-diabetes Fasting glucose: 92 mg/dL (9/24/2024)  A1C: 6.3 % (9/24/2024)  Screening Current   Cholesterol Screening Once every 5 years if you don't have a lipid disorder. May order more often based on risk factors. Lipid panel: 09/24/2024    Screening Not Indicated  History Lipid Disorder     Other Preventive Screenings Covered by Medicare:  Abdominal Aortic Aneurysm (AAA) Screening: covered once if your at risk. You're considered to be at risk if you have a family history of AAA.  Lung Cancer Screening: covers low dose CT scan once per year if you meet all of the following conditions: (1) Age 55-77; (2) No signs or symptoms of lung cancer; (3) Current smoker or have quit smoking within the last 15 years; (4) You have a tobacco smoking history of at least 20 pack years (packs per day multiplied by number of years you smoked); (5) You get a written order from a healthcare provider.  Glaucoma Screening: covered annually if you're considered high risk: (1) You have diabetes OR (2) Family history of glaucoma OR (3)  aged 50 and older OR (4)  American aged 65 and older  Osteoporosis Screening: covered every 2 years if you meet one of the following conditions: (1) You're estrogen deficient and at risk for osteoporosis based off medical history and other findings; (2) Have a vertebral abnormality; (3) On glucocorticoid therapy for more than 3 months; (4) Have primary hyperparathyroidism; (5) On osteoporosis medications and need to assess response to drug therapy.   Last bone density test (DXA Scan): 07/02/2019.  HIV Screening: covered annually if you're between the age of 15-65. Also covered annually if you are younger than 15 and older than 65 with  risk factors for HIV infection. For pregnant patients, it is covered up to 3 times per pregnancy.    Immunizations:  Immunization Recommendations   Influenza Vaccine Annual influenza vaccination during flu season is recommended for all persons aged >= 6 months who do not have contraindications   Pneumococcal Vaccine   * Pneumococcal conjugate vaccine = PCV13 (Prevnar 13), PCV15 (Vaxneuvance), PCV20 (Prevnar 20)  * Pneumococcal polysaccharide vaccine = PPSV23 (Pneumovax) Adults 19-65 yo with certain risk factors or if 65+ yo  If never received any pneumonia vaccine: recommend Prevnar 20 (PCV20)  Give PCV20 if previously received 1 dose of PCV13 or PPSV23   Hepatitis B Vaccine 3 dose series if at intermediate or high risk (ex: diabetes, end stage renal disease, liver disease)   Respiratory syncytial virus (RSV) Vaccine - COVERED BY MEDICARE PART D  * RSVPreF3 (Arexvy) CDC recommends that adults 60 years of age and older may receive a single dose of RSV vaccine using shared clinical decision-making (SCDM)   Tetanus (Td) Vaccine - COST NOT COVERED BY MEDICARE PART B Following completion of primary series, a booster dose should be given every 10 years to maintain immunity against tetanus. Td may also be given as tetanus wound prophylaxis.   Tdap Vaccine - COST NOT COVERED BY MEDICARE PART B Recommended at least once for all adults. For pregnant patients, recommended with each pregnancy.   Shingles Vaccine (Shingrix) - COST NOT COVERED BY MEDICARE PART B  2 shot series recommended in those 19 years and older who have or will have weakened immune systems or those 50 years and older     Health Maintenance Due:      Topic Date Due   • Colorectal Cancer Screening  02/16/2019   • Breast Cancer Screening: Mammogram  11/15/2023     Immunizations Due:  There are no preventive care reminders to display for this patient.  Advance Directives   What are advance directives?  Advance directives are legal documents that state your  wishes and plans for medical care. These plans are made ahead of time in case you lose your ability to make decisions for yourself. Advance directives can apply to any medical decision, such as the treatments you want, and if you want to donate organs.   What are the types of advance directives?  There are many types of advance directives, and each state has rules about how to use them. You may choose a combination of any of the following:  Living will:  This is a written record of the treatment you want. You can also choose which treatments you do not want, which to limit, and which to stop at a certain time. This includes surgery, medicine, IV fluid, and tube feedings.   Durable power of  for healthcare (DPAHC):  This is a written record that states who you want to make healthcare choices for you when you are unable to make them for yourself. This person, called a proxy, is usually a family member or a friend. You may choose more than 1 proxy.  Do not resuscitate (DNR) order:  A DNR order is used in case your heart stops beating or you stop breathing. It is a request not to have certain forms of treatment, such as CPR. A DNR order may be included in other types of advance directives.  Medical directive:  This covers the care that you want if you are in a coma, near death, or unable to make decisions for yourself. You can list the treatments you want for each condition. Treatment may include pain medicine, surgery, blood transfusions, dialysis, IV or tube feedings, and a ventilator (breathing machine).  Values history:  This document has questions about your views, beliefs, and how you feel and think about life. This information can help others choose the care that you would choose.  Why are advance directives important?  An advance directive helps you control your care. Although spoken wishes may be used, it is better to have your wishes written down. Spoken wishes can be misunderstood, or not followed.  Treatments may be given even if you do not want them. An advance directive may make it easier for your family to make difficult choices about your care.   Weight Management   Why it is important to manage your weight:  Being overweight increases your risk of health conditions such as heart disease, high blood pressure, type 2 diabetes, and certain types of cancer. It can also increase your risk for osteoarthritis, sleep apnea, and other respiratory problems. Aim for a slow, steady weight loss. Even a small amount of weight loss can lower your risk of health problems.  How to lose weight safely:  A safe and healthy way to lose weight is to eat fewer calories and get regular exercise. You can lose up about 1 pound a week by decreasing the number of calories you eat by 500 calories each day.   Healthy meal plan for weight management:  A healthy meal plan includes a variety of foods, contains fewer calories, and helps you stay healthy. A healthy meal plan includes the following:  Eat whole-grain foods more often.  A healthy meal plan should contain fiber. Fiber is the part of grains, fruits, and vegetables that is not broken down by your body. Whole-grain foods are healthy and provide extra fiber in your diet. Some examples of whole-grain foods are whole-wheat breads and pastas, oatmeal, brown rice, and bulgur.  Eat a variety of vegetables every day.  Include dark, leafy greens such as spinach, kale, rubi greens, and mustard greens. Eat yellow and orange vegetables such as carrots, sweet potatoes, and winter squash.   Eat a variety of fruits every day.  Choose fresh or canned fruit (canned in its own juice or light syrup) instead of juice. Fruit juice has very little or no fiber.  Eat low-fat dairy foods.  Drink fat-free (skim) milk or 1% milk. Eat fat-free yogurt and low-fat cottage cheese. Try low-fat cheeses such as mozzarella and other reduced-fat cheeses.  Choose meat and other protein foods that are low in fat.   Choose beans or other legumes such as split peas or lentils. Choose fish, skinless poultry (chicken or turkey), or lean cuts of red meat (beef or pork). Before you cook meat or poultry, cut off any visible fat.   Use less fat and oil.  Try baking foods instead of frying them. Add less fat, such as margarine, sour cream, regular salad dressing and mayonnaise to foods. Eat fewer high-fat foods. Some examples of high-fat foods include french fries, doughnuts, ice cream, and cakes.  Eat fewer sweets.  Limit foods and drinks that are high in sugar. This includes candy, cookies, regular soda, and sweetened drinks.  Exercise:  Exercise at least 30 minutes per day on most days of the week. Some examples of exercise include walking, biking, dancing, and swimming. You can also fit in more physical activity by taking the stairs instead of the elevator or parking farther away from stores. Ask your healthcare provider about the best exercise plan for you.      © Copyright [employer] 2018 Information is for End User's use only and may not be sold, redistributed or otherwise used for commercial purposes. All illustrations and images included in CareNotes® are the copyrighted property of A.D.A[employer]M., Inc[employer] or [employer]

## 2024-11-13 NOTE — ASSESSMENT & PLAN NOTE
Patient has infrequent episodes of chest discomfort and heavy breathing once every several month  She think it is from her hiatal hernia  It is not exertional.  When she exercises on her treadmill 20 minutes daily she does not get the symptoms.  This suggested that the symptoms are probably not cardiogenic.  Will get chest x-ray and a monitor symptoms

## 2024-11-13 NOTE — ASSESSMENT & PLAN NOTE
Vaccinations:had 2024 covid and flu; she reports she had shingles vaccine completed at local pharmacy (no record); discussed RSV vaccine  Advance care planning:HCP reviewed with pt, wants niece to be HCP, consider stepson to be secondaty, HCP paper printed for pt  Cognition:no concern  DEXA: ordered  Healthy diet and regular exercise

## 2024-11-13 NOTE — ASSESSMENT & PLAN NOTE
173Ibs a year ago  14Ibs wt loss over the past year  Initially wt loss was probably intentional per pt  Continue to monitor

## 2024-11-13 NOTE — PROGRESS NOTES
Ambulatory Visit  Name: Dori Forbes      : 1935      MRN: [id number]  Encounter Provider: Toya Colvin MD  Encounter Date: 2024   Encounter department: [facility]    Assessment & Plan  Age-related osteoporosis without current pathological fracture  On prolia for many years  Repeat DEXA   Give prolia 24         Hyperlipidemia, unspecified hyperlipidemia type    Orders:    rosuvastatin (CRESTOR) 10 MG tablet; Take 1 tablet (10 mg total) by mouth daily    Mixed hyperlipidemia  LDL increased after stopping statin  It was initially stopped due to leg cramp but feels not associated. Her leg cramp resolved after starting Mg.   Restart crestor 20mg daily         Prediabetes  Continue lifestyle modification  A1c stable  monitor         Unintentional weight loss  173Ibs a year ago  14Ibs wt loss over the past year  Initially wt loss was probably intentional per pt  Continue to monitor          Medicare annual wellness visit, subsequent  Vaccinations:had  covid and flu; she reports she had shingles vaccine completed at local pharmacy (no record); discussed RSV vaccine  Advance care planning:HCP reviewed with pt, wants niece to be HCP, consider stepson to be secondaty, HCP paper printed for pt  Cognition:no concern  DEXA: ordered  Healthy diet and regular exercise           Muscle pain  Mild muscle pain waking up in the morning.  Gets better throughout the day.  Monitor for now         Hiatal hernia  Patient has infrequent episodes of chest discomfort and heavy breathing once every several month  She think it is from her hiatal hernia  It is not exertional.  When she exercises on her treadmill 20 minutes daily she does not get the symptoms.  This suggested that the symptoms are probably not cardiogenic.  Will get chest x-ray and a monitor symptoms             SOB (shortness of breath)    Orders:    XR chest pa and lateral; Future       Preventive health issues were discussed  with patient, and age appropriate screening tests were ordered as noted in patient's After Visit Summary. Personalized health advice and appropriate referrals for health education or preventive services given if needed, as noted in patient's After Visit Summary.    History of Present Illness     HPI   Patient Care Team:  Toya Colvin MD as PCP - General (Internal Medicine)    Review of Systems  Medical History Reviewed by provider this encounter:       Annual Wellness Visit Questionnaire   Dori is here for her Subsequent Wellness visit.     Health Risk Assessment:   Patient rates overall health as good. Patient feels that their physical health rating is same. Patient is satisfied with their life. Eyesight was rated as same. Hearing was rated as same. Patient feels that their emotional and mental health rating is same. Patients states they are never, rarely angry. Patient states they are never, rarely unusually tired/fatigued. Pain experienced in the last 7 days has been some. Patient's pain rating has been 1/10. Patient states that she has experienced no weight loss or gain in last 6 months.     Depression Screening:   PHQ-2 Score: 0      Fall Risk Screening:   In the past year, patient has experienced: no history of falling in past year      Urinary Incontinence Screening:   Patient has not leaked urine accidently in the last six months.     Home Safety:  Patient does not have trouble with stairs inside or outside of their home. Patient has working smoke alarms and has working carbon monoxide detector. Home safety hazards include: none.     Nutrition:   Current diet is Regular.     Medications:   Patient is currently taking over-the-counter supplements. OTC medications include: see medication list. Patient is able to manage medications.     Activities of Daily Living (ADLs)/Instrumental Activities of Daily Living (IADLs):   Walk and transfer into and out of bed and chair?: Yes  Dress and groom yourself?: Yes     Bathe or shower yourself?: Yes    Feed yourself? Yes  Do your laundry/housekeeping?: Yes  Manage your money, pay your bills and track your expenses?: Yes  Make your own meals?: Yes    Do your own shopping?: Yes    Previous Hospitalizations:   Any hospitalizations or ED visits within the last 12 months?: No      Advance Care Planning:   Living will: No    Durable POA for healthcare: No    Advanced directive: No      PREVENTIVE SCREENINGS      Cardiovascular Screening:    General: Screening Not Indicated and History Lipid Disorder      Diabetes Screening:     General: Screening Current      Colorectal Cancer Screening:     General: Screening Not Indicated      Breast Cancer Screening:     General: Screening Current      Cervical Cancer Screening:    General: Screening Not Indicated      Osteoporosis Screening:    General: Screening Not Indicated and History Osteoporosis      Lung Cancer Screening:     General: Screening Not Indicated    Screening, Brief Intervention, and Referral to Treatment (SBIRT)    Screening      AUDIT-C Screenin) How often did you have a drink containing alcohol in the past year? monthly or less  2) How many drinks did you have on a typical day when you were drinking in the past year? 1 to 2  3) How often did you have 6 or more drinks on one occasion in the past year? less than monthly    AUDIT-C Score: 2  Interpretation: Score 0-2 (female): Negative screen for alcohol misuse    Social Drivers of Health     Financial Resource Strain: Low Risk  (10/8/2023)    Overall Financial Resource Strain (CARDIA)     Difficulty of Paying Living Expenses: Not very hard   Food Insecurity: No Food Insecurity (2024)    Hunger Vital Sign     Worried About Running Out of Food in the Last Year: Never true     Ran Out of Food in the Last Year: Never true   Transportation Needs: No Transportation Needs (2024)    PRAPARE - Transportation     Lack of Transportation (Medical): No     Lack of  "Transportation (Non-Medical): No   Housing Stability: Low Risk  (11/13/2024)    Housing Stability Vital Sign     Unable to Pay for Housing in the Last Year: No     Number of Times Moved in the Last Year: 1     Homeless in the Last Year: No   Utilities: Not At Risk (11/13/2024)    [facility] Utilities     Threatened with loss of utilities: No     No results found.    Objective     /100 (BP Location: Left arm, Patient Position: Sitting, Cuff Size: Standard)   Pulse 66   Ht 5' 2\" (1.575 m)   Wt 72.1 kg (159 lb)   SpO2 95%   BMI 29.08 kg/m²     Physical Exam    "

## 2024-11-13 NOTE — ASSESSMENT & PLAN NOTE
Mild muscle pain waking up in the morning.  Gets better throughout the day.  Monitor for now

## 2024-12-13 PROBLEM — Z00.00 MEDICARE ANNUAL WELLNESS VISIT, SUBSEQUENT: Status: RESOLVED | Noted: 2024-11-13 | Resolved: 2024-12-13

## 2024-12-16 ENCOUNTER — OFFICE VISIT (OUTPATIENT)
Dept: PODIATRY | Facility: CLINIC | Age: 89
End: 2024-12-16

## 2024-12-16 VITALS
HEART RATE: 68 BPM | DIASTOLIC BLOOD PRESSURE: 75 MMHG | BODY MASS INDEX: 28.52 KG/M2 | SYSTOLIC BLOOD PRESSURE: 158 MMHG | HEIGHT: 62 IN | WEIGHT: 155 LBS

## 2024-12-16 DIAGNOSIS — I73.9 PERIPHERAL VASCULAR DISEASE, UNSPECIFIED (HCC): ICD-10-CM

## 2024-12-16 DIAGNOSIS — B35.1 ONYCHOMYCOSIS: Primary | ICD-10-CM

## 2024-12-16 NOTE — PROGRESS NOTES
"  Name: Dori Forbes      : 1935      MRN: [id number]  Encounter Provider: Guillermo Ly DPM  Encounter Date: 2024   Encounter department: [facility]    Assessment & Plan     1. Onychomycosis  2. Peripheral vascular disease, unspecified (HCC)      Nail treatment completed 10 in thickness and in length.     Class findings:     Class B:     Advanced Trophic Changes:               Decrease of hair growth               Nail thickening               Skin discoloration               Thin and shiny skin texture                    Class C:  Temperature changes (cold feet)  Edema           Q9- 1 class B and 2 class C findings    Return in about 2 months (around 2025).    Subjective     Patient returns for routine footcare.  Denies any pain or discomfort currently.        Constitutional:  Negative for chills and fever.   Respiratory:  Negative for chest tightness and shortness of breath.    Gastrointestinal:  Negative for nausea and vomiting.     Current Outpatient Medications on File Prior to Visit   Medication Sig   • brimonidine (Alphagan P) 0.1 %    • denosumab (PROLIA) 60 mg/mL Inject 60 mg under the skin every 6 (six) months   • HALOG 0.1 % CREA    • omeprazole (PriLOSEC) 20 mg delayed release capsule Take 1 capsule (20 mg total) by mouth daily   • rosuvastatin (CRESTOR) 10 MG tablet Take 1 tablet (10 mg total) by mouth daily   • travoprost (TRAVATAN-Z) 0.004 % ophthalmic solution Administer 1 drop to both eyes daily at bedtime       Objective     /75 (BP Location: Right arm, Patient Position: Sitting, Cuff Size: Adult)   Pulse 68   Ht 5' 2\" (1.575 m) Comment: stated  Wt 70.3 kg (155 lb) Comment: stated  BMI 28.35 kg/m²     Nails 1 through 10 are elongated dystrophic subungual debris.  No signs of infection noted to the skin.  Intact pedal pulses noted currently.  There is some decreased hair growth noted distally as well as nail thickening.      "

## 2025-02-06 ENCOUNTER — OFFICE VISIT (OUTPATIENT)
Dept: OBGYN CLINIC | Facility: CLINIC | Age: OVER 89
End: 2025-02-06
Payer: MEDICARE

## 2025-02-06 ENCOUNTER — APPOINTMENT (OUTPATIENT)
Dept: RADIOLOGY | Facility: AMBULARY SURGERY CENTER | Age: OVER 89
End: 2025-02-06
Attending: STUDENT IN AN ORGANIZED HEALTH CARE EDUCATION/TRAINING PROGRAM
Payer: MEDICARE

## 2025-02-06 VITALS — WEIGHT: 155 LBS | BODY MASS INDEX: 28.52 KG/M2 | HEIGHT: 62 IN

## 2025-02-06 DIAGNOSIS — M79.642 PAIN OF LEFT HAND: ICD-10-CM

## 2025-02-06 DIAGNOSIS — M18.12 ARTHRITIS OF CARPOMETACARPAL (CMC) JOINT OF LEFT THUMB: Primary | ICD-10-CM

## 2025-02-06 PROCEDURE — 73130 X-RAY EXAM OF HAND: CPT

## 2025-02-06 PROCEDURE — 20600 DRAIN/INJ JOINT/BURSA W/O US: CPT | Performed by: PHYSICIAN ASSISTANT

## 2025-02-06 PROCEDURE — 99214 OFFICE O/P EST MOD 30 MIN: CPT | Performed by: STUDENT IN AN ORGANIZED HEALTH CARE EDUCATION/TRAINING PROGRAM

## 2025-02-06 RX ORDER — BETAMETHASONE SODIUM PHOSPHATE AND BETAMETHASONE ACETATE 3; 3 MG/ML; MG/ML
3 INJECTION, SUSPENSION INTRA-ARTICULAR; INTRALESIONAL; INTRAMUSCULAR; SOFT TISSUE
Status: COMPLETED | OUTPATIENT
Start: 2025-02-06 | End: 2025-02-06

## 2025-02-06 RX ORDER — LIDOCAINE HYDROCHLORIDE 10 MG/ML
0.5 INJECTION, SOLUTION INFILTRATION; PERINEURAL
Status: COMPLETED | OUTPATIENT
Start: 2025-02-06 | End: 2025-02-06

## 2025-02-06 RX ADMIN — BETAMETHASONE SODIUM PHOSPHATE AND BETAMETHASONE ACETATE 3 MG: 3; 3 INJECTION, SUSPENSION INTRA-ARTICULAR; INTRALESIONAL; INTRAMUSCULAR; SOFT TISSUE at 13:30

## 2025-02-06 RX ADMIN — LIDOCAINE HYDROCHLORIDE 0.5 ML: 10 INJECTION, SOLUTION INFILTRATION; PERINEURAL at 13:30

## 2025-02-06 NOTE — PROGRESS NOTES
ORTHOPAEDIC HAND, WRIST, AND ELBOW OFFICE  VISIT      ASSESSMENT/PLAN:      Diagnoses and all orders for this visit:    Arthritis of carpometacarpal (CMC) joint of left thumb  -     XR hand 3+ vw left; Future  -     Small joint arthrocentesis          89 y.o. female with Left thumb CMC OA with a small ganglion cyst.  Anatomy of the condition/s as well as treatment options and expected outcomes were discussed.  Any pertinent imaging or lab results were reviewed with the patient.   The patient verbalized understanding of exam findings and treatment plan.   The patient was given the opportunity to ask questions.  Questions were answered to the patient's satisfaction.  The patient decided to move forward with cortisone injection today.      Follow Up:  PRN       To Do Next Visit:         Discussions:  Thumb CMC Arthritis: The anatomy and physiology of carpometacarpal joint arthritis was discussed with the patient today in the office.  Deterioration of the articular cartilage eventually leads to hypermobility at the thumb CMC joint, resulting in joint subluxation, osteophyte formation, cystic changes within the trapezium and base of the first metacarpal, as well as subchondral sclerosis.  Eventually, pain, limited mobility, and compensatory hyperextension at the metacarpophalangeal joint may develop.  While normal activity and usage of the thumb joint may provide a painful experience to the patient, this typically does not result in damage to the thumb or hand.  Treatment options include resting thumb spica splints to decreased joint edema, pain, and inflammation.  Therapy exercises to strengthen the thenar musculature may relieve pain, but do not alter the overall continued development of osteoarthritis.  Oral medications, topical medications, corticosteroid injections may decrease pain and increase overall function.  Eventually, approximately 5% of patients may require surgical intervention.       Jose Magallanes,  MD  Attending, Orthopaedic Surgery  Hand, Wrist, and Elbow Surgery  [facility]    ______________________________________________________________________________________________    CHIEF COMPLAINT:  Chief Complaint   Patient presents with    Left Wrist - Pain       SUBJECTIVE:  Patient is a 89 y.o. RHD female who presents today for evaluation and treatment of left thumb CMC OA and cyst.  This has been increased in size for the last 2-3 months.  Pt states she has had it aspirated in the past. No fevers or chills.      Occupation: ret     I have personally reviewed all the relevant PMH, PSH, SH, FH, Medications and allergies      PAST MEDICAL HISTORY:  Past Medical History:   Diagnosis Date    Arthritis     Bunion     GERD (gastroesophageal reflux disease)     HL (hearing loss)     Hypertension     Memory loss     Osteoporosis        PAST SURGICAL HISTORY:  Past Surgical History:   Procedure Laterality Date    BUNIONECTOMY      EYE SURGERY      TONSILLECTOMY         FAMILY HISTORY:  Family History   Problem Relation Age of Onset    Asthma Mother     Stroke Father     Hypertension Father                SOCIAL HISTORY:  Social History     Tobacco Use    Smoking status: Former     Current packs/day: 0.00     Average packs/day: 0.5 packs/day for 30.0 years (15.0 ttl pk-yrs)     Types: Cigarettes     Start date: 1960     Quit date:      Years since quittin.1    Smokeless tobacco: Former    Tobacco comments:     Patient reports she smoked for 20 years   Vaping Use    Vaping status: Never Used   Substance Use Topics    Alcohol use: Yes     Alcohol/week: 2.0 standard drinks of alcohol     Types: 1 Glasses of wine, 1 Standard drinks or equivalent per week     Comment: glass of wine a day    Drug use: No       MEDICATIONS:    Current Outpatient Medications:     brimonidine (Alphagan P) 0.1 %, , Disp: , Rfl:     denosumab (PROLIA) 60 mg/mL, Inject 60 mg under the skin every 6 (six)  months, Disp: , Rfl:     HALOG 0.1 % CREA, , Disp: , Rfl:     omeprazole (PriLOSEC) 20 mg delayed release capsule, Take 1 capsule (20 mg total) by mouth daily, Disp: 90 capsule, Rfl: 1    rosuvastatin (CRESTOR) 10 MG tablet, Take 1 tablet (10 mg total) by mouth daily, Disp: 90 tablet, Rfl: 3    travoprost (TRAVATAN-Z) 0.004 % ophthalmic solution, Administer 1 drop to both eyes daily at bedtime, Disp: , Rfl:     ALLERGIES:  No Known Allergies        REVIEW OF SYSTEMS:  Musculoskeletal:        As noted in HPI.   All other systems reviewed and are negative.    VITALS:  There were no vitals filed for this visit.    LABS:  HgA1c:   Lab Results   Component Value Date    HGBA1C 6.3 (H) 09/24/2024     BMP:   Lab Results   Component Value Date    CALCIUM 9.3 09/24/2024    K 4.2 09/24/2024    CO2 30 09/24/2024     09/24/2024    BUN 13 09/24/2024    CREATININE 0.69 09/24/2024       _____________________________________________________  PHYSICAL EXAMINATION:  General: Well developed and well nourished, alert & oriented x 3, appears comfortable  Psychiatric: Normal  HEENT: Normocephalic, Atraumatic Trachea Midline, No torticollis  Pulmonary: No audible wheezing or respiratory distress   Abdomen/GI: Non tender, non distended   Cardiovascular: No pitting edema, 2+ radial pulse   Skin: No masses, erythema, lacerations, fluctation, ulcerations  Neurovascular: Sensation Intact to the Median, Ulnar, Radial Nerve, Motor Intact to the Median, Ulnar, Radial Nerve, and Pulses Intact  Musculoskeletal: Normal, except as noted in detailed exam and in HPI.      MUSCULOSKELETAL EXAMINATION:    Left wrist:    positive shoulder sign.  Positive localized tenderness to palpation of the thumb CMC joint.  Nontender to thumb A1 pulley and 1st dorsal compartment.  Positive thumb circumduction test.  No triggering.  Negative Finkelstein's.       Positive very small cyst like structure on the left CMC.  Skin is not translucent.  There is not skin  thinning.  Skin is intact.  No signs of infection.        ___________________________________________________  STUDIES REVIEWED:  Xrays of the Left hand were reviewed and independently interpreted in PACS by Dr. Magallanes and demonstrate CMC joint OA          PROCEDURES PERFORMED:  Small joint arthrocentesis  Universal Protocol:  procedure performed by consultantConsent: Verbal consent obtained.  Risks and benefits: risks, benefits and alternatives were discussed  Consent given by: patient  Timeout called at: 2/6/2025 1:44 PM.  Patient understanding: patient states understanding of the procedure being performed  Site marked: the operative site was marked  Patient identity confirmed: verbally with patient  Supporting Documentation  Indications: pain   Procedure Details  Location: thumb -   Needle size: 25 G  Ultrasound guidance: no  Approach: radial  Medications administered: 3 mg betamethasone acetate-betamethasone sodium phosphate 6 (3-3) mg/mL; 0.5 mL lidocaine 1 %    Aspirate amount: 0 mL  Patient tolerance: patient tolerated the procedure well with no immediate complications  Dressing:  Sterile dressing applied             _____________________________________________________      Scribe Attestation      I,:   am acting as a scribe while in the presence of the attending physician.:       I,:   personally performed the services described in this documentation    as scribed in my presence.:

## 2025-02-14 ENCOUNTER — HOSPITAL ENCOUNTER (OUTPATIENT)
Dept: RADIOLOGY | Facility: HOSPITAL | Age: OVER 89
Discharge: HOME | End: 2025-02-14
Attending: ORTHOPAEDIC SURGERY
Payer: MEDICARE

## 2025-02-14 ENCOUNTER — OFFICE VISIT (OUTPATIENT)
Dept: OBGYN CLINIC | Facility: HOSPITAL | Age: OVER 89
End: 2025-02-14
Payer: MEDICARE

## 2025-02-14 VITALS — HEIGHT: 62 IN | WEIGHT: 154.98 LBS | BODY MASS INDEX: 28.52 KG/M2

## 2025-02-14 DIAGNOSIS — R52 PAIN: Primary | ICD-10-CM

## 2025-02-14 DIAGNOSIS — R52 PAIN: ICD-10-CM

## 2025-02-14 DIAGNOSIS — M43.16 SPONDYLOLISTHESIS AT L4-L5 LEVEL: ICD-10-CM

## 2025-02-14 DIAGNOSIS — M54.16 LUMBAR RADICULOPATHY: ICD-10-CM

## 2025-02-14 DIAGNOSIS — M47.816 LUMBAR SPONDYLOSIS: ICD-10-CM

## 2025-02-14 PROCEDURE — 99214 OFFICE O/P EST MOD 30 MIN: CPT | Performed by: ORTHOPAEDIC SURGERY

## 2025-02-14 PROCEDURE — 72110 X-RAY EXAM L-2 SPINE 4/>VWS: CPT

## 2025-02-14 RX ORDER — MELOXICAM 7.5 MG/1
7.5 TABLET ORAL DAILY
Qty: 14 TABLET | Refills: 0 | Status: SHIPPED | OUTPATIENT
Start: 2025-02-14

## 2025-02-14 NOTE — PROGRESS NOTES
"Assessment & Plan/Medical Decision Makin y.o. female with Back Pain and imaging findings most notable for Lumbar Spondylosis, L4-5 degenerative spondylolisthesis        The clinical, physical and imaging findings were reviewed with the patient.  Dori  has a constellation of findings consistent with lumbar degenerative disc disease.      Physical exam showing mild TTP right SI joint region. No madelyn weakness. Fortunately patient remains neurologically intact and functional. We discussed the treatment options including physical therapy, at home exercises, activity modifications, chiropractic medicine, oral medications, interventional spine procedures.  At this time recommend continued conservative treatments. Patient does not wish to pursue any surgical intervention at this time.    Would not recommend any treatment over the next month to see if the pain subsides given it has only been ongoing for about 1 week.  Can consider obtaining RLE EMG to eval for peroneal neuropathy vs lumbar radiculopathy if symptoms persist.  Meloxicam rx sent to patient's pharmacy. Discussed reasoning for prescribing medication, proper usage, and potential side effects.    Patient instructed to return to office/ER sooner if symptoms are not improving, getting worse, or new worrisome/neurologic symptoms arise.  Patient will follow up as needed.     Subjective:      Chief Complaint: Back Pain    HPI:  Dori Forbes is a 89 y.o. female presenting for initial visit with chief complaint of back pain. Onset of \"burning\" pain in her right calf and right hip soreness while at [employer] 2-3 months ago. She had some chiropractic care with relief. However, had return of pain about 1 week ago. Currently reports ongoing right gluteal region \"soreness\" with \"burning\" pain in her lateral right calf to her ankle worse in the morning. Subjective weakness at times in the morning, otherwise denies madelyn lower extremity weakness. Pain worse " "with prolonged standing, sitting and when lying in bed. Also worse when going from a seated to standing position. Improved with walking and increased activity. Ambulates on a treadmill without much issue. Notes \"burning\" pain in lateral right calf is most bothersome. Denies pain or symptoms in her thigh. Denies left sided symptoms. Denies numbness or tingling. Denies lower extremity weakness. Denies significant low back pain currently. Has recently adjusted the back support on her chair with some benefit. Denies any madelyn trauma. Denies fever or chills, no night sweats. Denies any bladder or bowel changes.      Conservative therapy includes the following:   Medications: aleve - no relief    Injections: none     Physical Therapy: none, has attempted at home exericses  Chiropractic Medicine: yes, has attempted  Accupunture/Massage Therapy: has not attempted   These therapeutic modalities were ineffective at providing sustained pain relief/functional improvement.     Living situation: Lives with family   ADLs: patient is able to perform     Objective:     Family History   Problem Relation Age of Onset    Asthma Mother     Stroke Father     Hypertension Father                Past Medical History:   Diagnosis Date    Arthritis     Bunion     GERD (gastroesophageal reflux disease)     HL (hearing loss)     Hypertension     Memory loss     Osteoporosis        Current Outpatient Medications   Medication Sig Dispense Refill    brimonidine (Alphagan P) 0.1 %       denosumab (PROLIA) 60 mg/mL Inject 60 mg under the skin every 6 (six) months      HALOG 0.1 % CREA       meloxicam (Mobic) 7.5 mg tablet Take 1 tablet (7.5 mg total) by mouth daily Please take one tablet daily in the morning with food. Please do not take any other anti-inflammatory medications such as Aleve (Naproxen), Motrin (Ibuprofen), or Advil (Ibuprofen) while taking meloxicam. 14 tablet 0    omeprazole (PriLOSEC) 20 mg delayed release capsule Take 1 " capsule (20 mg total) by mouth daily 90 capsule 1    rosuvastatin (CRESTOR) 10 MG tablet Take 1 tablet (10 mg total) by mouth daily 90 tablet 3    travoprost (TRAVATAN-Z) 0.004 % ophthalmic solution Administer 1 drop to both eyes daily at bedtime       No current facility-administered medications for this visit.       Past Surgical History:   Procedure Laterality Date    BUNIONECTOMY      EYE SURGERY      TONSILLECTOMY         Social History     Socioeconomic History    Marital status:      Spouse name: Not on file    Number of children: Not on file    Years of education: Not on file    Highest education level: Not on file   Occupational History    Not on file   Tobacco Use    Smoking status: Former     Current packs/day: 0.00     Average packs/day: 0.5 packs/day for 30.0 years (15.0 ttl pk-yrs)     Types: Cigarettes     Start date: 1960     Quit date:      Years since quittin.1    Smokeless tobacco: Former    Tobacco comments:     Patient reports she smoked for 20 years   Vaping Use    Vaping status: Never Used   Substance and Sexual Activity    Alcohol use: Yes     Alcohol/week: 2.0 standard drinks of alcohol     Types: 1 Glasses of wine, 1 Standard drinks or equivalent per week     Comment: glass of wine a day    Drug use: No    Sexual activity: Not Currently   Other Topics Concern    Not on file   Social History Narrative    Not on file     Social Drivers of Health     Financial Resource Strain: Low Risk  (10/8/2023)    Overall Financial Resource Strain (CARDIA)     Difficulty of Paying Living Expenses: Not very hard   Food Insecurity: No Food Insecurity (2024)    Hunger Vital Sign     Worried About Running Out of Food in the Last Year: Never true     Ran Out of Food in the Last Year: Never true   Transportation Needs: No Transportation Needs (2024)    PRAPARE - Transportation     Lack of Transportation (Medical): No     Lack of Transportation (Non-Medical): No   Physical  "Activity: Not on file   Stress: Not on file   Social Connections: Not on file   Intimate Partner Violence: Not on file   Housing Stability: Low Risk  (11/13/2024)    Housing Stability Vital Sign     Unable to Pay for Housing in the Last Year: No     Number of Times Moved in the Last Year: 1     Homeless in the Last Year: No       No Known Allergies    Review of Systems  General- denies fever/chills  HEENT- denies hearing loss or sore throat  Eyes- denies eye pain or visual disturbances, denies red eyes  Respiratory- denies cough or SOB  Cardio- denies chest pain or palpitations  GI- denies abdominal pain  Endocrine- denies urinary frequency  Urinary- denies pain with urination  Musculoskeletal- Negative except noted above  Skin- denies rashes or wounds  Neurological- denies dizziness or headache  Psychiatric- denies anxiety or difficulty concentrating    Physical Exam  Ht 5' 2\" (1.575 m)   Wt 70.3 kg (154 lb 15.7 oz)   BMI 28.35 kg/m²     General/Constitutional: No apparent distress: well-nourished and well developed.  Lymphatic: No appreciable lymphadenopathy  Respiratory: Non-labored breathing  Vascular: No edema, swelling or tenderness, except as noted in detailed exam.  Integumentary: No impressive skin lesions present, except as noted in detailed exam.  Psych: Normal mood and affect, oriented to person, place and time.  MSK: normal other than stated in HPI and exam  Gait & balance: no evidence of myelopathic gait, ambulates Independently     Lumbar spine range of motion:  -Forward flexion to 80  -Extension to neutral  There is mild tenderness with palpation along lumbar paraspinal musculature, no midline tenderness     Neurologic:  Lower Extremity Motor Function    Right  Left    Iliopsoas  5/5  5/5    Quadriceps 5/5 5/5   Tibialis anterior  5/5  5/5    EHL  5/5  5/5    Gastroc. muscle  5/5  5/5    Heel rise  5/5  5/5    Toe rise  5/5  5/5      Sensory: light touch is intact to bilateral lower extremities " "    Reflexes:  Intact, diminished =    Other tests:  Straight Leg Raise: negative  Luis SI: positive  AMY SI: negative  Greater troch: no tenderness   Internal/external hip ROM: intact, no pain   Flexion/extension knee ROM: intact, no pain   Vascular: WWP extremities    Diagnostic Tests   IMAGING: I have personally reviewed the images and these are my findings:  Lumbar Spine X-rays from 2/14/2025: multi level lumbar spondylosis with loss of disc height, osteophyte formation and facet hypertrophy, L4-5 degenerative spondylolisthesis, no appreciated lytic/blastic lesions, no obvious instability    Electronic Medical Records were reviewed including PCP notes, office notes, radiology reports, imaging studies    Procedures, if performed today     None performed       Portions of the record may have been created with voice recognition software.  Occasional wrong word or \"sound a like\" substitutions may have occurred due to the inherent limitations of voice recognition software.  Read the chart carefully and recognize, using context, where substitutions have occurred.  "

## 2025-02-20 ENCOUNTER — IOP CHECK (OUTPATIENT)
Dept: URBAN - METROPOLITAN AREA CLINIC 6 | Facility: CLINIC | Age: OVER 89
End: 2025-02-20

## 2025-02-20 DIAGNOSIS — H02.203: ICD-10-CM

## 2025-02-20 DIAGNOSIS — H04.123: ICD-10-CM

## 2025-02-20 DIAGNOSIS — Z96.1: ICD-10-CM

## 2025-02-20 DIAGNOSIS — H40.1132: ICD-10-CM

## 2025-02-20 DIAGNOSIS — H02.206: ICD-10-CM

## 2025-02-20 PROCEDURE — 92083 EXTENDED VISUAL FIELD XM: CPT

## 2025-02-20 PROCEDURE — 92012 INTRM OPH EXAM EST PATIENT: CPT

## 2025-02-20 PROCEDURE — 92015 DETERMINE REFRACTIVE STATE: CPT

## 2025-02-20 ASSESSMENT — TONOMETRY
OS_IOP_MMHG: 16
OD_IOP_MMHG: 12

## 2025-02-20 ASSESSMENT — VISUAL ACUITY
OS_SC: 20/60-2
OD_SC: CF 1FT
OS_PH: 20/50-2

## 2025-02-24 ENCOUNTER — TELEPHONE (OUTPATIENT)
Age: OVER 89
End: 2025-02-24

## 2025-02-24 NOTE — TELEPHONE ENCOUNTER
Pt called in stating that she take 1,000 mg of Vit d3 daily. Pt stating that she read that vit d can help with sciatica and psoriasis. Pt is asking if she should increase her Vit D to see if it could help those two condition for her. Please advise.

## 2025-02-25 NOTE — TELEPHONE ENCOUNTER
She could increase to try if it helps with her symptoms. Usually no more than 4000IU should be safe. If no improvement after a months or two, she could return to 1000IU daily.   Her vit D blood level last checked was within normal range.

## 2025-02-26 DIAGNOSIS — K21.9 GERD WITHOUT ESOPHAGITIS: ICD-10-CM

## 2025-02-27 ENCOUNTER — TELEPHONE (OUTPATIENT)
Age: OVER 89
End: 2025-02-27

## 2025-02-27 RX ORDER — OMEPRAZOLE 20 MG/1
20 CAPSULE, DELAYED RELEASE ORAL DAILY
Qty: 90 CAPSULE | Refills: 1 | Status: SHIPPED | OUTPATIENT
Start: 2025-02-27

## 2025-02-27 NOTE — TELEPHONE ENCOUNTER
Caller: Dori Forbes    Doctor: Dr. Ly    Reason for call: appt/checked to be sure she is to be seen for nails and she was seen for this before, so I scheduled as she needs [place] office only and cannot do Tuesdays. She was seeing Dr. Macedo, but then changed to Dr. Ly as she cannot get to [facility] anymore.    Call back#: NA

## 2025-04-07 ENCOUNTER — TELEPHONE (OUTPATIENT)
Age: OVER 89
End: 2025-04-07

## 2025-04-07 DIAGNOSIS — R73.09 ELEVATED GLUCOSE: ICD-10-CM

## 2025-04-07 DIAGNOSIS — E78.2 MIXED HYPERLIPIDEMIA: Primary | ICD-10-CM

## 2025-04-07 DIAGNOSIS — E55.9 VITAMIN D DEFICIENCY: ICD-10-CM

## 2025-04-07 NOTE — TELEPHONE ENCOUNTER
The patient has an appointment on 5/7.  She is asking if she needs any blood work prior to her appointment.      She will be at an appointment near the lab today.    Please call her if these can be put in today.

## 2025-04-15 ENCOUNTER — RESULTS FOLLOW-UP (OUTPATIENT)
Dept: INTERNAL MEDICINE CLINIC | Facility: CLINIC | Age: OVER 89
End: 2025-04-15

## 2025-04-15 ENCOUNTER — APPOINTMENT (OUTPATIENT)
Dept: LAB | Age: OVER 89
End: 2025-04-15
Attending: GENERAL ACUTE CARE HOSPITAL
Payer: MEDICARE

## 2025-04-15 ENCOUNTER — APPOINTMENT (OUTPATIENT)
Dept: RADIOLOGY | Age: OVER 89
End: 2025-04-15
Payer: MEDICARE

## 2025-04-15 DIAGNOSIS — R06.02 SOB (SHORTNESS OF BREATH): ICD-10-CM

## 2025-04-15 DIAGNOSIS — R73.09 ELEVATED GLUCOSE: ICD-10-CM

## 2025-04-15 DIAGNOSIS — E78.2 MIXED HYPERLIPIDEMIA: ICD-10-CM

## 2025-04-15 DIAGNOSIS — E55.9 VITAMIN D DEFICIENCY: ICD-10-CM

## 2025-04-15 LAB
25(OH)D3 SERPL-MCNC: 48.6 NG/ML (ref 30–100)
ALBUMIN SERPL BCG-MCNC: 4.6 G/DL (ref 3.5–5)
ALP SERPL-CCNC: 63 U/L (ref 34–104)
ALT SERPL W P-5'-P-CCNC: 15 U/L (ref 7–52)
ANION GAP SERPL CALCULATED.3IONS-SCNC: 11 MMOL/L (ref 4–13)
AST SERPL W P-5'-P-CCNC: 22 U/L (ref 13–39)
BILIRUB SERPL-MCNC: 0.65 MG/DL (ref 0.2–1)
BUN SERPL-MCNC: 17 MG/DL (ref 5–25)
CALCIUM SERPL-MCNC: 9.4 MG/DL (ref 8.4–10.2)
CHLORIDE SERPL-SCNC: 101 MMOL/L (ref 96–108)
CHOLEST SERPL-MCNC: 217 MG/DL (ref ?–200)
CO2 SERPL-SCNC: 27 MMOL/L (ref 21–32)
CREAT SERPL-MCNC: 0.69 MG/DL (ref 0.6–1.3)
EST. AVERAGE GLUCOSE BLD GHB EST-MCNC: 140 MG/DL
GFR SERPL CREATININE-BSD FRML MDRD: 77 ML/MIN/1.73SQ M
GLUCOSE P FAST SERPL-MCNC: 104 MG/DL (ref 65–99)
HBA1C MFR BLD: 6.5 %
HDLC SERPL-MCNC: 108 MG/DL
LDLC SERPL CALC-MCNC: 88 MG/DL (ref 0–100)
LDLC SERPL DIRECT ASSAY-MCNC: 83 MG/DL (ref 0–100)
NONHDLC SERPL-MCNC: 109 MG/DL
POTASSIUM SERPL-SCNC: 4.2 MMOL/L (ref 3.5–5.3)
PROT SERPL-MCNC: 7.1 G/DL (ref 6.4–8.4)
SODIUM SERPL-SCNC: 139 MMOL/L (ref 135–147)
TRIGL SERPL-MCNC: 104 MG/DL (ref ?–150)

## 2025-04-15 PROCEDURE — 83036 HEMOGLOBIN GLYCOSYLATED A1C: CPT

## 2025-04-15 PROCEDURE — 80053 COMPREHEN METABOLIC PANEL: CPT

## 2025-04-15 PROCEDURE — 80061 LIPID PANEL: CPT

## 2025-04-15 PROCEDURE — 83721 ASSAY OF BLOOD LIPOPROTEIN: CPT

## 2025-04-15 PROCEDURE — 36415 COLL VENOUS BLD VENIPUNCTURE: CPT

## 2025-04-15 PROCEDURE — 82306 VITAMIN D 25 HYDROXY: CPT

## 2025-04-15 PROCEDURE — 71046 X-RAY EXAM CHEST 2 VIEWS: CPT

## 2025-04-29 ENCOUNTER — RA CDI HCC (OUTPATIENT)
Dept: OTHER | Facility: HOSPITAL | Age: OVER 89
End: 2025-04-29

## 2025-05-06 NOTE — ASSESSMENT & PLAN NOTE
Vladue prolia  Gave dose today 5//72025  Started prolia in 10/2023  Will repeat DEXA after next dose  Orders:    denosumab (PROLIA) subcutaneous injection 60 mg

## 2025-05-06 NOTE — PROGRESS NOTES
"Name: Dori Forbes      : 1935      MRN: [id number]  Encounter Provider: Toya Colvin MD  Encounter Date: 2025   Encounter department: [facility]  :  Assessment & Plan  Age-related osteoporosis without current pathological fracture  Contniue prolia  Gave dose today   Started prolia in 10/2023  Will repeat DEXA after next dose  Orders:    denosumab (PROLIA) subcutaneous injection 60 mg    Mixed hyperlipidemia  This is under control.  Continue rosuvastatin.       Unintentional weight loss  Weight is relatively stable since last visit.  Continue to monitor.       Hyperlipidemia, unspecified hyperlipidemia type    Orders:    rosuvastatin (CRESTOR) 10 MG tablet; Take 1 tablet (10 mg total) by mouth daily    GERD without esophagitis    Orders:    omeprazole (PriLOSEC) 20 mg delayed release capsule; Take 1 capsule (20 mg total) by mouth daily    Leg cramp  Magnesium helps with symptom relief.  Continue.  Orders:    magnesium oxide (MAG-OX) 400 mg tablet; Take 1 tablet (400 mg total) by mouth daily    Prediabetes  Recent A1c within diabetic range.  She reports eating many sweets recently.  She is interested in getting a nutritional consultation.  Referral entered.  Will repeat labs.  Orders:    Ambulatory Referral to Nutrition Services; Future    SOB (shortness of breath)  She continues to have episodic shortness of breath lightheadedness tremor and chest discomfort.  About 4-5 episodes since last visit.  This was reported in last visit in 2024.  In last visit it was not exertional.  She was able to exercise on her treadmill for 20 minutes without having any symptoms.  She has not been able to exercise over the past 3 months due to sciatica.  However she notices symptoms have recently been more exertional.  Lying down also worsens shortness of breath.  Weight stable no leg edema.chest xray showed \"Very prominent central pulmonary arteries which may indicate a degree of " "pulmonary hypertension. \".  I will obtain an echo and refer to cardiology for further evaluation.  Chest CT could be considered to evaluate her hiatal hernia.    Orders:    Echo complete w/ contrast if indicated; Future    Ambulatory Referral to Cardiology; Future    B-Type Natriuretic Peptide(BNP); Future    Muscle pain  More muscle pain and stiffness since last visit. Upper back thigh and calf, shoulder girdle.   Denies weakness   We will obtain inflammation markers and muscle markers to rule out PMR myositis.  She is on rosuvastatin.  Statin related myopathy is a possibility.  I recommend to get labs first.      Orders:    CK; Future    Aldolase; Future    C-reactive protein; Future    Sedimentation rate, automated; Future           History of Present Illness   HPI  Continues to get SOB/chest for about 2 years now. discomfort/lightheadeness/tremor. Episodic. Has had 4-5 episodes over the past 6 month. Last for several hours. No trigger.   Exertion worsened the symptoms.   Lying down worsens these symptoms.   Wt stable since last visit.   No leg swelling    Stopped exercising due to sciatica 3 months ago.   Review of Systems    Objective   /80 (BP Location: Left arm, Patient Position: Sitting, Cuff Size: Large)   Pulse 72   Temp (!) 96.8 °F (36 °C) (Tympanic)   Ht 5' 2\" (1.575 m)   Wt 71.6 kg (157 lb 12.8 oz)   SpO2 95%   BMI 28.86 kg/m²      Physical Exam    "

## 2025-05-07 ENCOUNTER — OFFICE VISIT (OUTPATIENT)
Dept: INTERNAL MEDICINE CLINIC | Facility: CLINIC | Age: OVER 89
End: 2025-05-07
Payer: MEDICARE

## 2025-05-07 VITALS
BODY MASS INDEX: 29.04 KG/M2 | SYSTOLIC BLOOD PRESSURE: 142 MMHG | TEMPERATURE: 96.8 F | WEIGHT: 157.8 LBS | HEART RATE: 72 BPM | HEIGHT: 62 IN | DIASTOLIC BLOOD PRESSURE: 80 MMHG | OXYGEN SATURATION: 95 %

## 2025-05-07 DIAGNOSIS — R06.02 SOB (SHORTNESS OF BREATH): ICD-10-CM

## 2025-05-07 DIAGNOSIS — R63.4 UNINTENTIONAL WEIGHT LOSS: ICD-10-CM

## 2025-05-07 DIAGNOSIS — R73.03 PREDIABETES: ICD-10-CM

## 2025-05-07 DIAGNOSIS — E78.2 MIXED HYPERLIPIDEMIA: ICD-10-CM

## 2025-05-07 DIAGNOSIS — K21.9 GERD WITHOUT ESOPHAGITIS: ICD-10-CM

## 2025-05-07 DIAGNOSIS — E78.5 HYPERLIPIDEMIA, UNSPECIFIED HYPERLIPIDEMIA TYPE: ICD-10-CM

## 2025-05-07 DIAGNOSIS — R25.2 LEG CRAMP: ICD-10-CM

## 2025-05-07 DIAGNOSIS — M81.0 AGE-RELATED OSTEOPOROSIS WITHOUT CURRENT PATHOLOGICAL FRACTURE: Primary | ICD-10-CM

## 2025-05-07 DIAGNOSIS — M79.10 MUSCLE PAIN: ICD-10-CM

## 2025-05-07 PROCEDURE — G2211 COMPLEX E/M VISIT ADD ON: HCPCS | Performed by: GENERAL ACUTE CARE HOSPITAL

## 2025-05-07 PROCEDURE — 99214 OFFICE O/P EST MOD 30 MIN: CPT | Performed by: GENERAL ACUTE CARE HOSPITAL

## 2025-05-07 PROCEDURE — 96372 THER/PROPH/DIAG INJ SC/IM: CPT

## 2025-05-07 RX ORDER — OMEPRAZOLE 20 MG/1
20 CAPSULE, DELAYED RELEASE ORAL DAILY
Qty: 90 CAPSULE | Refills: 3 | Status: SHIPPED | OUTPATIENT
Start: 2025-05-07

## 2025-05-07 RX ORDER — MAGNESIUM OXIDE 400 MG/1
400 TABLET ORAL DAILY
Qty: 90 TABLET | Refills: 3 | Status: SHIPPED | OUTPATIENT
Start: 2025-05-07

## 2025-05-07 RX ORDER — ROSUVASTATIN CALCIUM 10 MG/1
10 TABLET, COATED ORAL DAILY
Qty: 90 TABLET | Refills: 3 | Status: SHIPPED | OUTPATIENT
Start: 2025-05-07

## 2025-05-07 NOTE — ASSESSMENT & PLAN NOTE
Recent A1c within diabetic range.  She reports eating many sweets recently.  She is interested in getting a nutritional consultation.  Referral entered.  Will repeat labs.  Orders:    Ambulatory Referral to Nutrition Services; Future

## 2025-05-07 NOTE — ASSESSMENT & PLAN NOTE
More muscle pain and stiffness since last visit. Upper back thigh and calf, shoulder girdle.   Denies weakness   We will obtain inflammation markers and muscle markers to rule out PMR myositis.  She is on rosuvastatin.  Statin related myopathy is a possibility.  I recommend to get labs first.      Orders:    CK; Future    Aldolase; Future    C-reactive protein; Future    Sedimentation rate, automated; Future

## 2025-05-08 NOTE — ASSESSMENT & PLAN NOTE
"She continues to have episodic shortness of breath lightheadedness tremor and chest discomfort.  About 4-5 episodes since last visit.  This was reported in last visit in November 2024.  In last visit it was not exertional.  She was able to exercise on her treadmill for 20 minutes without having any symptoms.  She has not been able to exercise over the past 3 months due to sciatica.  However she notices symptoms have recently been more exertional.  Lying down also worsens shortness of breath.  Weight stable no leg edema.chest xray showed \"Very prominent central pulmonary arteries which may indicate a degree of pulmonary hypertension. \".  I will obtain an echo and refer to cardiology for further evaluation.  Chest CT could be considered to evaluate her hiatal hernia.    Orders:    Echo complete w/ contrast if indicated; Future    Ambulatory Referral to Cardiology; Future    B-Type Natriuretic Peptide(BNP); Future    "

## 2025-05-08 NOTE — ASSESSMENT & PLAN NOTE
Magnesium helps with symptom relief.  Continue.  Orders:    magnesium oxide (MAG-OX) 400 mg tablet; Take 1 tablet (400 mg total) by mouth daily

## 2025-08-07 ENCOUNTER — CONSULT (OUTPATIENT)
Dept: CARDIOLOGY CLINIC | Facility: CLINIC | Age: OVER 89
End: 2025-08-07
Attending: GENERAL ACUTE CARE HOSPITAL
Payer: MEDICARE

## 2025-08-07 ENCOUNTER — HOSPITAL ENCOUNTER (OUTPATIENT)
Dept: NON INVASIVE DIAGNOSTICS | Facility: CLINIC | Age: OVER 89
Discharge: HOME | End: 2025-08-07
Attending: GENERAL ACUTE CARE HOSPITAL
Payer: MEDICARE

## 2025-08-07 VITALS
WEIGHT: 161.5 LBS | DIASTOLIC BLOOD PRESSURE: 78 MMHG | SYSTOLIC BLOOD PRESSURE: 148 MMHG | OXYGEN SATURATION: 95 % | HEART RATE: 63 BPM | BODY MASS INDEX: 29.72 KG/M2 | HEIGHT: 62 IN

## 2025-08-07 VITALS
HEIGHT: 62 IN | HEART RATE: 72 BPM | BODY MASS INDEX: 28.89 KG/M2 | WEIGHT: 157 LBS | SYSTOLIC BLOOD PRESSURE: 142 MMHG | DIASTOLIC BLOOD PRESSURE: 80 MMHG

## 2025-08-07 DIAGNOSIS — R06.02 SOB (SHORTNESS OF BREATH): ICD-10-CM

## 2025-08-07 DIAGNOSIS — R73.03 PREDIABETES: ICD-10-CM

## 2025-08-07 DIAGNOSIS — E78.00 PURE HYPERCHOLESTEROLEMIA: ICD-10-CM

## 2025-08-07 DIAGNOSIS — I35.0 NONRHEUMATIC AORTIC VALVE STENOSIS: ICD-10-CM

## 2025-08-07 DIAGNOSIS — R06.02 SOB (SHORTNESS OF BREATH): Primary | ICD-10-CM

## 2025-08-07 LAB
AORTIC ROOT: 3 CM
AORTIC VALVE MEAN VELOCITY: 17.7 M/S
ASCENDING AORTA: 3.3 CM
ATRIAL RATE: 63 BPM
AV AREA BY CONTINUOUS VTI: 1.6 CM2
AV AREA PEAK VELOCITY: 1.5 CM2
AV LVOT MEAN GRADIENT: 8 MMHG
AV LVOT PEAK GRADIENT: 14 MMHG
AV MEAN PRESS GRAD SYS DOP V1V2: 14 MMHG
AV ORIFICE AREA US: 1.58 CM2
AV PEAK GRADIENT: 25 MMHG
AV VELOCITY RATIO: 0.78
AV VMAX SYS DOP: 2.5 M/S
BSA FOR ECHO PROCEDURE: 1.72 M2
DOP CALC AO VTI: 62.73 CM
DOP CALC LVOT AREA: 2.01 CM2
DOP CALC LVOT CARDIAC INDEX: 3.28 L/MIN/M2
DOP CALC LVOT CARDIAC OUTPUT: 5.67 L/MIN
DOP CALC LVOT DIAMETER: 1.6 CM
DOP CALC LVOT PEAK VEL VTI: 49.23 CM
DOP CALC LVOT PEAK VEL: 1.86 M/S
DOP CALC LVOT STROKE INDEX: 54.9 ML/M2
DOP CALC LVOT STROKE VOLUME: 98.93
E WAVE DECELERATION TIME: 255 MS
E/A RATIO: 1.24
FRACTIONAL SHORTENING: 34 (ref 28–44)
INTERVENTRICULAR SEPTUM IN DIASTOLE (PARASTERNAL SHORT AXIS VIEW): 1.2 CM
INTERVENTRICULAR SEPTUM: 1.2 CM (ref 0.6–1.1)
LAAS-AP2: 31.6 CM2
LAAS-AP4: 33.6 CM2
LEFT ATRIUM SIZE: 3.9 CM
LEFT ATRIUM VOLUME (MOD BIPLANE): 127 ML
LEFT ATRIUM VOLUME INDEX (MOD BIPLANE): 73.4 ML/M2
LEFT INTERNAL DIMENSION IN SYSTOLE: 2.5 CM (ref 2.1–4)
LEFT VENTRICULAR INTERNAL DIMENSION IN DIASTOLE: 3.8 CM (ref 3.5–6)
LEFT VENTRICULAR POSTERIOR WALL IN END DIASTOLE: 1 CM
LEFT VENTRICULAR STROKE VOLUME: 41 ML
LV EF US.2D.A4C+ESTIMATED: 58 %
LVSV (TEICH): 41 ML
MAIN PULMONARY ARTERY: 4.5 CM
MV E'TISSUE VEL-LAT: 12 CM/S
MV E'TISSUE VEL-SEP: 7 CM/S
MV PEAK A VEL: 0.96 M/S
MV PEAK E VEL: 119 CM/S
MV STENOSIS PRESSURE HALF TIME: 74 MS
MV VALVE AREA P 1/2 METHOD: 2.97
P AXIS: 61 DEGREES
PA SYSTOLIC PRESSURE: 61 MMHG
PR INTERVAL: 146 MS
QRS AXIS: 54 DEGREES
QRSD INTERVAL: 80 MS
QT INTERVAL: 420 MS
QTC INTERVAL: 429 MS
RA PRESSURE ESTIMATED: 5 MMHG
RIGHT ATRIUM AREA SYSTOLE A4C: 19.2 CM2
RIGHT VENTRICLE ID DIMENSION: 4.4 CM
RV PSP: 61 MMHG
SL CV LEFT ATRIUM LENGTH A2C: 6.8 CM
SL CV LV EF: 60
SL CV PED ECHO LEFT VENTRICLE DIASTOLIC VOLUME (MOD BIPLANE) 2D: 62 ML
SL CV PED ECHO LEFT VENTRICLE SYSTOLIC VOLUME (MOD BIPLANE) 2D: 22 ML
T WAVE AXIS: 39 DEGREES
TR MAX PG: 56 MMHG
TR PEAK VELOCITY: 3.7 M/S
TRICUSPID ANNULAR PLANE SYSTOLIC EXCURSION: 2 CM
TRICUSPID VALVE PEAK REGURGITATION VELOCITY: 3.73 M/S
VENTRICULAR RATE: 63 BPM

## 2025-08-07 PROCEDURE — 93306 TTE W/DOPPLER COMPLETE: CPT | Performed by: INTERNAL MEDICINE

## 2025-08-07 PROCEDURE — 93000 ELECTROCARDIOGRAM COMPLETE: CPT | Performed by: INTERNAL MEDICINE

## 2025-08-07 PROCEDURE — 99204 OFFICE O/P NEW MOD 45 MIN: CPT | Performed by: INTERNAL MEDICINE

## 2025-08-07 PROCEDURE — 93306 TTE W/DOPPLER COMPLETE: CPT

## 2025-08-11 ENCOUNTER — TELEPHONE (OUTPATIENT)
Age: OVER 89
End: 2025-08-11

## 2025-08-12 PROBLEM — I27.20 PULMONARY HYPERTENSION (HCC): Status: ACTIVE | Noted: 2025-08-12

## (undated) RX ORDER — TRAVOPROST 0.04 MG/ML
1 SOLUTION/ DROPS OPHTHALMIC EVERY EVENING
Start: 2022-07-11

## (undated) RX ORDER — BRIMONIDINE TARTRATE 1 MG/ML: 1 SOLUTION/ DROPS OPHTHALMIC TWICE A DAY